# Patient Record
Sex: MALE | Race: WHITE | Employment: OTHER | URBAN - METROPOLITAN AREA
[De-identification: names, ages, dates, MRNs, and addresses within clinical notes are randomized per-mention and may not be internally consistent; named-entity substitution may affect disease eponyms.]

---

## 2019-03-21 VITALS
OXYGEN SATURATION: 97 % | RESPIRATION RATE: 18 BRPM | TEMPERATURE: 98.5 F | HEIGHT: 66 IN | SYSTOLIC BLOOD PRESSURE: 169 MMHG | DIASTOLIC BLOOD PRESSURE: 66 MMHG | WEIGHT: 199.52 LBS | HEART RATE: 54 BPM | BODY MASS INDEX: 32.06 KG/M2

## 2019-03-21 PROCEDURE — 93005 ELECTROCARDIOGRAM TRACING: CPT

## 2019-03-21 PROCEDURE — 99283 EMERGENCY DEPT VISIT LOW MDM: CPT

## 2019-03-22 ENCOUNTER — HOSPITAL ENCOUNTER (EMERGENCY)
Age: 79
Discharge: HOME | End: 2019-03-22
Attending: EMERGENCY MEDICINE
Payer: MEDICARE

## 2019-03-22 ENCOUNTER — APPOINTMENT (OUTPATIENT)
Dept: GENERAL RADIOLOGY | Age: 79
End: 2019-03-22
Attending: EMERGENCY MEDICINE
Payer: MEDICARE

## 2019-03-22 VITALS
WEIGHT: 195 LBS | TEMPERATURE: 98.4 F | HEIGHT: 66 IN | DIASTOLIC BLOOD PRESSURE: 77 MMHG | OXYGEN SATURATION: 93 % | RESPIRATION RATE: 16 BRPM | BODY MASS INDEX: 31.34 KG/M2 | HEART RATE: 85 BPM | SYSTOLIC BLOOD PRESSURE: 142 MMHG

## 2019-03-22 DIAGNOSIS — J20.9 ACUTE BRONCHITIS, UNSPECIFIED ORGANISM: Primary | ICD-10-CM

## 2019-03-22 DIAGNOSIS — J18.9 COMMUNITY ACQUIRED PNEUMONIA, UNSPECIFIED LATERALITY: Primary | ICD-10-CM

## 2019-03-22 LAB
ALBUMIN SERPL-MCNC: 3.6 G/DL (ref 3.5–5)
ALBUMIN SERPL-MCNC: 3.7 G/DL (ref 3.5–5)
ALBUMIN/GLOB SERPL: 0.9 {RATIO} (ref 1.1–2.2)
ALBUMIN/GLOB SERPL: 0.9 {RATIO} (ref 1.1–2.2)
ALP SERPL-CCNC: 128 U/L (ref 45–117)
ALP SERPL-CCNC: 129 U/L (ref 45–117)
ALT SERPL-CCNC: 20 U/L (ref 12–78)
ALT SERPL-CCNC: 21 U/L (ref 12–78)
ANION GAP SERPL CALC-SCNC: 5 MMOL/L (ref 5–15)
ANION GAP SERPL CALC-SCNC: 7 MMOL/L (ref 5–15)
AST SERPL-CCNC: 13 U/L (ref 15–37)
AST SERPL-CCNC: 15 U/L (ref 15–37)
ATRIAL RATE: 63 BPM
BASOPHILS # BLD: 0.1 K/UL (ref 0–0.1)
BASOPHILS # BLD: 0.1 K/UL (ref 0–0.1)
BASOPHILS NFR BLD: 1 % (ref 0–1)
BASOPHILS NFR BLD: 1 % (ref 0–1)
BILIRUB SERPL-MCNC: 0.6 MG/DL (ref 0.2–1)
BILIRUB SERPL-MCNC: 0.6 MG/DL (ref 0.2–1)
BNP SERPL-MCNC: 88 PG/ML
BUN SERPL-MCNC: 12 MG/DL (ref 6–20)
BUN SERPL-MCNC: 14 MG/DL (ref 6–20)
BUN/CREAT SERPL: 16 (ref 12–20)
BUN/CREAT SERPL: 19 (ref 12–20)
CALCIUM SERPL-MCNC: 8.5 MG/DL (ref 8.5–10.1)
CALCIUM SERPL-MCNC: 8.7 MG/DL (ref 8.5–10.1)
CALCULATED P AXIS, ECG09: 61 DEGREES
CALCULATED R AXIS, ECG10: 62 DEGREES
CALCULATED T AXIS, ECG11: 75 DEGREES
CHLORIDE SERPL-SCNC: 104 MMOL/L (ref 97–108)
CHLORIDE SERPL-SCNC: 106 MMOL/L (ref 97–108)
CK SERPL-CCNC: 57 U/L (ref 39–308)
CO2 SERPL-SCNC: 27 MMOL/L (ref 21–32)
CO2 SERPL-SCNC: 28 MMOL/L (ref 21–32)
COMMENT, HOLDF: NORMAL
CREAT SERPL-MCNC: 0.74 MG/DL (ref 0.7–1.3)
CREAT SERPL-MCNC: 0.74 MG/DL (ref 0.7–1.3)
DIAGNOSIS, 93000: NORMAL
DIFFERENTIAL METHOD BLD: ABNORMAL
DIFFERENTIAL METHOD BLD: ABNORMAL
EOSINOPHIL # BLD: 0.3 K/UL (ref 0–0.4)
EOSINOPHIL # BLD: 0.5 K/UL (ref 0–0.4)
EOSINOPHIL NFR BLD: 3 % (ref 0–7)
EOSINOPHIL NFR BLD: 4 % (ref 0–7)
ERYTHROCYTE [DISTWIDTH] IN BLOOD BY AUTOMATED COUNT: 13.4 % (ref 11.5–14.5)
ERYTHROCYTE [DISTWIDTH] IN BLOOD BY AUTOMATED COUNT: 13.5 % (ref 11.5–14.5)
GLOBULIN SER CALC-MCNC: 3.9 G/DL (ref 2–4)
GLOBULIN SER CALC-MCNC: 4 G/DL (ref 2–4)
GLUCOSE SERPL-MCNC: 103 MG/DL (ref 65–100)
GLUCOSE SERPL-MCNC: 99 MG/DL (ref 65–100)
HCT VFR BLD AUTO: 43.3 % (ref 36.6–50.3)
HCT VFR BLD AUTO: 43.8 % (ref 36.6–50.3)
HGB BLD-MCNC: 14 G/DL (ref 12.1–17)
HGB BLD-MCNC: 14.5 G/DL (ref 12.1–17)
IMM GRANULOCYTES # BLD AUTO: 0 K/UL (ref 0–0.04)
IMM GRANULOCYTES # BLD AUTO: 0 K/UL (ref 0–0.04)
IMM GRANULOCYTES NFR BLD AUTO: 0 % (ref 0–0.5)
IMM GRANULOCYTES NFR BLD AUTO: 0 % (ref 0–0.5)
LYMPHOCYTES # BLD: 1 K/UL (ref 0.8–3.5)
LYMPHOCYTES # BLD: 1.4 K/UL (ref 0.8–3.5)
LYMPHOCYTES NFR BLD: 10 % (ref 12–49)
LYMPHOCYTES NFR BLD: 12 % (ref 12–49)
MCH RBC QN AUTO: 28.5 PG (ref 26–34)
MCH RBC QN AUTO: 29.1 PG (ref 26–34)
MCHC RBC AUTO-ENTMCNC: 32 G/DL (ref 30–36.5)
MCHC RBC AUTO-ENTMCNC: 33.5 G/DL (ref 30–36.5)
MCV RBC AUTO: 86.9 FL (ref 80–99)
MCV RBC AUTO: 89.2 FL (ref 80–99)
MONOCYTES # BLD: 0.6 K/UL (ref 0–1)
MONOCYTES # BLD: 0.7 K/UL (ref 0–1)
MONOCYTES NFR BLD: 6 % (ref 5–13)
MONOCYTES NFR BLD: 7 % (ref 5–13)
NEUTS SEG # BLD: 7.9 K/UL (ref 1.8–8)
NEUTS SEG # BLD: 8.9 K/UL (ref 1.8–8)
NEUTS SEG NFR BLD: 77 % (ref 32–75)
NEUTS SEG NFR BLD: 79 % (ref 32–75)
NRBC # BLD: 0 K/UL (ref 0–0.01)
NRBC # BLD: 0 K/UL (ref 0–0.01)
NRBC BLD-RTO: 0 PER 100 WBC
NRBC BLD-RTO: 0 PER 100 WBC
P-R INTERVAL, ECG05: 164 MS
PLATELET # BLD AUTO: 177 K/UL (ref 150–400)
PLATELET # BLD AUTO: 188 K/UL (ref 150–400)
PMV BLD AUTO: 10.2 FL (ref 8.9–12.9)
PMV BLD AUTO: 9.9 FL (ref 8.9–12.9)
POTASSIUM SERPL-SCNC: 4.1 MMOL/L (ref 3.5–5.1)
POTASSIUM SERPL-SCNC: 4.3 MMOL/L (ref 3.5–5.1)
PROT SERPL-MCNC: 7.6 G/DL (ref 6.4–8.2)
PROT SERPL-MCNC: 7.6 G/DL (ref 6.4–8.2)
Q-T INTERVAL, ECG07: 386 MS
QRS DURATION, ECG06: 96 MS
QTC CALCULATION (BEZET), ECG08: 395 MS
RBC # BLD AUTO: 4.91 M/UL (ref 4.1–5.7)
RBC # BLD AUTO: 4.98 M/UL (ref 4.1–5.7)
SAMPLES BEING HELD,HOLD: NORMAL
SODIUM SERPL-SCNC: 138 MMOL/L (ref 136–145)
SODIUM SERPL-SCNC: 139 MMOL/L (ref 136–145)
TROPONIN I SERPL-MCNC: <0.05 NG/ML
VENTRICULAR RATE, ECG03: 63 BPM
WBC # BLD AUTO: 11.5 K/UL (ref 4.1–11.1)
WBC # BLD AUTO: 9.9 K/UL (ref 4.1–11.1)

## 2019-03-22 PROCEDURE — 80053 COMPREHEN METABOLIC PANEL: CPT

## 2019-03-22 PROCEDURE — 36415 COLL VENOUS BLD VENIPUNCTURE: CPT

## 2019-03-22 PROCEDURE — 82550 ASSAY OF CK (CPK): CPT

## 2019-03-22 PROCEDURE — 77030029684 HC NEB SM VOL KT MONA -A

## 2019-03-22 PROCEDURE — 74011000250 HC RX REV CODE- 250: Performed by: EMERGENCY MEDICINE

## 2019-03-22 PROCEDURE — 94640 AIRWAY INHALATION TREATMENT: CPT

## 2019-03-22 PROCEDURE — 71046 X-RAY EXAM CHEST 2 VIEWS: CPT

## 2019-03-22 PROCEDURE — 85025 COMPLETE CBC W/AUTO DIFF WBC: CPT

## 2019-03-22 PROCEDURE — 99284 EMERGENCY DEPT VISIT MOD MDM: CPT

## 2019-03-22 PROCEDURE — 84484 ASSAY OF TROPONIN QUANT: CPT

## 2019-03-22 PROCEDURE — 74011250637 HC RX REV CODE- 250/637: Performed by: EMERGENCY MEDICINE

## 2019-03-22 PROCEDURE — 96360 HYDRATION IV INFUSION INIT: CPT

## 2019-03-22 PROCEDURE — 83880 ASSAY OF NATRIURETIC PEPTIDE: CPT

## 2019-03-22 PROCEDURE — 74011250636 HC RX REV CODE- 250/636: Performed by: EMERGENCY MEDICINE

## 2019-03-22 RX ORDER — HYDROCODONE BITARTRATE AND HOMATROPINE METHYLBROMIDE ORAL SOLUTION 5; 1.5 MG/5ML; MG/5ML
5 LIQUID ORAL
Qty: 120 ML | Refills: 0 | Status: SHIPPED | OUTPATIENT
Start: 2019-03-22 | End: 2019-03-25

## 2019-03-22 RX ORDER — DOXYCYCLINE HYCLATE 100 MG
100 TABLET ORAL
Status: COMPLETED | OUTPATIENT
Start: 2019-03-22 | End: 2019-03-22

## 2019-03-22 RX ORDER — DOXYCYCLINE HYCLATE 100 MG
100 TABLET ORAL 2 TIMES DAILY
Qty: 14 TAB | Refills: 0 | Status: SHIPPED | OUTPATIENT
Start: 2019-03-22 | End: 2019-03-22

## 2019-03-22 RX ORDER — ACETAMINOPHEN 325 MG/1
650 TABLET ORAL
Status: COMPLETED | OUTPATIENT
Start: 2019-03-22 | End: 2019-03-22

## 2019-03-22 RX ORDER — DOXYCYCLINE HYCLATE 100 MG
100 TABLET ORAL 2 TIMES DAILY
Qty: 14 TAB | Refills: 0 | Status: SHIPPED | OUTPATIENT
Start: 2019-03-22 | End: 2019-03-29

## 2019-03-22 RX ORDER — ALBUTEROL SULFATE 2.5 MG/.5ML
5 SOLUTION RESPIRATORY (INHALATION)
Status: COMPLETED | OUTPATIENT
Start: 2019-03-22 | End: 2019-03-22

## 2019-03-22 RX ORDER — PREDNISONE 20 MG/1
40 TABLET ORAL DAILY
Qty: 10 TAB | Refills: 0 | Status: SHIPPED | OUTPATIENT
Start: 2019-03-22 | End: 2019-03-22

## 2019-03-22 RX ORDER — ALBUTEROL SULFATE 90 UG/1
1 AEROSOL, METERED RESPIRATORY (INHALATION)
Status: COMPLETED | OUTPATIENT
Start: 2019-03-22 | End: 2019-03-22

## 2019-03-22 RX ORDER — IPRATROPIUM BROMIDE AND ALBUTEROL SULFATE 2.5; .5 MG/3ML; MG/3ML
3 SOLUTION RESPIRATORY (INHALATION)
Status: COMPLETED | OUTPATIENT
Start: 2019-03-22 | End: 2019-03-22

## 2019-03-22 RX ORDER — PREDNISONE 20 MG/1
40 TABLET ORAL DAILY
Qty: 10 TAB | Refills: 0 | Status: SHIPPED | OUTPATIENT
Start: 2019-03-22 | End: 2019-03-27

## 2019-03-22 RX ORDER — HYDROCODONE POLISTIREX AND CHLORPHENIRAMINE POLISTIREX 10; 8 MG/5ML; MG/5ML
5 SUSPENSION, EXTENDED RELEASE ORAL
Status: COMPLETED | OUTPATIENT
Start: 2019-03-22 | End: 2019-03-22

## 2019-03-22 RX ADMIN — ACETAMINOPHEN 650 MG: 325 TABLET ORAL at 17:29

## 2019-03-22 RX ADMIN — ALBUTEROL SULFATE 5 MG: 2.5 SOLUTION RESPIRATORY (INHALATION) at 04:43

## 2019-03-22 RX ADMIN — ALBUTEROL SULFATE 5 MG: 2.5 SOLUTION RESPIRATORY (INHALATION) at 03:08

## 2019-03-22 RX ADMIN — SODIUM CHLORIDE 500 ML: 900 INJECTION, SOLUTION INTRAVENOUS at 03:08

## 2019-03-22 RX ADMIN — HYDROCODONE POLISTIREX AND CHLORPHENIRAMINE POLISTIREX 5 ML: 10; 8 SUSPENSION, EXTENDED RELEASE ORAL at 04:43

## 2019-03-22 RX ADMIN — ALBUTEROL SULFATE 1 PUFF: 90 AEROSOL, METERED RESPIRATORY (INHALATION) at 04:43

## 2019-03-22 RX ADMIN — DOXYCYCLINE HYCLATE 100 MG: 100 TABLET, COATED ORAL at 04:43

## 2019-03-22 RX ADMIN — IPRATROPIUM BROMIDE AND ALBUTEROL SULFATE 3 ML: .5; 3 SOLUTION RESPIRATORY (INHALATION) at 17:31

## 2019-03-22 NOTE — ED PROVIDER NOTES
EMERGENCY DEPARTMENT HISTORY AND PHYSICAL EXAM 
 
 
Date: 3/22/2019 Patient Name: Alexandra Dickey History of Presenting Illness Chief Complaint Patient presents with  Shortness of Breath Worse with laying down. Cough x a few days. Pt reports he has had a cold over the past few days. History Provided By: Patient HPI: Alexandra Dickey, 69 y.o. male with PMHx significant for hypertension, presents POV to the ED with cc of shortness of breath. Patient states he had been on a [place] cruise a couple months ago and had developed an upper respiratory illness at the end of his travel. Patient states he used over-the-counter medications for cough and cold and flu symptoms and had improvement of all of his symptoms. Over the last 2-3 days patient notes increased in shortness of breath especially with exertion and a nonproductive cough, and unable to sleep the last night due to feeling short of breath and cough. Patient denies any fever, there has been nasal congestion, he denies any sore throat. Patient denies any chest pain or chest discomfort. Patient states he feels short of breath just feels like he cannot get a deep breath in and when laying flat he feels that he cannot read either. Patient has no prior history of asthma or COPD. Patient denies any abdominal pain. Patient denies any nausea vomiting diarrhea. Patient denies any leg swelling or calf tenderness. Patient denies any history of previous blood clots. Almost age and almost wherever There are no other complaints, changes, or physical findings at this time. PCP: None No current facility-administered medications on file prior to encounter. Current Outpatient Medications on File Prior to Encounter Medication Sig Dispense Refill  multivitamin (ONE A DAY) tablet Take 1 Tab by mouth daily.  omega-3 fatty acids-vitamin e (FISH OIL) 1,000 mg Cap Take 1 Cap by mouth daily.  naproxen sodium (ALEVE) 220 mg tablet Take 220 mg by mouth daily.  aspirin 81 mg tablet Take 81 mg by mouth daily.  HYDROcodone-acetaminophen (NORCO) 5-325 mg per tablet Take 1-2 Tabs by mouth every four (4) hours as needed for Pain for 20 doses. 20 Tab 0  
 ondansetron (ZOFRAN ODT) 4 mg disintegrating tablet Take 1 Tab by mouth every eight (8) hours as needed for Nausea. 10 Tab 0  
 diphenoxylate-atropine (LOMOTIL) 2.5-0.025 mg per tablet Take 1 Tab by mouth four (4) times daily as needed for Diarrhea. 20 Tab 0 Past History Past Medical History: 
Past Medical History:  
Diagnosis Date  Hypertension Past Surgical History: 
Past Surgical History:  
Procedure Laterality Date  CARDIAC SURG PROCEDURE UNLIST    
 cardiac stents Family History: No family history on file. Social History: 
Social History Tobacco Use  Smoking status: Never Smoker Substance Use Topics  Alcohol use: No  
 Drug use: Not on file Allergies: 
No Known Allergies Review of Systems Review of Systems Constitutional: Positive for appetite change and fatigue. Negative for chills and fever. HENT: Positive for congestion and postnasal drip. Negative for rhinorrhea, sinus pressure, sore throat and trouble swallowing. Eyes: Negative. Respiratory: Positive for cough, shortness of breath and wheezing. Negative for choking and chest tightness. Cardiovascular: Negative. Negative for chest pain, palpitations and leg swelling. Gastrointestinal: Negative for abdominal pain, constipation, diarrhea, nausea and vomiting. Endocrine: Negative. Genitourinary: Negative. Negative for difficulty urinating, dysuria, flank pain and urgency. Musculoskeletal: Negative. Negative for arthralgias, myalgias, neck pain and neck stiffness. Skin: Negative. Neurological: Negative. Negative for dizziness, speech difficulty, weakness, light-headedness, numbness and headaches. Psychiatric/Behavioral: Negative. All other systems reviewed and are negative. Physical Exam  
Physical Exam  
Constitutional: He is oriented to person, place, and time. He appears well-developed and well-nourished. No distress. HENT:  
Head: Normocephalic and atraumatic. Mouth/Throat: Oropharynx is clear and moist. No oropharyngeal exudate. Eyes: Pupils are equal, round, and reactive to light. Conjunctivae and EOM are normal.  
Neck: Normal range of motion. Neck supple. No JVD present. No tracheal deviation present. Cardiovascular: Normal rate, regular rhythm, normal heart sounds and intact distal pulses. No murmur heard. Pulmonary/Chest: No stridor. No respiratory distress. He has no rales. He exhibits no tenderness. Increase work of breathing, diminished throughout all lung fields Abdominal: Soft. He exhibits no distension. There is no tenderness. There is no rebound and no guarding. Obese Musculoskeletal: Normal range of motion. He exhibits no edema or tenderness. Neurological: He is alert and oriented to person, place, and time. No cranial nerve deficit. No gross motor or sensory deficits Skin: Skin is warm and dry. He is not diaphoretic. Psychiatric: He has a normal mood and affect. His behavior is normal.  
Nursing note and vitals reviewed. Diagnostic Study Results Labs - Recent Results (from the past 12 hour(s)) SAMPLES BEING HELD Collection Time: 03/22/19  4:21 PM  
Result Value Ref Range SAMPLES BEING HELD 1RED 1BLU 2BC(1LAV 1NVY) COMMENT Add-on orders for these samples will be processed based on acceptable specimen integrity and analyte stability, which may vary by analyte. CBC WITH AUTOMATED DIFF Collection Time: 03/22/19  4:21 PM  
Result Value Ref Range WBC 9.9 4.1 - 11.1 K/uL  
 RBC 4.91 4.10 - 5.70 M/uL  
 HGB 14.0 12.1 - 17.0 g/dL HCT 43.8 36.6 - 50.3 %  MCV 89.2 80.0 - 99.0 FL  
 MCH 28.5 26.0 - 34.0 PG  
 MCHC 32.0 30.0 - 36.5 g/dL  
 RDW 13.5 11.5 - 14.5 % PLATELET 377 258 - 087 K/uL MPV 9.9 8.9 - 12.9 FL  
 NRBC 0.0 0  WBC ABSOLUTE NRBC 0.00 0.00 - 0.01 K/uL NEUTROPHILS 79 (H) 32 - 75 % LYMPHOCYTES 10 (L) 12 - 49 % MONOCYTES 7 5 - 13 % EOSINOPHILS 3 0 - 7 % BASOPHILS 1 0 - 1 % IMMATURE GRANULOCYTES 0 0.0 - 0.5 % ABS. NEUTROPHILS 7.9 1.8 - 8.0 K/UL  
 ABS. LYMPHOCYTES 1.0 0.8 - 3.5 K/UL  
 ABS. MONOCYTES 0.7 0.0 - 1.0 K/UL  
 ABS. EOSINOPHILS 0.3 0.0 - 0.4 K/UL  
 ABS. BASOPHILS 0.1 0.0 - 0.1 K/UL  
 ABS. IMM. GRANS. 0.0 0.00 - 0.04 K/UL  
 DF AUTOMATED METABOLIC PANEL, COMPREHENSIVE Collection Time: 03/22/19  4:21 PM  
Result Value Ref Range Sodium 138 136 - 145 mmol/L Potassium 4.1 3.5 - 5.1 mmol/L Chloride 106 97 - 108 mmol/L  
 CO2 27 21 - 32 mmol/L Anion gap 5 5 - 15 mmol/L Glucose 99 65 - 100 mg/dL BUN 14 6 - 20 MG/DL Creatinine 0.74 0.70 - 1.30 MG/DL  
 BUN/Creatinine ratio 19 12 - 20 GFR est AA >60 >60 ml/min/1.73m2 GFR est non-AA >60 >60 ml/min/1.73m2 Calcium 8.5 8.5 - 10.1 MG/DL Bilirubin, total 0.6 0.2 - 1.0 MG/DL  
 ALT (SGPT) 20 12 - 78 U/L  
 AST (SGOT) 15 15 - 37 U/L Alk. phosphatase 128 (H) 45 - 117 U/L Protein, total 7.6 6.4 - 8.2 g/dL Albumin 3.6 3.5 - 5.0 g/dL Globulin 4.0 2.0 - 4.0 g/dL A-G Ratio 0.9 (L) 1.1 - 2.2 Radiologic Studies -  
XR CHEST PA LAT Final Result IMPRESSION: Right lower lobe linear atelectasis. Otherwise no acute  
cardiopulmonary process. CT Results  (Last 48 hours) None CXR Results  (Last 48 hours) 03/22/19 0304  XR CHEST PA LAT Final result Impression:  IMPRESSION: Right lower lobe linear atelectasis. Otherwise no acute  
cardiopulmonary process. Narrative:  EXAM:  XR CHEST PA LAT INDICATION:   SOA  
   
COMPARISON: Chest radiograph 7/19/2012. FINDINGS: PA and lateral radiographs of the chest were obtained. There is linear atelectasis in the right lower lobe. Unchanged elevation of the  
right hemidiaphragm. No pleural effusion or pneumothorax. Heart, wendy,  
mediastinum are within normal limits. No acute osseous abnormalities. Medical Decision Making I am the first provider for this patient. I reviewed the vital signs, available nursing notes, past medical history, past surgical history, family history and social history. Vital Signs-Reviewed the patient's vital signs. No data found. Pulse Oximetry Analysis - 94% on RA Cardiac Monitor:  
Rate: 90 bpm 
Rhythm: Normal Sinus Rhythm EKG interpretation: (Preliminary) Sinus rhythm with PACs, rate 63, normal axis, normal MD, normal QRS, no acute ST changes. Records Reviewed: Nursing Notes and Old Medical Records Provider Notes (Medical Decision Making): DDx- Bronchitis, pneumonia, acute heart failure, ACS 
 
ED Course:  
Initial assessment performed. The patients presenting problems have been discussed, and they are in agreement with the care plan formulated and outlined with them. I have encouraged them to ask questions as they arise throughout their visit. During the patient's ED stay he was given 3 separate breathing treatments of albuterol patient noticeably felt that he can breathe better at the time of discharge was not short of breath with exertion or laying flat. Patient cardiac enzymes were normal.  ProBNP within normal limits. Patient be treated with inhalers at home in addition to prednisone and patient has not had a previous use of an inhaler in the past antibiotics. Prescription for a spacer was sent to his pharmacy to be used with his metered-dose inhaler. Discussed with patient as well as his recent trip this was approximately 2 months ago and had no other significant exposures including potential aspiration of motion water. Critical Care Time:  
None Disposition: 
Discharge home PLAN: 
1. Discharge Medication List as of 3/22/2019  5:07 AM  
  
START taking these medications Details  
predniSONE (DELTASONE) 20 mg tablet Take 40 mg by mouth daily for 5 days. With Breakfast, Normal, Disp-10 Tab, R-0  
  
HYDROcodone-homatropine (HYCODAN) 5-1.5 mg/5 mL syrup Take 5 mL by mouth four (4) times daily as needed (120) for up to 3 days. Max Daily Amount: 20 mL., Print, Disp-120 mL, R-0  
  
doxycycline (VIBRA-TABS) 100 mg tablet Take 1 Tab by mouth two (2) times a day for 7 days. , Normal, Disp-14 Tab, R-0  
  
inhalational spacing device 1 Each by Does Not Apply route as needed for Other (SOA). , Normal, Disp-1 Each, R-0  
  
  
CONTINUE these medications which have NOT CHANGED Details  
multivitamin (ONE A DAY) tablet Take 1 Tab by mouth daily. Historical Med, 1 Tab  
  
omega-3 fatty acids-vitamin e (FISH OIL) 1,000 mg Cap Take 1 Cap by mouth daily. Historical Med, 1 Cap  
  
naproxen sodium (ALEVE) 220 mg tablet Take 220 mg by mouth daily. Historical Med, 220 mg  
  
aspirin 81 mg tablet Take 81 mg by mouth daily. Historical Med, 81 mg HYDROcodone-acetaminophen (NORCO) 5-325 mg per tablet Take 1-2 Tabs by mouth every four (4) hours as needed for Pain for 20 doses. Print, 1-2 Tab, Disp-20 Tab, R-0  
  
ondansetron (ZOFRAN ODT) 4 mg disintegrating tablet Take 1 Tab by mouth every eight (8) hours as needed for Nausea. Print, 4 mg, Disp-10 Tab, R-0  
  
diphenoxylate-atropine (LOMOTIL) 2.5-0.025 mg per tablet Take 1 Tab by mouth four (4) times daily as needed for Diarrhea. Print, 1 Tab, Disp-20 Tab, R-0  
  
  
 
2. Follow-up Information Follow up With Specialties Details Why Contact Info None    None ([phone] Patient stated that they have no PCP [facility] EM[facility] Emergency Medicine  If symptoms worsen [address] [place] [address] [address]  [facility] 
[phone] Return to ED if worse Diagnosis Clinical Impression: 1. Community acquired pneumonia, unspecified laterality Attestations: 
 
None

## 2019-03-22 NOTE — DISCHARGE INSTRUCTIONS
Patient Education        Pneumonia: Care Instructions  Your Care Instructions    Pneumonia is an infection of the lungs. Most cases are caused by infections from bacteria or viruses. Pneumonia may be mild or very severe. If it is caused by bacteria, you will be treated with antibiotics. It may take a few weeks to a few months to recover fully from pneumonia, depending on how sick you were and whether your overall health is good. Follow-up care is a key part of your treatment and safety. Be sure to make and go to all appointments, and call your doctor if you are having problems. It's also a good idea to know your test results and keep a list of the medicines you take. How can you care for yourself at home? · Take your antibiotics exactly as directed. Do not stop taking the medicine just because you are feeling better. You need to take the full course of antibiotics. · Take your medicines exactly as prescribed. Call your doctor if you think you are having a problem with your medicine. · Get plenty of rest and sleep. You may feel weak and tired for a while, but your energy level will improve with time. · To prevent dehydration, drink plenty of fluids, enough so that your urine is light yellow or clear like water. Choose water and other caffeine-free clear liquids until you feel better. If you have kidney, heart, or liver disease and have to limit fluids, talk with your doctor before you increase the amount of fluids you drink. · Take care of your cough so you can rest. A cough that brings up mucus from your lungs is common with pneumonia. It is one way your body gets rid of the infection. But if coughing keeps you from resting or causes severe fatigue and chest-wall pain, talk to your doctor. He or she may suggest that you take a medicine to reduce the cough. · Use a vaporizer or humidifier to add moisture to your bedroom. Follow the directions for cleaning the machine.   · Do not smoke or allow others to smoke around you. Smoke will make your cough last longer. If you need help quitting, talk to your doctor about stop-smoking programs and medicines. These can increase your chances of quitting for good. · Take an over-the-counter pain medicine, such as acetaminophen (Tylenol), ibuprofen (Advil, Motrin), or naproxen (Aleve). Read and follow all instructions on the label. · Do not take two or more pain medicines at the same time unless the doctor told you to. Many pain medicines have acetaminophen, which is Tylenol. Too much acetaminophen (Tylenol) can be harmful. · If you were given a spirometer to measure how well your lungs are working, use it as instructed. This can help your doctor tell how your recovery is going. · To prevent pneumonia in the future, talk to your doctor about getting a flu vaccine (once a year) and a pneumococcal vaccine (one time only for most people). When should you call for help? Call 911 anytime you think you may need emergency care. For example, call if:    · You have severe trouble breathing.    Call your doctor now or seek immediate medical care if:    · You cough up dark brown or bloody mucus (sputum).     · You have new or worse trouble breathing.     · You are dizzy or lightheaded, or you feel like you may faint.    Watch closely for changes in your health, and be sure to contact your doctor if:    · You have a new or higher fever.     · You are coughing more deeply or more often.     · You are not getting better after 2 days (48 hours).     · You do not get better as expected. Where can you learn more? Go to http://miguel-rodney.info/. Enter [id number] in the search box to learn more about \"Pneumonia: Care Instructions. \"  Current as of: September 5, 2018  Content Version: 11.9  © 9085-7125 [employer]. Care instructions adapted under license by [employer] (which disclaims liability or warranty for this information).  If you have questions about a medical condition or this instruction, always ask your healthcare professional. [address]  any warranty or liability for your use of this information.        ALBUTEROL EVERY 4 HOURS FOR 2 DAYS AND THEN EVERY AS NEEDED

## 2019-03-22 NOTE — ED NOTES
Patient ambulatory to ER room with Triage RN, steady gait observed. Patient accompanied by family. 5:24 PM 
RT paged for neb tx. 
 
5:32 PM 
RT at bedside.

## 2019-03-22 NOTE — ED PROVIDER NOTES
69 y.o. male with past medical history significant for HTN who presents from home with chief complaint of fever. [facility] d/t SOB, cough, and fever. He had a CXR w/ blood work, was given a breathing treatment, was Dx w/ PNA, and was given a first dose of abx w/ prednisone ~0500 this morning. Pt was discharged w/ prescriptions for an inhaler, prednisone, and doxycycline w/ recommendations to return if his fever increased. Pt states he has not been able to fill his prescription for abx since he was discharged. Pt's son reports Pt's fever increased to 102.5 F 1-2 hours ago. Pt also currently c/o upper abdominal pain which he attributes to frequent coughing. NKDA. Pt denies hx of HTN, DM, COPD, or asthma. Pt denies recent ill contacts. There are no other acute medical concerns at this time. Note written by Johny Bueno, as dictated by Drucie Dandy, MD 5:23 PM 
 
 
  
 
Past Medical History:  
Diagnosis Date  Hypertension Past Surgical History:  
Procedure Laterality Date  CARDIAC SURG PROCEDURE UNLIST    
 cardiac stents History reviewed. No pertinent family history. Social History Socioeconomic History  Marital status:  Spouse name: Not on file  Number of children: Not on file  Years of education: Not on file  Highest education level: Not on file Occupational History  Not on file Social Needs  Financial resource strain: Not on file  Food insecurity:  
  Worry: Not on file Inability: Not on file  Transportation needs:  
  Medical: Not on file Non-medical: Not on file Tobacco Use  Smoking status: Never Smoker Substance and Sexual Activity  Alcohol use: No  
 Drug use: Not on file  Sexual activity: Not on file Lifestyle  Physical activity:  
  Days per week: Not on file Minutes per session: Not on file  Stress: Not on file Relationships  Social connections:  
  Talks on phone: Not on file Gets together: Not on file Attends Zoroastrian service: Not on file Active member of club or organization: Not on file Attends meetings of clubs or organizations: Not on file Relationship status: Not on file  Intimate partner violence:  
  Fear of current or ex partner: Not on file Emotionally abused: Not on file Physically abused: Not on file Forced sexual activity: Not on file Other Topics Concern  Not on file Social History Narrative  Not on file ALLERGIES: Patient has no known allergies. Review of Systems Constitutional: Positive for fever. Negative for chills. Respiratory: Positive for cough and shortness of breath. Cardiovascular: Negative for chest pain. Gastrointestinal: Positive for abdominal pain. Negative for constipation, diarrhea and vomiting. Neurological: Negative for dizziness and light-headedness. All other systems reviewed and are negative. Vitals:  
 03/22/19 1426 03/22/19 1556 03/22/19 1721 03/22/19 1731 BP:  155/79 149/71 Pulse: 75 75 Resp:  16 18 Temp:  (!) 102.5 °F (39.2 °C) 100.3 °F (37.9 °C) SpO2: 95% 93% 91% 93% Weight:  88.5 kg (195 lb) Height:  5' 6\" (1.676 m) Physical Exam  
Constitutional: He appears well-developed and well-nourished. No distress. HENT:  
Head: Normocephalic and atraumatic. Eyes: Pupils are equal, round, and reactive to light. Conjunctivae are normal.  
Neck: Normal range of motion. Cardiovascular: Normal rate and regular rhythm. Pulmonary/Chest: Effort normal. He has wheezes. Diffuse expiratory wheezes. Abdominal: Soft. He exhibits no distension. There is no tenderness. Musculoskeletal: Normal range of motion. Neurological: He is alert. Skin: Skin is dry. Capillary refill takes less than 2 seconds. Nursing note and vitals reviewed. Note written by Johny Roldan, as dictated by Nedra Mcelroy MD 5:23 PM 
 
MDM Number of Diagnoses or Management Options Acute bronchitis, unspecified organism:  
Diagnosis management comments: DDX negative for PNA, PTX, pericarditis, and patient improved after nebulizers. Has Rx for albuterol, prednisone, doxycycline, and spacer - given paper Rx as his pharmacy was closed. Procedures PROGRESS NOTE: 
5:25 PM 
Nedra Mcelroy MD reviewed CXR from [facility] this morning which is clear. The patient's results have been reviewed with them and/or available family. Patient and/or family verbally conveyed their understanding and agreement of the patient's signs, symptoms, diagnosis, treatment and prognosis and additionally agree to follow up as recommended in the discharge instructions or to return to the Emergency Room should their condition change prior to their follow-up appointment. The patient/family verbally agrees with the care-plan and verbally conveys that all of their questions have been answered. The discharge instructions have also been provided to the patient and/or family with some educational information regarding the patient's diagnosis as well a list of reasons why the patient would want to return to the ER prior to their follow-up appointment, should their condition change.

## 2019-03-22 NOTE — ED NOTES
Quick look: [id number] and sent home. Arrives back with same complaints as yesterday.

## 2019-03-22 NOTE — DISCHARGE INSTRUCTIONS
Patient Education        Bronchitis: Care Instructions  Your Care Instructions    Bronchitis is inflammation of the bronchial tubes, which carry air to the lungs. The tubes swell and produce mucus, or phlegm. The mucus and inflamed bronchial tubes make you cough. You may have trouble breathing. Most cases of bronchitis are caused by viruses like those that cause colds. Antibiotics usually do not help and they may be harmful. Bronchitis usually develops rapidly and lasts about 2 to 3 weeks in otherwise healthy people. Follow-up care is a key part of your treatment and safety. Be sure to make and go to all appointments, and call your doctor if you are having problems. It's also a good idea to know your test results and keep a list of the medicines you take. How can you care for yourself at home? · Take all medicines exactly as prescribed. Call your doctor if you think you are having a problem with your medicine. · Get some extra rest.  · Take an over-the-counter pain medicine, such as acetaminophen (Tylenol), ibuprofen (Advil, Motrin), or naproxen (Aleve) to reduce fever and relieve body aches. Read and follow all instructions on the label. · Do not take two or more pain medicines at the same time unless the doctor told you to. Many pain medicines have acetaminophen, which is Tylenol. Too much acetaminophen (Tylenol) can be harmful. · Take an over-the-counter cough medicine that contains dextromethorphan to help quiet a dry, hacking cough so that you can sleep. Avoid cough medicines that have more than one active ingredient. Read and follow all instructions on the label. · Breathe moist air from a humidifier, hot shower, or sink filled with hot water. The heat and moisture will thin mucus so you can cough it out. · Do not smoke. Smoking can make bronchitis worse. If you need help quitting, talk to your doctor about stop-smoking programs and medicines.  These can increase your chances of quitting for good.  When should you call for help? Call 911 anytime you think you may need emergency care. For example, call if:    · You have severe trouble breathing.    Call your doctor now or seek immediate medical care if:    · You have new or worse trouble breathing.     · You cough up dark brown or bloody mucus (sputum).     · You have a new or higher fever.     · You have a new rash.    Watch closely for changes in your health, and be sure to contact your doctor if:    · You cough more deeply or more often, especially if you notice more mucus or a change in the color of your mucus.     · You are not getting better as expected. Where can you learn more? Go to http://miguel-rodney.info/. Enter H333 in the search box to learn more about \"Bronchitis: Care Instructions. \"  Current as of: September 5, 2018  Content Version: 11.9  © 6051-1319 [employer]. Care instructions adapted under license by [employer] (which disclaims liability or warranty for this information). If you have questions about a medical condition or this instruction, always ask your healthcare professional. [address] any warranty or liability for your use of this information.

## 2019-03-23 NOTE — ED NOTES
Patient discharged by Kalani Kahn MD. 
 
Pt given discharge instructions, patient education, 2 prescriptions, and follow up information. Pt verbalizes understanding. All questions answered. Pt discharged to home in private vehicle, ambulatory. Pt A/Ox4, RA, pain controlled.

## 2019-07-17 ENCOUNTER — ANESTHESIA EVENT (OUTPATIENT)
Dept: MEDSURG UNIT | Age: 79
End: 2019-07-17
Payer: MEDICARE

## 2019-07-18 ENCOUNTER — HOSPITAL ENCOUNTER (OUTPATIENT)
Age: 79
Setting detail: OUTPATIENT SURGERY
Discharge: HOME | End: 2019-07-18
Attending: PODIATRIST | Admitting: PODIATRIST
Payer: MEDICARE

## 2019-07-18 ENCOUNTER — ANESTHESIA (OUTPATIENT)
Dept: MEDSURG UNIT | Age: 79
End: 2019-07-18
Payer: MEDICARE

## 2019-07-18 VITALS
WEIGHT: 196 LBS | BODY MASS INDEX: 31.5 KG/M2 | TEMPERATURE: 97.5 F | RESPIRATION RATE: 16 BRPM | HEIGHT: 66 IN | OXYGEN SATURATION: 96 % | DIASTOLIC BLOOD PRESSURE: 64 MMHG | SYSTOLIC BLOOD PRESSURE: 139 MMHG | HEART RATE: 45 BPM

## 2019-07-18 PROBLEM — M77.41 METATARSALGIA, RIGHT FOOT: Status: ACTIVE | Noted: 2019-07-18

## 2019-07-18 PROBLEM — M77.41 METATARSALGIA, RIGHT FOOT: Status: RESOLVED | Noted: 2019-07-18 | Resolved: 2019-07-18

## 2019-07-18 PROBLEM — M20.40 HAMMER TOE: Status: RESOLVED | Noted: 2019-07-18 | Resolved: 2019-07-18

## 2019-07-18 PROBLEM — M20.40 HAMMER TOE: Status: ACTIVE | Noted: 2019-07-18

## 2019-07-18 PROCEDURE — 76060000063 HC AMB SURG ANES 1.5 TO 2 HR: Performed by: PODIATRIST

## 2019-07-18 PROCEDURE — 77030010396 HC WRE FIX C CNMD -A: Performed by: PODIATRIST

## 2019-07-18 PROCEDURE — 77030031139 HC SUT VCRL2 J&J -A: Performed by: PODIATRIST

## 2019-07-18 PROCEDURE — 77030011640 HC PAD GRND REM COVD -A: Performed by: PODIATRIST

## 2019-07-18 PROCEDURE — 74011000250 HC RX REV CODE- 250

## 2019-07-18 PROCEDURE — 76210000050 HC AMBSU PH II REC 0.5 TO 1 HR: Performed by: PODIATRIST

## 2019-07-18 PROCEDURE — 77030020754 HC CUF TRNQT 2BLA STRY -B: Performed by: PODIATRIST

## 2019-07-18 PROCEDURE — 74011250636 HC RX REV CODE- 250/636: Performed by: PODIATRIST

## 2019-07-18 PROCEDURE — 74011000250 HC RX REV CODE- 250: Performed by: PODIATRIST

## 2019-07-18 PROCEDURE — 77030020782 HC GWN BAIR PAWS FLX 3M -B

## 2019-07-18 PROCEDURE — 77030004410 HC BUR OVL MCRA -B: Performed by: PODIATRIST

## 2019-07-18 PROCEDURE — C1713 ANCHOR/SCREW BN/BN,TIS/BN: HCPCS | Performed by: PODIATRIST

## 2019-07-18 PROCEDURE — 77030018836 HC SOL IRR NACL ICUM -A: Performed by: PODIATRIST

## 2019-07-18 PROCEDURE — 76030000003 HC AMB SURG OR TIME 1.5 TO 2: Performed by: PODIATRIST

## 2019-07-18 PROCEDURE — 74011250636 HC RX REV CODE- 250/636

## 2019-07-18 PROCEDURE — 77030020268 HC MISC GENERAL SUPPLY: Performed by: PODIATRIST

## 2019-07-18 PROCEDURE — 77030032490 HC SLV COMPR SCD KNE COVD -B: Performed by: PODIATRIST

## 2019-07-18 PROCEDURE — 77030031749 HC WRE K DISP TRIL -B: Performed by: PODIATRIST

## 2019-07-18 PROCEDURE — 74011250636 HC RX REV CODE- 250/636: Performed by: ANESTHESIOLOGY

## 2019-07-18 PROCEDURE — 77030020269 HC MISC IMPL: Performed by: PODIATRIST

## 2019-07-18 PROCEDURE — 76210000036 HC AMBSU PH I REC 1.5 TO 2 HR: Performed by: PODIATRIST

## 2019-07-18 PROCEDURE — 77030006784 HC BLD SAW OSC MCRA -B: Performed by: PODIATRIST

## 2019-07-18 PROCEDURE — 77030002916 HC SUT ETHLN J&J -A: Performed by: PODIATRIST

## 2019-07-18 DEVICE — IMPLANTABLE DEVICE: Type: IMPLANTABLE DEVICE | Site: FOOT | Status: FUNCTIONAL

## 2019-07-18 RX ORDER — FENTANYL CITRATE 50 UG/ML
25 INJECTION, SOLUTION INTRAMUSCULAR; INTRAVENOUS
Status: DISCONTINUED | OUTPATIENT
Start: 2019-07-18 | End: 2019-07-18 | Stop reason: HOSPADM

## 2019-07-18 RX ORDER — LIDOCAINE HYDROCHLORIDE 10 MG/ML
0.1 INJECTION, SOLUTION EPIDURAL; INFILTRATION; INTRACAUDAL; PERINEURAL AS NEEDED
Status: DISCONTINUED | OUTPATIENT
Start: 2019-07-18 | End: 2019-07-18 | Stop reason: HOSPADM

## 2019-07-18 RX ORDER — SODIUM CHLORIDE, SODIUM LACTATE, POTASSIUM CHLORIDE, CALCIUM CHLORIDE 600; 310; 30; 20 MG/100ML; MG/100ML; MG/100ML; MG/100ML
50 INJECTION, SOLUTION INTRAVENOUS CONTINUOUS
Status: DISCONTINUED | OUTPATIENT
Start: 2019-07-18 | End: 2019-07-18 | Stop reason: HOSPADM

## 2019-07-18 RX ORDER — PROPOFOL 10 MG/ML
INJECTION, EMULSION INTRAVENOUS
Status: DISCONTINUED | OUTPATIENT
Start: 2019-07-18 | End: 2019-07-18 | Stop reason: HOSPADM

## 2019-07-18 RX ORDER — ALBUTEROL SULFATE 0.83 MG/ML
SOLUTION RESPIRATORY (INHALATION)
COMMUNITY
End: 2022-01-28

## 2019-07-18 RX ORDER — ONDANSETRON 2 MG/ML
4 INJECTION INTRAMUSCULAR; INTRAVENOUS AS NEEDED
Status: DISCONTINUED | OUTPATIENT
Start: 2019-07-18 | End: 2019-07-18 | Stop reason: HOSPADM

## 2019-07-18 RX ORDER — SODIUM CHLORIDE 0.9 % (FLUSH) 0.9 %
5-40 SYRINGE (ML) INJECTION AS NEEDED
Status: DISCONTINUED | OUTPATIENT
Start: 2019-07-18 | End: 2019-07-18 | Stop reason: HOSPADM

## 2019-07-18 RX ORDER — DEXMEDETOMIDINE HYDROCHLORIDE 4 UG/ML
INJECTION, SOLUTION INTRAVENOUS
Status: DISCONTINUED | OUTPATIENT
Start: 2019-07-18 | End: 2019-07-18 | Stop reason: HOSPADM

## 2019-07-18 RX ORDER — CEFAZOLIN SODIUM/WATER 2 G/20 ML
2 SYRINGE (ML) INTRAVENOUS ONCE
Status: COMPLETED | OUTPATIENT
Start: 2019-07-18 | End: 2019-07-18

## 2019-07-18 RX ORDER — EPHEDRINE SULFATE/0.9% NACL/PF 50 MG/5 ML
SYRINGE (ML) INTRAVENOUS AS NEEDED
Status: DISCONTINUED | OUTPATIENT
Start: 2019-07-18 | End: 2019-07-18 | Stop reason: HOSPADM

## 2019-07-18 RX ORDER — SODIUM CHLORIDE 0.9 % (FLUSH) 0.9 %
5-40 SYRINGE (ML) INJECTION EVERY 8 HOURS
Status: DISCONTINUED | OUTPATIENT
Start: 2019-07-18 | End: 2019-07-18 | Stop reason: HOSPADM

## 2019-07-18 RX ORDER — MIDAZOLAM HYDROCHLORIDE 1 MG/ML
INJECTION, SOLUTION INTRAMUSCULAR; INTRAVENOUS AS NEEDED
Status: DISCONTINUED | OUTPATIENT
Start: 2019-07-18 | End: 2019-07-18 | Stop reason: HOSPADM

## 2019-07-18 RX ORDER — PROPOFOL 10 MG/ML
INJECTION, EMULSION INTRAVENOUS AS NEEDED
Status: DISCONTINUED | OUTPATIENT
Start: 2019-07-18 | End: 2019-07-18 | Stop reason: HOSPADM

## 2019-07-18 RX ORDER — MORPHINE SULFATE 10 MG/ML
2 INJECTION, SOLUTION INTRAMUSCULAR; INTRAVENOUS
Status: DISCONTINUED | OUTPATIENT
Start: 2019-07-18 | End: 2019-07-18 | Stop reason: HOSPADM

## 2019-07-18 RX ORDER — HYDROMORPHONE HYDROCHLORIDE 1 MG/ML
0.2 INJECTION, SOLUTION INTRAMUSCULAR; INTRAVENOUS; SUBCUTANEOUS
Status: DISCONTINUED | OUTPATIENT
Start: 2019-07-18 | End: 2019-07-18 | Stop reason: HOSPADM

## 2019-07-18 RX ADMIN — PROPOFOL 50 MG: 10 INJECTION, EMULSION INTRAVENOUS at 09:15

## 2019-07-18 RX ADMIN — PROPOFOL 50 MCG/KG/MIN: 10 INJECTION, EMULSION INTRAVENOUS at 09:09

## 2019-07-18 RX ADMIN — PROPOFOL 50 MG: 10 INJECTION, EMULSION INTRAVENOUS at 09:22

## 2019-07-18 RX ADMIN — PROPOFOL 50 MG: 10 INJECTION, EMULSION INTRAVENOUS at 09:13

## 2019-07-18 RX ADMIN — MIDAZOLAM HYDROCHLORIDE 3 MG: 1 INJECTION, SOLUTION INTRAMUSCULAR; INTRAVENOUS at 09:01

## 2019-07-18 RX ADMIN — SODIUM CHLORIDE, SODIUM LACTATE, POTASSIUM CHLORIDE, AND CALCIUM CHLORIDE 50 ML/HR: 600; 310; 30; 20 INJECTION, SOLUTION INTRAVENOUS at 08:51

## 2019-07-18 RX ADMIN — MIDAZOLAM HYDROCHLORIDE 2 MG: 1 INJECTION, SOLUTION INTRAMUSCULAR; INTRAVENOUS at 09:06

## 2019-07-18 RX ADMIN — PROPOFOL: 10 INJECTION, EMULSION INTRAVENOUS at 09:25

## 2019-07-18 RX ADMIN — DEXMEDETOMIDINE HYDROCHLORIDE 0.7 MCG/KG/HR: 4 INJECTION, SOLUTION INTRAVENOUS at 09:28

## 2019-07-18 RX ADMIN — PROPOFOL 50 MG: 10 INJECTION, EMULSION INTRAVENOUS at 09:10

## 2019-07-18 RX ADMIN — Medication 2 G: at 09:15

## 2019-07-18 RX ADMIN — Medication 10 MG: at 09:56

## 2019-07-18 RX ADMIN — Medication 10 MG: at 10:12

## 2019-07-18 NOTE — ANESTHESIA POSTPROCEDURE EVALUATION
Procedure(s):  THIRD METATARSAL OSTEOTOMY WITH SCREW FIXATION RIGHT FOOT AND HAMMERTOE REPAIR ARTHRODESIS PROXIMAL JOINT THIRD TOE RIGHT  (MAC WITH LOCAL). general    Anesthesia Post Evaluation      Multimodal analgesia: multimodal analgesia used between 6 hours prior to anesthesia start to PACU discharge  Patient location during evaluation: bedside  Patient participation: waiting for patient participation  Level of consciousness: awake  Pain management: adequate  Airway patency: patent  Anesthetic complications: no  Cardiovascular status: acceptable  Respiratory status: unassisted  Hydration status: acceptable  Comments: Post-Anesthesia Evaluation and Assessment    I have evaluated the patient and they are ready for PACU discharge. Patient: Dick Tipton MRN: [id number]  SSN: [id number]   YOB: 1940  Age: 69 y.o. Sex: male      Cardiovascular Function/Vital Signs  /55   Pulse (!) 42   Temp 36.4 °C (97.5 °F)   Resp 12   Ht 5' 6\" (1.676 m)   Wt 88.9 kg (196 lb)   SpO2 95%   BMI 31.64 kg/m²     Patient is status post MAC anesthesia for Procedure(s):  THIRD METATARSAL OSTEOTOMY WITH SCREW FIXATION RIGHT FOOT AND HAMMERTOE REPAIR ARTHRODESIS PROXIMAL JOINT THIRD TOE RIGHT  (MAC WITH LOCAL). Nausea/Vomiting: None    Postoperative hydration reviewed and adequate. Pain:  Pain Scale 1: Visual (07/18/19 1040)  Pain Intensity 1: 0 (07/18/19 1040)   Managed    Neurological Status:   Neuro (WDL): Exceptions to WDL (07/18/19 1040)  Neuro  Neurologic State: Anesthetized (07/18/19 1040)   At baseline    Mental Status, Level of Consciousness: Alert and  oriented to person, place, and time    Pulmonary Status:   O2 Device: CO2 nasal cannula (07/18/19 1044)   Adequate oxygenation and airway patent    Complications related to anesthesia: None    Post-anesthesia assessment completed.  No concerns    Signed By: Alejandro Kee MD    July 18, 2019                   Vitals Value Taken Time   BP 132/114 7/18/2019 11:00 AM   Temp 36.4 °C (97.5 °F) 7/18/2019 10:44 AM   Pulse 51 7/18/2019 11:07 AM   Resp 16 7/18/2019 11:07 AM   SpO2 95 % 7/18/2019 11:07 AM   Vitals shown include unvalidated device data.

## 2019-07-18 NOTE — ANESTHESIA PREPROCEDURE EVALUATION
Relevant Problems   No relevant active problems       Anesthetic History   No history of anesthetic complications            Review of Systems / Medical History  Patient summary reviewed, nursing notes reviewed and pertinent labs reviewed    Pulmonary  Within defined limits                 Neuro/Psych   Within defined limits           Cardiovascular    Hypertension        Dysrhythmias   CAD         GI/Hepatic/Renal  Within defined limits              Endo/Other  Within defined limits           Other Findings              Physical Exam    Airway  Mallampati: II  TM Distance: > 6 cm  Neck ROM: normal range of motion   Mouth opening: Normal     Cardiovascular  Regular rate and rhythm,  S1 and S2 normal,  no murmur, click, rub, or gallop             Dental  No notable dental hx       Pulmonary  Breath sounds clear to auscultation               Abdominal  GI exam deferred       Other Findings            Anesthetic Plan    ASA: 3  Anesthesia type: MAC          Induction: Intravenous  Anesthetic plan and risks discussed with: Patient

## 2019-07-18 NOTE — BRIEF OP NOTE
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    Procedure(s):  THIRD METATARSAL OSTEOTOMY WITH SCREW FIXATION RIGHT FOOT AND HAMMERTOE REPAIR ARTHRODESIS PROXIMAL JOINT THIRD TOE RIGHT  (MAC WITH LOCAL)  Surgeon(s) and Role:     * Santana Quiros DPM - Primary         Surgical Assistant: n/a    Surgical Staff:  Circ-1: Sophia Benítez RN  Circ-Relief: Louis Cerda RN  Scrub Tech-1: Minnie Ford  Float Staff: Junior Deal RN  Event Time In Time Out   Incision Start 0930    Incision Close 1033      Anesthesia: MAC   Estimated Blood Loss: minimal  Specimens: * No specimens in log *   Findings: s/a   Complications: none  Implants:   Implant Name Type Inv.  Item Serial No.  Lot No. LRB No. Used Action   PIN KT RESRB TAPR 1.3X50MM -- ORTHOSORB W/K-WIRES - SNA  PIN KT RESRB TAPR 1.3X50MM -- ORTHOSORB W/K-WIRES NA [employer] [id number] Right 1 Implanted   trilliant 2.0mm screw   NA [employer] NA Right 1 Implanted

## 2019-07-18 NOTE — DISCHARGE INSTRUCTIONS
Soham Parish, URSULA  [address], [address]  Phone [phone] - Fax [phone]    AFTER YOUR SURGERY    CONGRATULATIONS! You have gone through a difficult ordeal.  However, we hope this will mean a marked improvement in the function of your feet. Your help is now needed to speed the healing process by carefully following these instructions. 1. If you have a problem, call the office. We want you to be comfortable. 2. Return home immediately. Although foot is numb and no pain felt, any undue used of foot results in unnecessary bleeding, post operative pain and delayed healing. 3. Rest as much as possible for 2-3 days. You deserve it. Do not work, drive or operate machinery. 4. Elevate the foot. Do not let it dangle; that will cause it to swell. Decrease walking and standing as much as possible; they cause swelling also. 5. An ice bag may be used to help control any discomfort. Place ice pack over the ankle for 15 minute intervals. Do not allow the ice pack to leak water on the bandages. 6. Call the office day or night if:  1. Bleeding is active or persistent. If a little bleeding comes through the bandage, do not worry. 2. You should bump or injure your foot. 3. If medication does not relieve discomfort. 4. If your toes appear blue or feel cold. 7. Do not loosen or remove or alter surgical dressings under any circumstances without first consulting the doctor - do not bathe foot or allow bandage to become wet - this may result in infection or retard healing. 8. When you go to sleep, lossen the be sheets. You may want to lay a box on its side and place your foot inside of it. This way your sheet will not irritate the surgical site. 9. Numbness can last from 6-40 hours. 10. Eat a well balanced diet and drink fruit juices for the next three weeks. Avoid excessive salt and salty food; they cause swelling.   11. For any questions you may have, remember we are as near as the telephone. DO NOT HESITATE TO CALL IF YOUR FEEL SOMETHING IS WRONG. PLEASE. Call [phone]. After business hours please call my cell phone [phone].    ______________________________________________________________________    Anesthesia Discharge Instructions    After general anesthesia or intervenous sedation, for 24 hours or while taking prescription Narcotics:  · Limit your activities  · Do not drive or operate hazardous machinery  · If you have not urinated within 8 hours after discharge, please contact your surgeon on call. · Do not make important personal or business decisions  · Do not drink alcoholic beverages    Report the following to your surgeon:  · Excessive pain, swelling, redness or odor of or around the surgical area  · Temperature over 100.5 degrees  · Nausea and vomiting lasting longer than 4 hours or if unable to take medication  · Any signs of decreased circulation or nerve impairment to extremity:  Change in color, persistent numbness, tingling, coldness or increased pain.   · Any questions

## 2019-07-20 NOTE — OP NOTES
[address] [facility]  OPERATIVE REPORT    Name:  Anjelica Landa  MR#:  [id number]  :  1940  ACCOUNT #:  [de-identified]  DATE OF SERVICE:  2019      SITE:  [facility]. PREOPERATIVE DIAGNOSES:  Metatarsalgia, third right, and hammertoe deformity, third toe right. POSTOPERATIVE DIAGNOSES:  Metatarsalgia, third right, and hammertoe deformity, third toe right. PROCEDURES PERFORMED:  1. Dorsiflexion and metatarsal osteotomy, third right with screw fixation. 2.  Hammertoe repair, arthrodesis of proximal joint, third toe, right, with Orthosorb pin fixation. SURGEON:  Dell Britton DPM    ASSISTANT:  No assistants. ANESTHESIA:  Local with IV sedation, local consisting of 0.25% Marcaine with 1% lidocaine with a total dilution of epinephrine of 1:400,000. COMPLICATIONS:  No complications. SPECIMENS REMOVED:  No specimens removed. IMPLANTS:  A 2.0 cortical screw and Orthosorb tapered pin. ESTIMATED BLOOD LOSS:  minmal    INDICATIONS:  The patient has presented with a history of painful plantar third metatarsophalangeal joint of the right foot over the past several years. The patient has requested definitive treatment. He has tried shoes, shoe inserts and padding without improvement. He has had a hammertoe repair of the second toe, right, and osteotomy of second metatarsal of the right foot approximately 5 years status post.  The patient has a semirigid contracture of proximal joint of the third toe of the right foot and proud third metatarsal head plantarly of the right foot with tenderness, erythema, and skin and subcutaneous fat atrophy after debridement of secondary hyperkeratotic lesion with subdermal hemorrhage. No skin wound or drainage.   Surgery including straightening of third toe right to offload plantar third metatarsophalageal joint and a dorsiflexion osteotomy of third metatarsal was discussed in detail including review of the preoperative x-rays and planned procedure. Risks, complications of the postoperative course were discussed with the patient which included possible transfer lesion to the fourth metatarsophalangeal joint. The patient had the opportunity to ask questions. All questions were answered to the patient's satisfaction. PROCEDURE:  The patient was brought to the operating room and placed in the supine position. After the patient was sedated, the right foot was locally anesthetized and prepped and draped in usual aseptic sterile manner. Attention was directed to the dorsal aspect of the third metatarsophalangeal joint where a 2.5-cm curvilinear incision was made. The incision was deepened through a sharp dissection. All bleeders encountered were clamped and ligated as necessary. All neurovascular structures were meticulously identified, preserved, and retracted. The extensor longus tendon was found to be contracted and was lengthened via a Z-slide technique and an extensor brevis tenotomy was also accomplished. The incision was further deepened exposing the capsule of the third metatarsophalangeal joint which was incised linearly. A [place] elevator was utilized to expose the metatarsal neck and head of the third metatarsal.  An incomplete osteotomy extending from distal dorsal to plantar proximal was made at the metatarsal neck. The plantar cortex hinge was left intact. The osteotomy was then held closed and found to be no longer proud plantarly. A 2.0 cortical screw was utilized to fixate the osteotomy. The osteotomy was found to be stable after fixation. Attention was now directed to the contracted third toe where a 1.5-cm longitudinal x 0.4-cm transverse elliptical incision was made overlying the proximal joint. The incision was deepened via sharp dissection. All bleeders encountered were clamped and ligated as necessary.   All neurovascular structures were meticulously identified, preserved, and retracted. The extensor tendon apparatus overlying the proximal joint was incised transversely and retracted dorsally and proximally. The articular surfaces of both the head of the proximal phalanx and base of the middle phalanx were resected with the use of a sagittal saw. The joint was then reapproximated in a rectus position. The joint was then fixated with an Orthosorb tapered pin, which was introduced from the dorsal shaft of the proximal phalanx exiting the distal aspect of the toe. After the joint was reapproximated, the proximal and distal ends of the absorbable pin were resected. The wound was flushed copiously with sterile saline. The extensor tendon apparatus was reapproximated with the use of 3-0 Vicryl and skin was closed with the use of 4-0 nylon suture. The toe was rectus in all planes postoperatively. The wounds were then dressed with Betadine-impregnated Adaptic gauze and Tamara dressing. A surgical shoe was dispensed with instructions on use. Both oral and written postoperative instructions were given to the patient. A postoperative prescription for Norco was prescribed. Followup will be in office.         URSULA [email or web address]  D:  07/19/2019 11:16  T:  07/19/2019 16:46  JOB #:  [id number]  CC:  Sadi Romo MD

## 2022-02-07 ENCOUNTER — HOSPITAL ENCOUNTER (OUTPATIENT)
Dept: PREADMISSION TESTING | Age: 82
Discharge: HOME | DRG: 388 | End: 2022-02-07
Payer: MEDICARE

## 2022-02-07 LAB
SARS-COV-2, XPLCVT: ABNORMAL
SOURCE, COVRS: ABNORMAL

## 2022-02-07 PROCEDURE — U0005 INFEC AGEN DETEC AMPLI PROBE: HCPCS

## 2022-02-08 ENCOUNTER — APPOINTMENT (OUTPATIENT)
Dept: GENERAL RADIOLOGY | Age: 82
DRG: 388 | End: 2022-02-08
Attending: EMERGENCY MEDICINE
Payer: MEDICARE

## 2022-02-08 ENCOUNTER — APPOINTMENT (OUTPATIENT)
Dept: CT IMAGING | Age: 82
DRG: 388 | End: 2022-02-08
Attending: EMERGENCY MEDICINE
Payer: MEDICARE

## 2022-02-08 ENCOUNTER — APPOINTMENT (OUTPATIENT)
Dept: GENERAL RADIOLOGY | Age: 82
DRG: 388 | End: 2022-02-08
Attending: SURGERY
Payer: MEDICARE

## 2022-02-08 ENCOUNTER — HOSPITAL ENCOUNTER (INPATIENT)
Age: 82
LOS: 3 days | Discharge: HOME | DRG: 388 | End: 2022-02-11
Attending: EMERGENCY MEDICINE | Admitting: SURGERY
Payer: MEDICARE

## 2022-02-08 DIAGNOSIS — I71.43 ANEURYSM OF INFRARENAL ABDOMINAL AORTA: ICD-10-CM

## 2022-02-08 DIAGNOSIS — K56.609 SMALL BOWEL OBSTRUCTION (HCC): Primary | ICD-10-CM

## 2022-02-08 DIAGNOSIS — K22.89 ESOPHAGEAL THICKENING: ICD-10-CM

## 2022-02-08 DIAGNOSIS — U07.1 COVID-19: ICD-10-CM

## 2022-02-08 LAB
ALBUMIN SERPL-MCNC: 4 G/DL (ref 3.5–5)
ALBUMIN/GLOB SERPL: 0.9 {RATIO} (ref 1.1–2.2)
ALP SERPL-CCNC: 124 U/L (ref 45–117)
ALT SERPL-CCNC: 27 U/L (ref 12–78)
ANION GAP SERPL CALC-SCNC: 6 MMOL/L (ref 5–15)
AST SERPL-CCNC: 16 U/L (ref 15–37)
ATRIAL RATE: 81 BPM
BASOPHILS # BLD: 0.1 K/UL (ref 0–0.1)
BASOPHILS NFR BLD: 0 % (ref 0–1)
BILIRUB SERPL-MCNC: 0.6 MG/DL (ref 0.2–1)
BUN SERPL-MCNC: 17 MG/DL (ref 6–20)
BUN/CREAT SERPL: 18 (ref 12–20)
CALCIUM SERPL-MCNC: 9.8 MG/DL (ref 8.5–10.1)
CALCULATED P AXIS, ECG09: 52 DEGREES
CALCULATED R AXIS, ECG10: -35 DEGREES
CALCULATED T AXIS, ECG11: 85 DEGREES
CHLORIDE SERPL-SCNC: 103 MMOL/L (ref 97–108)
CO2 SERPL-SCNC: 28 MMOL/L (ref 21–32)
COVID-19 RAPID TEST, COVR: DETECTED
CREAT SERPL-MCNC: 0.95 MG/DL (ref 0.7–1.3)
DIAGNOSIS, 93000: NORMAL
DIFFERENTIAL METHOD BLD: ABNORMAL
EOSINOPHIL # BLD: 0.1 K/UL (ref 0–0.4)
EOSINOPHIL NFR BLD: 0 % (ref 0–7)
ERYTHROCYTE [DISTWIDTH] IN BLOOD BY AUTOMATED COUNT: 13.2 % (ref 11.5–14.5)
GLOBULIN SER CALC-MCNC: 4.4 G/DL (ref 2–4)
GLUCOSE SERPL-MCNC: 130 MG/DL (ref 65–100)
HCT VFR BLD AUTO: 49.1 % (ref 36.6–50.3)
HGB BLD-MCNC: 16.6 G/DL (ref 12.1–17)
IMM GRANULOCYTES # BLD AUTO: 0.1 K/UL (ref 0–0.04)
IMM GRANULOCYTES NFR BLD AUTO: 0 % (ref 0–0.5)
LIPASE SERPL-CCNC: 109 U/L (ref 73–393)
LYMPHOCYTES # BLD: 1.2 K/UL (ref 0.8–3.5)
LYMPHOCYTES NFR BLD: 8 % (ref 12–49)
MCH RBC QN AUTO: 29.9 PG (ref 26–34)
MCHC RBC AUTO-ENTMCNC: 33.8 G/DL (ref 30–36.5)
MCV RBC AUTO: 88.5 FL (ref 80–99)
MONOCYTES # BLD: 0.7 K/UL (ref 0–1)
MONOCYTES NFR BLD: 5 % (ref 5–13)
NEUTS SEG # BLD: 12.6 K/UL (ref 1.8–8)
NEUTS SEG NFR BLD: 87 % (ref 32–75)
NRBC # BLD: 0 K/UL (ref 0–0.01)
NRBC BLD-RTO: 0 PER 100 WBC
P-R INTERVAL, ECG05: 172 MS
PLATELET # BLD AUTO: 240 K/UL (ref 150–400)
PMV BLD AUTO: 10.2 FL (ref 8.9–12.9)
POTASSIUM SERPL-SCNC: 4.3 MMOL/L (ref 3.5–5.1)
PROT SERPL-MCNC: 8.4 G/DL (ref 6.4–8.2)
Q-T INTERVAL, ECG07: 370 MS
QRS DURATION, ECG06: 98 MS
QTC CALCULATION (BEZET), ECG08: 429 MS
RBC # BLD AUTO: 5.55 M/UL (ref 4.1–5.7)
SODIUM SERPL-SCNC: 137 MMOL/L (ref 136–145)
SOURCE, COVRS: ABNORMAL
VENTRICULAR RATE, ECG03: 81 BPM
WBC # BLD AUTO: 14.7 K/UL (ref 4.1–11.1)

## 2022-02-08 PROCEDURE — 87635 SARS-COV-2 COVID-19 AMP PRB: CPT

## 2022-02-08 PROCEDURE — 96374 THER/PROPH/DIAG INJ IV PUSH: CPT

## 2022-02-08 PROCEDURE — 74177 CT ABD & PELVIS W/CONTRAST: CPT

## 2022-02-08 PROCEDURE — 65270000029 HC RM PRIVATE

## 2022-02-08 PROCEDURE — 99284 EMERGENCY DEPT VISIT MOD MDM: CPT

## 2022-02-08 PROCEDURE — 99222 1ST HOSP IP/OBS MODERATE 55: CPT | Performed by: SURGERY

## 2022-02-08 PROCEDURE — 74011250636 HC RX REV CODE- 250/636: Performed by: EMERGENCY MEDICINE

## 2022-02-08 PROCEDURE — 74011250636 HC RX REV CODE- 250/636: Performed by: SURGERY

## 2022-02-08 PROCEDURE — 71046 X-RAY EXAM CHEST 2 VIEWS: CPT

## 2022-02-08 PROCEDURE — 74011000250 HC RX REV CODE- 250: Performed by: SURGERY

## 2022-02-08 PROCEDURE — 80053 COMPREHEN METABOLIC PANEL: CPT

## 2022-02-08 PROCEDURE — 71045 X-RAY EXAM CHEST 1 VIEW: CPT

## 2022-02-08 PROCEDURE — 85025 COMPLETE CBC W/AUTO DIFF WBC: CPT

## 2022-02-08 PROCEDURE — 36415 COLL VENOUS BLD VENIPUNCTURE: CPT

## 2022-02-08 PROCEDURE — 93005 ELECTROCARDIOGRAM TRACING: CPT

## 2022-02-08 PROCEDURE — 74011000636 HC RX REV CODE- 636: Performed by: EMERGENCY MEDICINE

## 2022-02-08 PROCEDURE — 83690 ASSAY OF LIPASE: CPT

## 2022-02-08 PROCEDURE — 74018 RADEX ABDOMEN 1 VIEW: CPT

## 2022-02-08 RX ORDER — HYDROMORPHONE HYDROCHLORIDE 1 MG/ML
1 INJECTION, SOLUTION INTRAMUSCULAR; INTRAVENOUS; SUBCUTANEOUS ONCE
Status: COMPLETED | OUTPATIENT
Start: 2022-02-08 | End: 2022-02-08

## 2022-02-08 RX ORDER — ONDANSETRON 2 MG/ML
4 INJECTION INTRAMUSCULAR; INTRAVENOUS
Status: DISCONTINUED | OUTPATIENT
Start: 2022-02-08 | End: 2022-02-11 | Stop reason: HOSPADM

## 2022-02-08 RX ORDER — HYDROMORPHONE HYDROCHLORIDE 1 MG/ML
0.5 INJECTION, SOLUTION INTRAMUSCULAR; INTRAVENOUS; SUBCUTANEOUS
Status: DISCONTINUED | OUTPATIENT
Start: 2022-02-08 | End: 2022-02-11 | Stop reason: HOSPADM

## 2022-02-08 RX ORDER — LORAZEPAM 2 MG/ML
1 INJECTION INTRAMUSCULAR ONCE
Status: COMPLETED | OUTPATIENT
Start: 2022-02-08 | End: 2022-02-08

## 2022-02-08 RX ORDER — ONDANSETRON 2 MG/ML
4 INJECTION INTRAMUSCULAR; INTRAVENOUS
Status: COMPLETED | OUTPATIENT
Start: 2022-02-08 | End: 2022-02-08

## 2022-02-08 RX ORDER — LEVOFLOXACIN 5 MG/ML
750 INJECTION, SOLUTION INTRAVENOUS EVERY 24 HOURS
Status: DISCONTINUED | OUTPATIENT
Start: 2022-02-08 | End: 2022-02-11

## 2022-02-08 RX ORDER — METRONIDAZOLE 500 MG/100ML
500 INJECTION, SOLUTION INTRAVENOUS EVERY 12 HOURS
Status: DISCONTINUED | OUTPATIENT
Start: 2022-02-08 | End: 2022-02-11

## 2022-02-08 RX ORDER — SODIUM CHLORIDE 9 MG/ML
125 INJECTION, SOLUTION INTRAVENOUS CONTINUOUS
Status: DISCONTINUED | OUTPATIENT
Start: 2022-02-08 | End: 2022-02-11 | Stop reason: HOSPADM

## 2022-02-08 RX ADMIN — METRONIDAZOLE 500 MG: 500 INJECTION, SOLUTION INTRAVENOUS at 18:02

## 2022-02-08 RX ADMIN — SODIUM CHLORIDE 500 ML: 9 INJECTION, SOLUTION INTRAVENOUS at 14:07

## 2022-02-08 RX ADMIN — SODIUM CHLORIDE 1000 ML: 9 INJECTION, SOLUTION INTRAVENOUS at 15:31

## 2022-02-08 RX ADMIN — ONDANSETRON 4 MG: 2 INJECTION INTRAMUSCULAR; INTRAVENOUS at 14:08

## 2022-02-08 RX ADMIN — BENZOCAINE 1 SPRAY: 200 SPRAY DENTAL; ORAL; PERIODONTAL at 15:31

## 2022-02-08 RX ADMIN — LEVOFLOXACIN 750 MG: 5 INJECTION, SOLUTION INTRAVENOUS at 18:02

## 2022-02-08 RX ADMIN — IOPAMIDOL 100 ML: 755 INJECTION, SOLUTION INTRAVENOUS at 13:50

## 2022-02-08 RX ADMIN — SODIUM CHLORIDE 125 ML/HR: 9 INJECTION, SOLUTION INTRAVENOUS at 18:01

## 2022-02-08 RX ADMIN — HYDROMORPHONE HYDROCHLORIDE 1 MG: 1 INJECTION, SOLUTION INTRAMUSCULAR; INTRAVENOUS; SUBCUTANEOUS at 15:31

## 2022-02-08 RX ADMIN — LORAZEPAM 1 MG: 2 INJECTION INTRAMUSCULAR; INTRAVENOUS at 15:31

## 2022-02-08 NOTE — PROGRESS NOTES
1643: TRANSFER - IN REPORT:    Verbal report received from Corey Harden, [facility] (name) on Ary Flores  being received from ED (unit) for routine progression of care      Report consisted of patients Situation, Background, Assessment and   Recommendations(SBAR). Information from the following report(s) SBAR, Kardex, ED Summary, Intake/Output, MAR, Recent Results and Cardiac Rhythm NSR was reviewed with the receiving nurse. Opportunity for questions and clarification was provided. Assessment completed upon patients arrival to unit and care assumed. 1700: Pt arrived on the floor. VSS. Dual skin completed with DEREK Tripathi.    1900: Bedside shift change report given to Thanh Graves RN (oncoming nurse) by Deronda Sandhoff, RN (offgoing nurse). Report included the following information SBAR, Kardex, ED Summary, Intake/Output, MAR and Recent Results.

## 2022-02-08 NOTE — ED PROVIDER NOTES
EMERGENCY DEPARTMENT HISTORY AND PHYSICAL EXAM      Date: 2/8/2022  Patient Name: Milad Shah    History of Presenting Illness     Chief Complaint   Patient presents with    Nausea    Vomiting       History Provided By: Patient    HPI: Milad Shah, [de-identified] y.o. male presents to the ED with cc of concern for COVID-23    28-year-old male with no significant past medical history presents emergency department with achievement of concern for COVID-19. Patient reports his wife was admitted for Covid pneumonia, recently discharged from rehab. Patient reports last night he was eating and had diminished taste. Reports subsequently had multiple episodes of nausea and vomiting. Patient reports last episode of nausea and vomiting in the emergency department. Patient reports he is in his normal state of health. Denies headache, denies chest pain, patient reports mild abdominal pain with vomiting. No diarrhea. No leg swelling. Patient has had 3 COVID vaccines. No fevers. There are no other complaints, changes, or physical findings at this time. PCP: None    No current facility-administered medications on file prior to encounter. Current Outpatient Medications on File Prior to Encounter   Medication Sig Dispense Refill    losartan (COZAAR) 25 mg tablet Take 25 mg by mouth daily.  acetaminophen (TylenoL) 325 mg tablet Take 650 mg by mouth every four (4) hours as needed for Pain.  vit A/vit C/vit E/zinc/copper (PRESERVISION AREDS PO) Take 1 Tablet by mouth daily.  multivitamin (ONE A DAY) tablet Take 1 Tab by mouth daily.  omega-3 fatty acids-vitamin e (FISH OIL) 1,000 mg Cap Take 1 Cap by mouth daily.            Past History     Past Medical History:  Past Medical History:   Diagnosis Date    CAD (coronary artery disease)     3 stents    Diverticulosis     Hammer toe 7/18/2019    Hammertoe of right foot     Hypertension     Metatarsalgia, right foot 7/18/2019    Sinus bradycardia        Past Surgical History:  Past Surgical History:   Procedure Laterality Date    HX GI  age 29    colon procedure    HX HEART CATHETERIZATION  prior to 2005    x3 stents    HX ORTHOPAEDIC Right     foot    HX TONSILLECTOMY         Family History:  History reviewed. No pertinent family history. Social History:  Social History     Tobacco Use    Smoking status: Former Smoker    Smokeless tobacco: Never Used   Substance Use Topics    Alcohol use: Not Currently    Drug use: Never       Allergies:  No Known Allergies      Review of Systems   Review of Systems   Constitutional: Positive for appetite change. Negative for chills and fever. HENT: Negative for voice change. Eyes: Negative for pain and redness. Respiratory: Negative for cough and chest tightness. Cardiovascular: Negative for chest pain and leg swelling. Gastrointestinal: Positive for nausea and vomiting. Negative for abdominal pain and diarrhea. Genitourinary: Negative for hematuria. Musculoskeletal: Negative for gait problem. Skin: Negative for color change, pallor and rash. Neurological: Negative for facial asymmetry, weakness and headaches. Hematological: Does not bruise/bleed easily. Psychiatric/Behavioral: Negative for behavioral problems. All other systems reviewed and are negative. Physical Exam   Physical Exam  Vitals and nursing note reviewed. Constitutional:       Comments: 81-year-old male, resting on stretcher, no acute distress   HENT:      Head: Normocephalic and atraumatic. Nose: Nose normal.      Mouth/Throat:      Mouth: Mucous membranes are moist.   Eyes:      Pupils: Pupils are equal, round, and reactive to light. Cardiovascular:      Rate and Rhythm: Normal rate and regular rhythm. Pulses: Normal pulses. Heart sounds: No murmur heard. No friction rub. No gallop. Pulmonary:      Effort: Pulmonary effort is normal.      Breath sounds: Normal breath sounds.  No wheezing, rhonchi or rales. Comments: Saturating 94% on room air  Abdominal:      General: Abdomen is flat. There is no distension. Palpations: Abdomen is soft. Tenderness: There is no abdominal tenderness. Musculoskeletal:         General: Normal range of motion. Cervical back: Normal range of motion. Right lower leg: No edema. Left lower leg: No edema. Skin:     General: Skin is warm and dry. Capillary Refill: Capillary refill takes less than 2 seconds. Neurological:      General: No focal deficit present. Mental Status: He is alert. Psychiatric:         Mood and Affect: Mood normal.         Diagnostic Study Results     Labs -     Recent Results (from the past 12 hour(s))   CBC WITH AUTOMATED DIFF    Collection Time: 02/08/22 12:28 PM   Result Value Ref Range    WBC 14.7 (H) 4.1 - 11.1 K/uL    RBC 5.55 4.10 - 5.70 M/uL    HGB 16.6 12.1 - 17.0 g/dL    HCT 49.1 36.6 - 50.3 %    MCV 88.5 80.0 - 99.0 FL    MCH 29.9 26.0 - 34.0 PG    MCHC 33.8 30.0 - 36.5 g/dL    RDW 13.2 11.5 - 14.5 %    PLATELET 822 546 - 136 K/uL    MPV 10.2 8.9 - 12.9 FL    NRBC 0.0 0  WBC    ABSOLUTE NRBC 0.00 0.00 - 0.01 K/uL    NEUTROPHILS 87 (H) 32 - 75 %    LYMPHOCYTES 8 (L) 12 - 49 %    MONOCYTES 5 5 - 13 %    EOSINOPHILS 0 0 - 7 %    BASOPHILS 0 0 - 1 %    IMMATURE GRANULOCYTES 0 0.0 - 0.5 %    ABS. NEUTROPHILS 12.6 (H) 1.8 - 8.0 K/UL    ABS. LYMPHOCYTES 1.2 0.8 - 3.5 K/UL    ABS. MONOCYTES 0.7 0.0 - 1.0 K/UL    ABS. EOSINOPHILS 0.1 0.0 - 0.4 K/UL    ABS. BASOPHILS 0.1 0.0 - 0.1 K/UL    ABS. IMM.  GRANS. 0.1 (H) 0.00 - 0.04 K/UL    DF AUTOMATED     METABOLIC PANEL, COMPREHENSIVE    Collection Time: 02/08/22 12:28 PM   Result Value Ref Range    Sodium 137 136 - 145 mmol/L    Potassium 4.3 3.5 - 5.1 mmol/L    Chloride 103 97 - 108 mmol/L    CO2 28 21 - 32 mmol/L    Anion gap 6 5 - 15 mmol/L    Glucose 130 (H) 65 - 100 mg/dL    BUN 17 6 - 20 MG/DL    Creatinine 0.95 0.70 - 1.30 MG/DL BUN/Creatinine ratio 18 12 - 20      GFR est AA >60 >60 ml/min/1.73m2    GFR est non-AA >60 >60 ml/min/1.73m2    Calcium 9.8 8.5 - 10.1 MG/DL    Bilirubin, total 0.6 0.2 - 1.0 MG/DL    ALT (SGPT) 27 12 - 78 U/L    AST (SGOT) 16 15 - 37 U/L    Alk. phosphatase 124 (H) 45 - 117 U/L    Protein, total 8.4 (H) 6.4 - 8.2 g/dL    Albumin 4.0 3.5 - 5.0 g/dL    Globulin 4.4 (H) 2.0 - 4.0 g/dL    A-G Ratio 0.9 (L) 1.1 - 2.2     COVID-19 RAPID TEST    Collection Time: 02/08/22 12:28 PM   Result Value Ref Range    Specimen source Nasopharyngeal      COVID-19 rapid test Detected (A) NOTD     LIPASE    Collection Time: 02/08/22 12:28 PM   Result Value Ref Range    Lipase 109 73 - 393 U/L   EKG, 12 LEAD, INITIAL    Collection Time: 02/08/22  2:08 PM   Result Value Ref Range    Ventricular Rate 81 BPM    Atrial Rate 81 BPM    P-R Interval 172 ms    QRS Duration 98 ms    Q-T Interval 370 ms    QTC Calculation (Bezet) 429 ms    Calculated P Axis 52 degrees    Calculated R Axis -35 degrees    Calculated T Axis 85 degrees    Diagnosis       Sinus rhythm with sinus arrhythmia with occasional premature ventricular   complexes  Left axis deviation    Confirmed by Сергей Chowdhury MD, Allie Jorge ([id number]) on 2/8/2022 3:14:27 PM         Radiologic Studies -   XR ABD (KUB)   Final Result   NG tube in place                     CT ABD PELV W CONT   Final Result   1. Small bowel obstruction. Transition point at the level of the right mid   abdominal ileum where there is a thickened segment measuring approximately 40 cm   in length. Segment of mildly thickened proximal jejunum. 2. Circumferential thickening of the distal esophagus. Recommend endoscopy for   further evaluation. 3. Increase in size of infrarenal fusiform abdominal aortic aneurysm, now   measuring 3 cm in AP dimension. XR CHEST PORT   Final Result   No confluent infiltrate.  Stable elevation of the right hemidiaphragm      XR CHEST PA LAT    (Results Pending)     CT Results  (Last 48 hours)               02/08/22 1350  CT ABD PELV W CONT Final result    Impression:  1. Small bowel obstruction. Transition point at the level of the right mid   abdominal ileum where there is a thickened segment measuring approximately 40 cm   in length. Segment of mildly thickened proximal jejunum. 2. Circumferential thickening of the distal esophagus. Recommend endoscopy for   further evaluation. 3. Increase in size of infrarenal fusiform abdominal aortic aneurysm, now   measuring 3 cm in AP dimension. Narrative:  INDICATION: Vomiting, epigastric pain, nausea, Covid19. CT of the abdomen and pelvis is performed with 5 mm collimation. Study is   performed with 100 cc of nonionic Isovue 370. Sagittal and coronal reformatted   images were also performed. CT dose reduction was achieved with the use of the standardized protocol   tailored for this examination and automatic exposure control for dose   modulation. Direct comparison is made to prior CT dated July 2012. Findings:       Lung bases: There is mild right basilar atelectasis. Visualized lung bases are   otherwise clear. Liver: The liver is normal.       Adrenals: Adrenal glands are normal.       Pancreas: The pancreas is normal.       Gallbladder: There are several punctate gallstones within the gallbladder; there   is no CT evidence of acute cholecystitis. Kidneys: There are several bilateral renal cysts, the largest which arises from   the left kidney and measures 7.5 cm in diameter. There are several subcentimeter   renal hypodensities, too small to further characterize, but are statistically   likely to represent cysts. Kidneys otherwise enhance promptly and symmetrically. There is no hydronephrosis. Spleen: The spleen is normal.       Abdominal aorta:  There is mild fusiform dilatation of the distal abdominal aorta   which measures 3 cm in AP dimension and it measured 2.7 cm in AP dimension on   prior CT dated July 2012. The mid abdominal aorta measures 1.9 cm in AP   dimension. Diffuse atherosclerotic calcifications are within the abdominal aorta   and iliac arteries. Lymph nodes. There is no dayana hepatitis, mesenteric, retroperitoneal or pelvic   lymphadenopathy. Stomach/Bowel: There are multiple dilated, fluid-filled loops of small bowel to   level of the right mid abdomen, where there is a thickened, long segment of the   ileum and there is a transition point at this level. The thickened ileum   measures approximately 40 cm in length. The more distal ileal loops and terminal   ileum are decompressed and did not appear thickened. The colon is nondilated. There is also a segment of mildly thickened proximal jejunum. It should be noted   that the stomach is somewhat distended as well and there is fluid within the   distal visualized esophagus suggesting reflux. There appears to be   circumferential thickening of the distal esophagus. Appendix: The appendix is normal.       Urinary bladder: Urinary bladder is partially filled and grossly normal.       Miscellaneous: There is no free intraperitoneal fluid or gas. There is no focal   fluid collection to suggest abscess. CXR Results  (Last 48 hours)               02/08/22 1252  XR CHEST PORT Final result    Impression:  No confluent infiltrate. Stable elevation of the right hemidiaphragm       Narrative:  EXAM: XR CHEST PORT       INDICATION: Covid Symptoms       COMPARISON: 3/22/2019       FINDINGS: A portable AP radiograph of the chest was obtained at 1244 hours. The   patient is on a cardiac monitor. There is no confluent infiltrate. The cardiac   and mediastinal contours and pulmonary vascularity are normal.  The bones and   soft tissues are grossly within normal limits. There is elevation of the right   hemidiaphragm. Medical Decision Making   I am the first provider for this patient.     I reviewed the vital signs, available nursing notes, past medical history, past surgical history, family history and social history. Vital Signs-Reviewed the patient's vital signs. Patient Vitals for the past 12 hrs:   Temp Pulse Resp BP SpO2   02/08/22 1701 98.1 °F (36.7 °C) 78 16 133/69 100 %   02/08/22 1636    123/80 97 %   02/08/22 1606    127/65 (!) 86 %   02/08/22 1208 98 °F (36.7 °C) 96 16 (!) 157/83 96 %     Records Reviewed: Nursing Notes and Old Medical Records    Provider Notes (Medical Decision Making):     81-year-old male presents emergency department with a chief complaint of nausea and vomiting. Patient has been exposed to some with COVID-19, given loss of taste this is on the differential.  Other considerations could pancreatitis, cholecystitis, small bowel obstruction, gastroenteritis. Given age and abdominal pain will check CT abdomen and pelvis. Check basic labs, fluids and medicate. Reassess. ED Course:   Initial assessment performed. The patients presenting problems have been discussed, and they are in agreement with the care plan formulated and outlined with them. I have encouraged them to ask questions as they arise throughout their visit. ED Course as of 02/08/22 1931 Tue Feb 08, 2022   1433 Labs reviewed, mild leukocytosis, CMP unremarkable. COVID-19 detected. Lipase normal. [MB]   1434 CT does show a small bowel obstruction as well as initial esophageal dilation and large infrarenal aneurysm. [MB]   0047 Updated patient, d/w Dr. Suhail Estrada for admission, consult hospitalist for patient's COVID + status. [MB]      ED Course User Index  [MB] MD Nickolas Sifuentes MD      Disposition:    Admitted      Diagnosis     Clinical Impression:   1. Small bowel obstruction ([address]    2. COVID-19    3. Esophageal thickening    4.  Aneurysm of infrarenal abdominal aorta (HCC)        Attestations:    Nickolas Brown MD    Please note that this dictation was completed with [employer]hon [employer]le[employer] computer voice recognition software. Quite often unanticipated grammatical, syntax, homophones, and other interpretive errors are inadvertently transcribed by the computer software. Please disregard these errors. Please excuse any errors that have escaped final proofreading. Thank you.

## 2022-02-08 NOTE — ED NOTES
Patient is being transferred to [address], Room # 2101. Report given to [address] on [facility] for routine progression of care. Report consisted of the following information SBAR, ED Summary, Intake/Output, MAR and Recent Results. Patient transferred to receiving unit by: transport (RN or tech name). Outstanding consults needed: No     Next labs due: No     The following personal items will be sent with the patient during transfer to the floor: All valuables:    Cardiac monitoring ordered: Yes    The following CURRENT information was reported to the receiving RN:    Code status: No Order at time of transfer    Last set of vital signs:  Vital Signs  Level of Consciousness: Alert (0) (02/08/22 1208)  Temp: 98 °F (36.7 °C) (02/08/22 1208)  Temp Source: Oral (02/08/22 1208)  Pulse (Heart Rate): 96 (02/08/22 1208)  Resp Rate: 16 (02/08/22 1208)  BP: 123/80 (02/08/22 1636)  MAP (Monitor): 93 (02/08/22 1636)  MAP (Calculated): 94 (02/08/22 1636)  MEWS Score: 1 (02/08/22 1208)         Oxygen Therapy  O2 Sat (%): 97 % (02/08/22 1636)  Pulse via Oximetry: 76 beats per minute (02/08/22 1636)      Last pain assessment:  Pain 1  Pain Scale 1: Numeric (0 - 10)  Pain Intensity 1: 5  Pain Onset 1: today  Pain Location 1: Abdomen  Pain Orientation 1: Anterior  Pain Description 1: Aching  Pain Intervention(s) 1: Therapeutic presence      Wounds: No     Urinary catheter: voiding  Is there a toribio order: No     LDAs:       Peripheral IV 02/08/22 Distal;Right Cephalic (Active)     Nasogastric Tube 02/08/22 (Active)   External Insertion Dioni (cms) 67 cms 02/08/22 1553        Opportunity for questions and clarification was provided.     Wilder Chapman RN

## 2022-02-08 NOTE — H&P
Surgery History and Physcial    Subjective:      Mary Malagon is a [de-identified] y.o. male who presents for evaluation of abdominal pain, protracted nausea and vomiting. Symptoms began last night when he had loss of taste with dinner and promptly developed nausea and pain. Aggravating factors include movement. Alleviating factors include none. Pt had normal BM last evening. His wife was just released from rehab related to [facility] and he has had 3 vaccines. He is COVID + by rapid test today. CT shows high grade SBO secondary to ileal focal area of wall thickening distal SB 40 cm in length. There is also focal proximal jejunal thickening. Pt has CAD s/p stents x 3 about 15 years ago, no h/o MI. Mild HTN. PSH also includes surgery for microperforation of colon x 3 operations, many years ago. Patient Active Problem List    Diagnosis Date Noted    Small bowel obstruction ([address] 02/08/2022    COVID-19 02/08/2022     Past Medical History:   Diagnosis Date    CAD (coronary artery disease)     3 stents    Diverticulosis     Hammer toe 7/18/2019    Hammertoe of right foot     Hypertension     Metatarsalgia, right foot 7/18/2019    Sinus bradycardia       Past Surgical History:   Procedure Laterality Date    HX GI  age 29    colon procedure    HX HEART CATHETERIZATION  prior to 2005    x3 stents    HX ORTHOPAEDIC Right     foot    HX TONSILLECTOMY        Social History     Tobacco Use    Smoking status: Former Smoker    Smokeless tobacco: Never Used   Substance Use Topics    Alcohol use: Not Currently      History reviewed. No pertinent family history. Prior to Admission medications    Medication Sig Start Date End Date Taking? Authorizing Provider   losartan (COZAAR) 25 mg tablet Take 25 mg by mouth daily. Provider, Historical   acetaminophen (TylenoL) 325 mg tablet Take 650 mg by mouth every four (4) hours as needed for Pain.     Provider, Historical   vit A/vit C/vit E/zinc/copper (PRESERVISION AREDS PO) Take 1 Tablet by mouth daily. Provider, Chuy   multivitamin (ONE A DAY) tablet Take 1 Tab by mouth daily. Other, MD Nola   omega-3 fatty acids-vitamin e (FISH OIL) 1,000 mg Cap Take 1 Cap by mouth daily. Nola Dela Cruz MD     No Known Allergies      Review of Systems   Constitutional: Positive for appetite change. Negative for chills, diaphoresis and fever. Respiratory: Negative for shortness of breath and wheezing. Cardiovascular: Negative for chest pain and palpitations. Gastrointestinal: Positive for abdominal pain, nausea and vomiting. Negative for constipation and diarrhea. Musculoskeletal: Negative for myalgias. Hematological: Does not bruise/bleed easily. All other systems reviewed and are negative. Objective:     Visit Vitals  /80   Pulse 96   Temp 98 °F (36.7 °C)   Resp 16   Ht 5' 6\" (1.676 m)   Wt 185 lb (83.9 kg)   SpO2 97%   BMI 29.86 kg/m²       Physical Exam  Constitutional:       General: He is not in acute distress. Appearance: He is well-developed. HENT:      Head: Normocephalic and atraumatic. Eyes:      General: No scleral icterus. Pupils: Pupils are equal, round, and reactive to light. Cardiovascular:      Rate and Rhythm: Normal rate and regular rhythm. Heart sounds: Normal heart sounds. Pulmonary:      Breath sounds: Normal breath sounds. No wheezing or rales. Abdominal:      General: Abdomen is protuberant. Bowel sounds are normal. There is no distension. Palpations: Abdomen is soft. There is no mass. Tenderness: There is generalized abdominal tenderness. There is no guarding or rebound. Comments: No focal peritoneal signs   Musculoskeletal:         General: Normal range of motion. Lymphadenopathy:      Cervical: No cervical adenopathy. Neurological:      General: No focal deficit present. Mental Status: He is alert and oriented to person, place, and time.          Imaging:  images and reports reviewed    Lab Review:    Recent Results (from the past 24 hour(s))   CBC WITH AUTOMATED DIFF    Collection Time: 02/08/22 12:28 PM   Result Value Ref Range    WBC 14.7 (H) 4.1 - 11.1 K/uL    RBC 5.55 4.10 - 5.70 M/uL    HGB 16.6 12.1 - 17.0 g/dL    HCT 49.1 36.6 - 50.3 %    MCV 88.5 80.0 - 99.0 FL    MCH 29.9 26.0 - 34.0 PG    MCHC 33.8 30.0 - 36.5 g/dL    RDW 13.2 11.5 - 14.5 %    PLATELET 598 925 - 071 K/uL    MPV 10.2 8.9 - 12.9 FL    NRBC 0.0 0  WBC    ABSOLUTE NRBC 0.00 0.00 - 0.01 K/uL    NEUTROPHILS 87 (H) 32 - 75 %    LYMPHOCYTES 8 (L) 12 - 49 %    MONOCYTES 5 5 - 13 %    EOSINOPHILS 0 0 - 7 %    BASOPHILS 0 0 - 1 %    IMMATURE GRANULOCYTES 0 0.0 - 0.5 %    ABS. NEUTROPHILS 12.6 (H) 1.8 - 8.0 K/UL    ABS. LYMPHOCYTES 1.2 0.8 - 3.5 K/UL    ABS. MONOCYTES 0.7 0.0 - 1.0 K/UL    ABS. EOSINOPHILS 0.1 0.0 - 0.4 K/UL    ABS. BASOPHILS 0.1 0.0 - 0.1 K/UL    ABS. IMM. GRANS. 0.1 (H) 0.00 - 0.04 K/UL    DF AUTOMATED     METABOLIC PANEL, COMPREHENSIVE    Collection Time: 02/08/22 12:28 PM   Result Value Ref Range    Sodium 137 136 - 145 mmol/L    Potassium 4.3 3.5 - 5.1 mmol/L    Chloride 103 97 - 108 mmol/L    CO2 28 21 - 32 mmol/L    Anion gap 6 5 - 15 mmol/L    Glucose 130 (H) 65 - 100 mg/dL    BUN 17 6 - 20 MG/DL    Creatinine 0.95 0.70 - 1.30 MG/DL    BUN/Creatinine ratio 18 12 - 20      GFR est AA >60 >60 ml/min/1.73m2    GFR est non-AA >60 >60 ml/min/1.73m2    Calcium 9.8 8.5 - 10.1 MG/DL    Bilirubin, total 0.6 0.2 - 1.0 MG/DL    ALT (SGPT) 27 12 - 78 U/L    AST (SGOT) 16 15 - 37 U/L    Alk.  phosphatase 124 (H) 45 - 117 U/L    Protein, total 8.4 (H) 6.4 - 8.2 g/dL    Albumin 4.0 3.5 - 5.0 g/dL    Globulin 4.4 (H) 2.0 - 4.0 g/dL    A-G Ratio 0.9 (L) 1.1 - 2.2     COVID-19 RAPID TEST    Collection Time: 02/08/22 12:28 PM   Result Value Ref Range    Specimen source Nasopharyngeal      COVID-19 rapid test Detected (A) NOTD     LIPASE    Collection Time: 02/08/22 12:28 PM   Result Value Ref Range    Lipase 109 73 - 393 U/L   EKG, 12 LEAD, INITIAL    Collection Time: 02/08/22  2:08 PM   Result Value Ref Range    Ventricular Rate 81 BPM    Atrial Rate 81 BPM    P-R Interval 172 ms    QRS Duration 98 ms    Q-T Interval 370 ms    QTC Calculation (Bezet) 429 ms    Calculated P Axis 52 degrees    Calculated R Axis -35 degrees    Calculated T Axis 85 degrees    Diagnosis       Sinus rhythm with sinus arrhythmia with occasional premature ventricular   complexes  Left axis deviation    Confirmed by Apple Fontaine MD, Artis Primrose ([id number]) on 2/8/2022 3:14:27 PM           Assessment:     [de-identified] yo man with incidental COVID + status, presents with new onset SBO with CT findings of small bowel wall thickening as cause. Plan:     I recommend proceeding with Conservative therapy:  Observation, Bowel rest and NGT decompression. Empiric Abx given ileitis on imaging. Stool studies. May require GI involvement if this does not resolve quickly to evaluate for possible IBD.   Hospitalist consult for COVID mgmt

## 2022-02-08 NOTE — ED NOTES
PT. Presents to ED today for complaints of nausea/vomiting and loss of taste that started last night. Patient's wife recently diagnosed with covid and admitted to the hospital.  Patient alert and oriented x4. Pt. Placed in position of comfort with call bell in reach.

## 2022-02-09 ENCOUNTER — APPOINTMENT (OUTPATIENT)
Dept: GENERAL RADIOLOGY | Age: 82
DRG: 388 | End: 2022-02-09
Attending: INTERNAL MEDICINE
Payer: MEDICARE

## 2022-02-09 LAB
ANION GAP SERPL CALC-SCNC: 4 MMOL/L (ref 5–15)
BASOPHILS # BLD: 0 K/UL (ref 0–0.1)
BASOPHILS NFR BLD: 0 % (ref 0–1)
BUN SERPL-MCNC: 14 MG/DL (ref 6–20)
BUN/CREAT SERPL: 20 (ref 12–20)
CALCIUM SERPL-MCNC: 8.3 MG/DL (ref 8.5–10.1)
CHLORIDE SERPL-SCNC: 110 MMOL/L (ref 97–108)
CO2 SERPL-SCNC: 29 MMOL/L (ref 21–32)
CREAT SERPL-MCNC: 0.7 MG/DL (ref 0.7–1.3)
DIFFERENTIAL METHOD BLD: ABNORMAL
EOSINOPHIL # BLD: 0 K/UL (ref 0–0.4)
EOSINOPHIL NFR BLD: 0 % (ref 0–7)
ERYTHROCYTE [DISTWIDTH] IN BLOOD BY AUTOMATED COUNT: 13 % (ref 11.5–14.5)
GLUCOSE SERPL-MCNC: 102 MG/DL (ref 65–100)
HCT VFR BLD AUTO: 43.2 % (ref 36.6–50.3)
HGB BLD-MCNC: 14.1 G/DL (ref 12.1–17)
IMM GRANULOCYTES # BLD AUTO: 0 K/UL (ref 0–0.04)
IMM GRANULOCYTES NFR BLD AUTO: 0 % (ref 0–0.5)
LYMPHOCYTES # BLD: 1.5 K/UL (ref 0.8–3.5)
LYMPHOCYTES NFR BLD: 16 % (ref 12–49)
MCH RBC QN AUTO: 29.6 PG (ref 26–34)
MCHC RBC AUTO-ENTMCNC: 32.6 G/DL (ref 30–36.5)
MCV RBC AUTO: 90.8 FL (ref 80–99)
MONOCYTES # BLD: 0.7 K/UL (ref 0–1)
MONOCYTES NFR BLD: 7 % (ref 5–13)
NEUTS SEG # BLD: 7.3 K/UL (ref 1.8–8)
NEUTS SEG NFR BLD: 77 % (ref 32–75)
NRBC # BLD: 0 K/UL (ref 0–0.01)
NRBC BLD-RTO: 0 PER 100 WBC
PLATELET # BLD AUTO: 198 K/UL (ref 150–400)
PMV BLD AUTO: 10.4 FL (ref 8.9–12.9)
POTASSIUM SERPL-SCNC: 4 MMOL/L (ref 3.5–5.1)
RBC # BLD AUTO: 4.76 M/UL (ref 4.1–5.7)
SODIUM SERPL-SCNC: 143 MMOL/L (ref 136–145)
WBC # BLD AUTO: 9.6 K/UL (ref 4.1–11.1)

## 2022-02-09 PROCEDURE — 74011250636 HC RX REV CODE- 250/636: Performed by: INTERNAL MEDICINE

## 2022-02-09 PROCEDURE — 74018 RADEX ABDOMEN 1 VIEW: CPT

## 2022-02-09 PROCEDURE — 77010033678 HC OXYGEN DAILY

## 2022-02-09 PROCEDURE — 85025 COMPLETE CBC W/AUTO DIFF WBC: CPT

## 2022-02-09 PROCEDURE — 0D20XUZ CHANGE FEEDING DEVICE IN UPPER INTESTINAL TRACT, EXTERNAL APPROACH: ICD-10-PCS | Performed by: SURGERY

## 2022-02-09 PROCEDURE — 99232 SBSQ HOSP IP/OBS MODERATE 35: CPT | Performed by: SURGERY

## 2022-02-09 PROCEDURE — XW033E5 INTRODUCTION OF REMDESIVIR ANTI-INFECTIVE INTO PERIPHERAL VEIN, PERCUTANEOUS APPROACH, NEW TECHNOLOGY GROUP 5: ICD-10-PCS | Performed by: INTERNAL MEDICINE

## 2022-02-09 PROCEDURE — 36415 COLL VENOUS BLD VENIPUNCTURE: CPT

## 2022-02-09 PROCEDURE — 74011000258 HC RX REV CODE- 258: Performed by: INTERNAL MEDICINE

## 2022-02-09 PROCEDURE — 65270000029 HC RM PRIVATE

## 2022-02-09 PROCEDURE — 74011250636 HC RX REV CODE- 250/636: Performed by: SURGERY

## 2022-02-09 PROCEDURE — 80048 BASIC METABOLIC PNL TOTAL CA: CPT

## 2022-02-09 RX ORDER — DEXAMETHASONE SODIUM PHOSPHATE 4 MG/ML
6 INJECTION, SOLUTION INTRA-ARTICULAR; INTRALESIONAL; INTRAMUSCULAR; INTRAVENOUS; SOFT TISSUE EVERY 24 HOURS
Status: DISCONTINUED | OUTPATIENT
Start: 2022-02-09 | End: 2022-02-11

## 2022-02-09 RX ORDER — ENOXAPARIN SODIUM 100 MG/ML
40 INJECTION SUBCUTANEOUS EVERY 24 HOURS
Status: DISCONTINUED | OUTPATIENT
Start: 2022-02-09 | End: 2022-02-11 | Stop reason: HOSPADM

## 2022-02-09 RX ORDER — HYDRALAZINE HYDROCHLORIDE 20 MG/ML
10 INJECTION INTRAMUSCULAR; INTRAVENOUS
Status: DISCONTINUED | OUTPATIENT
Start: 2022-02-09 | End: 2022-02-11 | Stop reason: HOSPADM

## 2022-02-09 RX ORDER — ALBUTEROL SULFATE 90 UG/1
1 AEROSOL, METERED RESPIRATORY (INHALATION)
Status: DISCONTINUED | OUTPATIENT
Start: 2022-02-09 | End: 2022-02-11 | Stop reason: HOSPADM

## 2022-02-09 RX ADMIN — DEXAMETHASONE SODIUM PHOSPHATE 6 MG: 4 INJECTION, SOLUTION INTRAMUSCULAR; INTRAVENOUS at 12:27

## 2022-02-09 RX ADMIN — SODIUM CHLORIDE 125 ML/HR: 9 INJECTION, SOLUTION INTRAVENOUS at 06:53

## 2022-02-09 RX ADMIN — METRONIDAZOLE 500 MG: 500 INJECTION, SOLUTION INTRAVENOUS at 05:23

## 2022-02-09 RX ADMIN — HYDROMORPHONE HYDROCHLORIDE 0.5 MG: 1 INJECTION, SOLUTION INTRAMUSCULAR; INTRAVENOUS; SUBCUTANEOUS at 22:01

## 2022-02-09 RX ADMIN — HYDROMORPHONE HYDROCHLORIDE 0.5 MG: 1 INJECTION, SOLUTION INTRAMUSCULAR; INTRAVENOUS; SUBCUTANEOUS at 03:09

## 2022-02-09 RX ADMIN — METRONIDAZOLE 500 MG: 500 INJECTION, SOLUTION INTRAVENOUS at 18:03

## 2022-02-09 RX ADMIN — REMDESIVIR 200 MG: 100 INJECTION, POWDER, LYOPHILIZED, FOR SOLUTION INTRAVENOUS at 12:32

## 2022-02-09 RX ADMIN — ENOXAPARIN SODIUM 40 MG: 100 INJECTION SUBCUTANEOUS at 12:27

## 2022-02-09 RX ADMIN — LEVOFLOXACIN 750 MG: 5 INJECTION, SOLUTION INTRAVENOUS at 18:03

## 2022-02-09 NOTE — PROGRESS NOTES
Transition of Care Plan:    RUR: 7% low risk  Disposition: Home with family assistance  Follow up appointments: PCP  DME needed: Pt has a walker and cane  Transportation at Discharge: Pt's significant other  Keys or means to access home:      Pt's SO has access  IM Medicare Letter: To be given prior to d/c  Is patient a BCPI-A Bundle:  No   If yes, was Bundle Letter given?:    Is patient a  and connected with the [place]? No            If yes, was [employer] transfer form completed and [facility] notified? Caregiver Contact: Life Josr rod ([phone])  Discharge Caregiver contacted prior to discharge? Pt will contact cg prior to d/c  Care Conference needed?:  N/A    Reason for Admission:  Small Bowel Obstruction                     RUR Score:       7%              Plan for utilizing home health:      No needs identified    PCP: First and Last name:  Dr. Destini Plunkett     [facility] of [facility]   Are you a current patient: Yes/No: Yes   Approximate date of last visit: 1/31/2022   Can you participate in a virtual visit with your PCP: No                    Current Advanced Directive/Advance Care Plan: Full Code      Healthcare Decision Maker: Life Josr rod ([phone])                 Transition of Care Plan:       Home with family assistance             CM contacted pt via room phone to complete initial assessment. CM introduced role, verified demographics, and discussed discharge planning. Pt is an [de-identified] yo male with an admitting diagnosis of SBO. Pt uses [facility] on [address]. Pt is independent at baseline and drives. Pt has a cane and walker. Pt has fx of SNF from over 20 yrs ago. Pt's significant other will transport him home at d/c. Care Management Interventions  PCP Verified by CM: Yes (Dr. Destini Plunkett)  Mode of Transport at Discharge:  Other (see comment) (Pt's significant other)  Transition of Care Consult (CM Consult): Discharge Planning  Discharge Durable Medical Equipment: No (Pt has a walker and cane)  Physical Therapy Consult: No  Occupational Therapy Consult: No  Speech Therapy Consult: No  Support Systems: Spouse/Significant Other,Child(chapito)  Confirm Follow Up Transport: Self  Discharge Location  Patient Expects to be Discharged to[de-identified] Home with family assistance    LINUS King  Care Manager [facility]  [phone]

## 2022-02-09 NOTE — CONSULTS
Hospitalist Consultation Note    NAME:  Caren Pena   :      MRN:   [id number]     ATTENDING: Becca John MD  PCP:  None    Date/Time:  2022 8:58 AM      Recommendations/Plan:     Acute hypoxic respiratory failure due to COVID19 infection, likely early PNA   Vaccinated with Pfizer x3  CXR neg for acute process. No leukocytosis. Currently requiring 2.5LNC. Will check procal, ddimer and crp level   Unfortunately given his SBO status, he is at risk for bowel perforation so will avoid actemra and baricitinib  Start IV decadron, add dvt prophylaxis with lovenox  Consult pharmacy to see if pt is a candidate for remdesivir  Add albuterol inh  Pt is already on levaquin and flagyl for possible intraabdominal infection  Supportive care. Daily CMP  O2 suppl, wean as tolerated  Discussed with primary team, Dr Kiana Valles    SBO  Management as per primary team.   Currently NPO, on IVF, monitor vol status. HLD  HTN  Hold home statin while NPO  Will add prn IV hydralazine      Code Status: Full   DVT Prophylaxis: lovenox          Subjective:   REQUESTING PHYSICIAN: Dr Zora Perez: management of covid 19 infection  Haleigh Olivo is a [de-identified] y.o.  male who I was asked to see for  management of COVID19 infection. Pt is admitted under surgery team for SBO. Currently has and NGT and is NPO status. He was found to be covid positive on admission. Pt currently c/o mild SOB and is requiring up to 2.5LNC. He does not use home O2. He is vaccinated with [employer] x3. CXR from  showed R hemidiaphragm elevation. No acute process seen. Vitals/labs/imaging reviewed.     Past Medical History:   Diagnosis Date    CAD (coronary artery disease)     3 stents    Diverticulosis     Hammer toe 2019    Hammertoe of right foot     Hypertension     Metatarsalgia, right foot 2019    Sinus bradycardia       Past Surgical History:   Procedure Laterality Date    HX GI  age 29    colon procedure    HX HEART CATHETERIZATION  prior to 2005    x3 stents    HX ORTHOPAEDIC Right     foot    HX TONSILLECTOMY       Social History     Tobacco Use    Smoking status: Former Smoker    Smokeless tobacco: Never Used   Substance Use Topics    Alcohol use: Not Currently      History reviewed. No pertinent family history. No Known Allergies   Prior to Admission medications    Medication Sig Start Date End Date Taking? Authorizing Provider   losartan (COZAAR) 25 mg tablet Take 25 mg by mouth daily. Yes Provider, Historical   acetaminophen (TylenoL) 325 mg tablet Take 650 mg by mouth every four (4) hours as needed for Pain. Yes Provider, Historical   vit A/vit C/vit E/zinc/copper (PRESERVISION AREDS PO) Take 1 Tablet by mouth daily. Yes Provider, Historical   multivitamin (ONE A DAY) tablet Take 1 Tab by mouth daily. Yes Other, MD Nola   omega-3 fatty acids-vitamin e (FISH OIL) 1,000 mg Cap Take 1 Cap by mouth daily. Yes Jose De Jesus, MD Nola       REVIEW OF SYSTEMS:     Total of 12 systems reviewed as follows:   I am not able to complete the review of systems because:    The patient is intubated and sedated    The patient has altered mental status due to his acute medical problems    The patient has baseline aphasia from prior stroke(s)    The patient has baseline dementia and is not reliable historian                 POSITIVE= underlined text  Negative = text not underlined  General:  fever, chills, sweats, generalized weakness, weight loss/gain,      loss of appetite   Eyes:    blurred vision, eye pain, loss of vision, double vision  ENT:    rhinorrhea, pharyngitis   Respiratory:   cough, sputum production, SOB, wheezing, R[place], pleuritic pain   Cardiology:   chest pain, palpitations, orthopnea, PND, edema, syncope   Gastrointestinal:  abdominal pain , N/V, dysphagia, diarrhea, constipation, bleeding   Genitourinary:  frequency, urgency, dysuria, hematuria, incontinence   Muskuloskeletal : arthralgia, myalgia   Hematology:  easy bruising, nose or gum bleeding, lymphadenopathy   Dermatological: rash, ulceration, pruritis   Endocrine:   hot flashes or polydipsia   Neurological:  headache, dizziness, confusion, focal weakness, paresthesia,     Speech difficulties, memory loss, gait disturbance  Psychological: Feelings of anxiety, depression, agitation    Objective:   VITALS:    Visit Vitals  /78   Pulse 82   Temp 98.2 °F (36.8 °C)   Resp 18   Ht 5' 6\" (1.676 m)   Wt 83.9 kg (185 lb)   SpO2 98%   BMI 29.86 kg/m²     Temp (24hrs), Av.1 °F (36.7 °C), Min:98 °F (36.7 °C), Max:98.2 °F (36.8 °C)      PHYSICAL EXAM:   General:    Resting in bed, NAD. HEENT: Atraumatic, anicteric sclerae  Neck:  Supple, symmetrical,  thyroid: non tender  Lungs:   No accessory muscle use  Heart:   Regular  rhythm,  No  murmur    Extremities: No cyanosis. No clubbing  Skin:     Not pale. Not Jaundiced  No rashes   Psych:  Good insight. Not depressed. Not anxious or agitated. Neurologic: EOMs intact. No facial asymmetry. No aphasia or slurred speech. Symmetrical strength, Alert and oriented X 4.     _______________________________________________________________________  Care Plan discussed with:    Comments   Patient x    Family      RN x    Care Manager                    Consultant:  [email or web address]    ____________________________________________________________________  TOTAL TIME65 mins    Comments    x Reviewed previous records   >50% of visit spent in counseling and coordination of care x Discussion with patient and/or family and questions answered       Critical Care Provided     Minutes non procedure based  ________________________________________________________________________  Signed: Rose Ray MD      Procedures: see electronic medical records for all procedures/Xrays and details which were not copied into this note but were reviewed prior to creation of Plan.     LAB DATA REVIEWED:    Recent Results (from the past 24 hour(s))   CBC WITH AUTOMATED DIFF    Collection Time: 02/08/22 12:28 PM   Result Value Ref Range    WBC 14.7 (H) 4.1 - 11.1 K/uL    RBC 5.55 4.10 - 5.70 M/uL    HGB 16.6 12.1 - 17.0 g/dL    HCT 49.1 36.6 - 50.3 %    MCV 88.5 80.0 - 99.0 FL    MCH 29.9 26.0 - 34.0 PG    MCHC 33.8 30.0 - 36.5 g/dL    RDW 13.2 11.5 - 14.5 %    PLATELET 174 913 - 623 K/uL    MPV 10.2 8.9 - 12.9 FL    NRBC 0.0 0  WBC    ABSOLUTE NRBC 0.00 0.00 - 0.01 K/uL    NEUTROPHILS 87 (H) 32 - 75 %    LYMPHOCYTES 8 (L) 12 - 49 %    MONOCYTES 5 5 - 13 %    EOSINOPHILS 0 0 - 7 %    BASOPHILS 0 0 - 1 %    IMMATURE GRANULOCYTES 0 0.0 - 0.5 %    ABS. NEUTROPHILS 12.6 (H) 1.8 - 8.0 K/UL    ABS. LYMPHOCYTES 1.2 0.8 - 3.5 K/UL    ABS. MONOCYTES 0.7 0.0 - 1.0 K/UL    ABS. EOSINOPHILS 0.1 0.0 - 0.4 K/UL    ABS. BASOPHILS 0.1 0.0 - 0.1 K/UL    ABS. IMM. GRANS. 0.1 (H) 0.00 - 0.04 K/UL    DF AUTOMATED     METABOLIC PANEL, COMPREHENSIVE    Collection Time: 02/08/22 12:28 PM   Result Value Ref Range    Sodium 137 136 - 145 mmol/L    Potassium 4.3 3.5 - 5.1 mmol/L    Chloride 103 97 - 108 mmol/L    CO2 28 21 - 32 mmol/L    Anion gap 6 5 - 15 mmol/L    Glucose 130 (H) 65 - 100 mg/dL    BUN 17 6 - 20 MG/DL    Creatinine 0.95 0.70 - 1.30 MG/DL    BUN/Creatinine ratio 18 12 - 20      GFR est AA >60 >60 ml/min/1.73m2    GFR est non-AA >60 >60 ml/min/1.73m2    Calcium 9.8 8.5 - 10.1 MG/DL    Bilirubin, total 0.6 0.2 - 1.0 MG/DL    ALT (SGPT) 27 12 - 78 U/L    AST (SGOT) 16 15 - 37 U/L    Alk.  phosphatase 124 (H) 45 - 117 U/L    Protein, total 8.4 (H) 6.4 - 8.2 g/dL    Albumin 4.0 3.5 - 5.0 g/dL    Globulin 4.4 (H) 2.0 - 4.0 g/dL    A-G Ratio 0.9 (L) 1.1 - 2.2     COVID-19 RAPID TEST    Collection Time: 02/08/22 12:28 PM   Result Value Ref Range    Specimen source Nasopharyngeal      COVID-19 rapid test Detected (A) NOTD     LIPASE    Collection Time: 02/08/22 12:28 PM   Result Value Ref Range    Lipase 109 73 - 393 U/L   EKG, 12 LEAD, INITIAL    Collection Time: 02/08/22  2:08 PM   Result Value Ref Range    Ventricular Rate 81 BPM    Atrial Rate 81 BPM    P-R Interval 172 ms    QRS Duration 98 ms    Q-T Interval 370 ms    QTC Calculation (Bezet) 429 ms    Calculated P Axis 52 degrees    Calculated R Axis -35 degrees    Calculated T Axis 85 degrees    Diagnosis       Sinus rhythm with sinus arrhythmia with occasional premature ventricular   complexes  Left axis deviation    Confirmed by Gilles Morgan MD, Josh Swartz ([id number]) on 2/8/2022 5:99:65 PM     METABOLIC PANEL, BASIC    Collection Time: 02/09/22  2:16 AM   Result Value Ref Range    Sodium 143 136 - 145 mmol/L    Potassium 4.0 3.5 - 5.1 mmol/L    Chloride 110 (H) 97 - 108 mmol/L    CO2 29 21 - 32 mmol/L    Anion gap 4 (L) 5 - 15 mmol/L    Glucose 102 (H) 65 - 100 mg/dL    BUN 14 6 - 20 MG/DL    Creatinine 0.70 0.70 - 1.30 MG/DL    BUN/Creatinine ratio 20 12 - 20      GFR est AA >60 >60 ml/min/1.73m2    GFR est non-AA >60 >60 ml/min/1.73m2    Calcium 8.3 (L) 8.5 - 10.1 MG/DL   CBC WITH AUTOMATED DIFF    Collection Time: 02/09/22  2:16 AM   Result Value Ref Range    WBC 9.6 4.1 - 11.1 K/uL    RBC 4.76 4.10 - 5.70 M/uL    HGB 14.1 12.1 - 17.0 g/dL    HCT 43.2 36.6 - 50.3 %    MCV 90.8 80.0 - 99.0 FL    MCH 29.6 26.0 - 34.0 PG    MCHC 32.6 30.0 - 36.5 g/dL    RDW 13.0 11.5 - 14.5 %    PLATELET 891 926 - 849 K/uL    MPV 10.4 8.9 - 12.9 FL    NRBC 0.0 0  WBC    ABSOLUTE NRBC 0.00 0.00 - 0.01 K/uL    NEUTROPHILS 77 (H) 32 - 75 %    LYMPHOCYTES 16 12 - 49 %    MONOCYTES 7 5 - 13 %    EOSINOPHILS 0 0 - 7 %    BASOPHILS 0 0 - 1 %    IMMATURE GRANULOCYTES 0 0.0 - 0.5 %    ABS. NEUTROPHILS 7.3 1.8 - 8.0 K/UL    ABS. LYMPHOCYTES 1.5 0.8 - 3.5 K/UL    ABS. MONOCYTES 0.7 0.0 - 1.0 K/UL    ABS. EOSINOPHILS 0.0 0.0 - 0.4 K/UL    ABS. BASOPHILS 0.0 0.0 - 0.1 K/UL    ABS. IMM.  GRANS. 0.0 0.00 - 0.04 K/UL    DF AUTOMATED         _____________________________  Hospitalist: Homar Walden MD

## 2022-02-09 NOTE — PROGRESS NOTES
Admit Date: 2022    Subjective:     Patient has no new complaints. Feels much better. No abdominal pain. No N/V. No BM yet. Objective:     Blood pressure 124/78, pulse 82, temperature 98.2 °F (36.8 °C), resp. rate 18, height 5' 6\" (1.676 m), weight 185 lb (83.9 kg), SpO2 98 %. Temp (24hrs), Av.1 °F (36.7 °C), Min:98 °F (36.7 °C), Max:98.2 °F (36.8 °C)      Physical Exam:  GENERAL: alert, cooperative, no distress, appears stated age, LUNG: clear to auscultation bilaterally, HEART: regular rate and rhythm, ABDOMEN: soft, NT, ND, EXTREMITIES:  extremities normal, atraumatic, no cyanosis or edema    Labs:   Recent Results (from the past 24 hour(s))   CBC WITH AUTOMATED DIFF    Collection Time: 22 12:28 PM   Result Value Ref Range    WBC 14.7 (H) 4.1 - 11.1 K/uL    RBC 5.55 4.10 - 5.70 M/uL    HGB 16.6 12.1 - 17.0 g/dL    HCT 49.1 36.6 - 50.3 %    MCV 88.5 80.0 - 99.0 FL    MCH 29.9 26.0 - 34.0 PG    MCHC 33.8 30.0 - 36.5 g/dL    RDW 13.2 11.5 - 14.5 %    PLATELET 024 047 - 200 K/uL    MPV 10.2 8.9 - 12.9 FL    NRBC 0.0 0  WBC    ABSOLUTE NRBC 0.00 0.00 - 0.01 K/uL    NEUTROPHILS 87 (H) 32 - 75 %    LYMPHOCYTES 8 (L) 12 - 49 %    MONOCYTES 5 5 - 13 %    EOSINOPHILS 0 0 - 7 %    BASOPHILS 0 0 - 1 %    IMMATURE GRANULOCYTES 0 0.0 - 0.5 %    ABS. NEUTROPHILS 12.6 (H) 1.8 - 8.0 K/UL    ABS. LYMPHOCYTES 1.2 0.8 - 3.5 K/UL    ABS. MONOCYTES 0.7 0.0 - 1.0 K/UL    ABS. EOSINOPHILS 0.1 0.0 - 0.4 K/UL    ABS. BASOPHILS 0.1 0.0 - 0.1 K/UL    ABS. IMM.  GRANS. 0.1 (H) 0.00 - 0.04 K/UL    DF AUTOMATED     METABOLIC PANEL, COMPREHENSIVE    Collection Time: 22 12:28 PM   Result Value Ref Range    Sodium 137 136 - 145 mmol/L    Potassium 4.3 3.5 - 5.1 mmol/L    Chloride 103 97 - 108 mmol/L    CO2 28 21 - 32 mmol/L    Anion gap 6 5 - 15 mmol/L    Glucose 130 (H) 65 - 100 mg/dL    BUN 17 6 - 20 MG/DL    Creatinine 0.95 0.70 - 1.30 MG/DL    BUN/Creatinine ratio 18 12 - 20      GFR est AA >60 >60 ml/min/1.73m2    GFR est non-AA >60 >60 ml/min/1.73m2    Calcium 9.8 8.5 - 10.1 MG/DL    Bilirubin, total 0.6 0.2 - 1.0 MG/DL    ALT (SGPT) 27 12 - 78 U/L    AST (SGOT) 16 15 - 37 U/L    Alk.  phosphatase 124 (H) 45 - 117 U/L    Protein, total 8.4 (H) 6.4 - 8.2 g/dL    Albumin 4.0 3.5 - 5.0 g/dL    Globulin 4.4 (H) 2.0 - 4.0 g/dL    A-G Ratio 0.9 (L) 1.1 - 2.2     COVID-19 RAPID TEST    Collection Time: 02/08/22 12:28 PM   Result Value Ref Range    Specimen source Nasopharyngeal      COVID-19 rapid test Detected (A) NOTD     LIPASE    Collection Time: 02/08/22 12:28 PM   Result Value Ref Range    Lipase 109 73 - 393 U/L   EKG, 12 LEAD, INITIAL    Collection Time: 02/08/22  2:08 PM   Result Value Ref Range    Ventricular Rate 81 BPM    Atrial Rate 81 BPM    P-R Interval 172 ms    QRS Duration 98 ms    Q-T Interval 370 ms    QTC Calculation (Bezet) 429 ms    Calculated P Axis 52 degrees    Calculated R Axis -35 degrees    Calculated T Axis 85 degrees    Diagnosis       Sinus rhythm with sinus arrhythmia with occasional premature ventricular   complexes  Left axis deviation    Confirmed by Katie Pretty MD, Teresa Govea ([id number]) on 2/8/2022 4:37:92 PM     METABOLIC PANEL, BASIC    Collection Time: 02/09/22  2:16 AM   Result Value Ref Range    Sodium 143 136 - 145 mmol/L    Potassium 4.0 3.5 - 5.1 mmol/L    Chloride 110 (H) 97 - 108 mmol/L    CO2 29 21 - 32 mmol/L    Anion gap 4 (L) 5 - 15 mmol/L    Glucose 102 (H) 65 - 100 mg/dL    BUN 14 6 - 20 MG/DL    Creatinine 0.70 0.70 - 1.30 MG/DL    BUN/Creatinine ratio 20 12 - 20      GFR est AA >60 >60 ml/min/1.73m2    GFR est non-AA >60 >60 ml/min/1.73m2    Calcium 8.3 (L) 8.5 - 10.1 MG/DL   CBC WITH AUTOMATED DIFF    Collection Time: 02/09/22  2:16 AM   Result Value Ref Range    WBC 9.6 4.1 - 11.1 K/uL    RBC 4.76 4.10 - 5.70 M/uL    HGB 14.1 12.1 - 17.0 g/dL    HCT 43.2 36.6 - 50.3 %    MCV 90.8 80.0 - 99.0 FL    MCH 29.6 26.0 - 34.0 PG    MCHC 32.6 30.0 - 36.5 g/dL    RDW 13.0 11.5 - 14.5 %    PLATELET 097 991 - 269 K/uL    MPV 10.4 8.9 - 12.9 FL    NRBC 0.0 0  WBC    ABSOLUTE NRBC 0.00 0.00 - 0.01 K/uL    NEUTROPHILS 77 (H) 32 - 75 %    LYMPHOCYTES 16 12 - 49 %    MONOCYTES 7 5 - 13 %    EOSINOPHILS 0 0 - 7 %    BASOPHILS 0 0 - 1 %    IMMATURE GRANULOCYTES 0 0.0 - 0.5 %    ABS. NEUTROPHILS 7.3 1.8 - 8.0 K/UL    ABS. LYMPHOCYTES 1.5 0.8 - 3.5 K/UL    ABS. MONOCYTES 0.7 0.0 - 1.0 K/UL    ABS. EOSINOPHILS 0.0 0.0 - 0.4 K/UL    ABS. BASOPHILS 0.0 0.0 - 0.1 K/UL    ABS. IMM. GRANS. 0.0 0.00 - 0.04 K/UL    DF AUTOMATED         Data Review images and reports reviewed    Assessment:     Principal Problem:    Small bowel obstruction ([address] (2/8/2022)    Active Problems:    COVID-19 (2/8/2022)        Plan/Recommendations/Medical Decision Making:     Continue present treatment   Hospitalist consult requested on admission in ED yesterday midday - not yet assigned to a provider. Pt with COVID+  NGT sitting at 27 cm on rounds this morning. No outputs recorded since admission.   Replace NGT  Strict I/Os  Abdominal films pending  Hospitalist consult  WBC normal today  Continue levaquin/flagyl for now    Vicky Zapata MD  ED [facility]

## 2022-02-10 ENCOUNTER — APPOINTMENT (OUTPATIENT)
Dept: GENERAL RADIOLOGY | Age: 82
DRG: 388 | End: 2022-02-10
Attending: SURGERY
Payer: MEDICARE

## 2022-02-10 LAB
GLUCOSE BLD STRIP.AUTO-MCNC: 137 MG/DL (ref 65–117)
GLUCOSE BLD STRIP.AUTO-MCNC: 88 MG/DL (ref 65–117)
SERVICE CMNT-IMP: ABNORMAL
SERVICE CMNT-IMP: NORMAL

## 2022-02-10 PROCEDURE — 65270000029 HC RM PRIVATE

## 2022-02-10 PROCEDURE — 77030019563 HC DEV ATTCH FEED HOLL -A

## 2022-02-10 PROCEDURE — 74011000258 HC RX REV CODE- 258: Performed by: INTERNAL MEDICINE

## 2022-02-10 PROCEDURE — 87177 OVA AND PARASITES SMEARS: CPT

## 2022-02-10 PROCEDURE — 87506 IADNA-DNA/RNA PROBE TQ 6-11: CPT

## 2022-02-10 PROCEDURE — 74011250636 HC RX REV CODE- 250/636: Performed by: INTERNAL MEDICINE

## 2022-02-10 PROCEDURE — 74011000250 HC RX REV CODE- 250: Performed by: NURSE PRACTITIONER

## 2022-02-10 PROCEDURE — 74011000636 HC RX REV CODE- 636: Performed by: NURSE PRACTITIONER

## 2022-02-10 PROCEDURE — 2709999900 HC NON-CHARGEABLE SUPPLY

## 2022-02-10 PROCEDURE — 89055 LEUKOCYTE ASSESSMENT FECAL: CPT

## 2022-02-10 PROCEDURE — 74018 RADEX ABDOMEN 1 VIEW: CPT

## 2022-02-10 PROCEDURE — 74011250636 HC RX REV CODE- 250/636: Performed by: SURGERY

## 2022-02-10 PROCEDURE — 74011250636 HC RX REV CODE- 250/636: Performed by: NURSE PRACTITIONER

## 2022-02-10 PROCEDURE — 99232 SBSQ HOSP IP/OBS MODERATE 35: CPT | Performed by: SURGERY

## 2022-02-10 PROCEDURE — C9113 INJ PANTOPRAZOLE SODIUM, VIA: HCPCS | Performed by: NURSE PRACTITIONER

## 2022-02-10 PROCEDURE — 82962 GLUCOSE BLOOD TEST: CPT

## 2022-02-10 RX ADMIN — SODIUM CHLORIDE, PRESERVATIVE FREE 40 MG: 5 INJECTION INTRAVENOUS at 11:39

## 2022-02-10 RX ADMIN — METRONIDAZOLE 500 MG: 500 INJECTION, SOLUTION INTRAVENOUS at 04:30

## 2022-02-10 RX ADMIN — REMDESIVIR 100 MG: 100 INJECTION, POWDER, LYOPHILIZED, FOR SOLUTION INTRAVENOUS at 11:39

## 2022-02-10 RX ADMIN — LEVOFLOXACIN 750 MG: 5 INJECTION, SOLUTION INTRAVENOUS at 17:51

## 2022-02-10 RX ADMIN — HYDROMORPHONE HYDROCHLORIDE 0.5 MG: 1 INJECTION, SOLUTION INTRAMUSCULAR; INTRAVENOUS; SUBCUTANEOUS at 23:17

## 2022-02-10 RX ADMIN — ENOXAPARIN SODIUM 40 MG: 100 INJECTION SUBCUTANEOUS at 09:42

## 2022-02-10 RX ADMIN — DEXAMETHASONE SODIUM PHOSPHATE 6 MG: 4 INJECTION, SOLUTION INTRAMUSCULAR; INTRAVENOUS at 09:42

## 2022-02-10 RX ADMIN — DIATRIZOATE MEGLUMINE AND DIATRIZOATE SODIUM 80 ML: 600; 100 SOLUTION ORAL; RECTAL at 11:46

## 2022-02-10 RX ADMIN — METRONIDAZOLE 500 MG: 500 INJECTION, SOLUTION INTRAVENOUS at 17:40

## 2022-02-10 RX ADMIN — SODIUM CHLORIDE, PRESERVATIVE FREE 40 MG: 5 INJECTION INTRAVENOUS at 23:18

## 2022-02-10 NOTE — PROGRESS NOTES
Admit Date: 2022      POD * No surgery found *  * No surgery found *      Procedure:  * No surgery found *        HOSPITAL DAY:     ANTIBIOTICS: Levaquin and Flagyl for pulmonary issues    HPI:  Patient with large volume NG tube output, but it appears to be brown somewhat blood tinged, patient reports not being on any anticoagulants, he denies any flatus or bowel movement and says his abdominal pain has totally resolved. Patient sounds like he is very active normally given his age with no cardiac or pulmonary history. Abdominal x-rays yesterday showed no acute obstructive pattern NG tube has been advanced. He denies any history of peptic ulcer disease. Intestinal obstruction no previous abdominal surgery that I could tell or see evidence of based on scars he describes some type of rectal tear but does not appear to have any scars relating to any abdominal surgery. He states his last colonoscopy was a few years ago and was unremarkable. Patient states he lives with his life . ,  And it is okay to talk to her   Temp:  [97.7 °F (36.5 °C)-98.2 °F (36.8 °C)]   Pulse (Heart Rate):  [55-96]   BP: (123-164)/(63-91)   Resp Rate:  [16-18]   O2 Sat (%):  [86 %-100 %]   Weight:  [83.9 kg (185 lb)]       Intake and Output:  Current Shift: 02/10 0701 - 02/10 1900  In: -   Out: 300 [Urine:300]  Last three shifts: 1901 - 02/10 07  In: -   Out: 4000 [Urine:400]     Blood pressure (!) 164/73, pulse 61, temperature 98.1 °F (36.7 °C), resp. rate 18, height 5' 6\" (1.676 m), weight 83.9 kg (185 lb), SpO2 96 %. Temp (24hrs), Av °F (36.7 °C), Min:97.7 °F (36.5 °C), Max:98.1 °F (36.7 °C)        Review of Systems   Gastrointestinal: Negative for abdominal pain, nausea and vomiting. All other systems reviewed and are negative. Physical Exam  Vitals and nursing note reviewed. Exam conducted with a chaperone present (DONN Rousseau). Constitutional:       General: He is not in acute distress. Appearance: Normal appearance. He is not ill-appearing. Cardiovascular:      Rate and Rhythm: Normal rate and regular rhythm. Pulmonary:      Effort: Pulmonary effort is normal.   Abdominal:      General: Abdomen is flat. There is no distension. Palpations: Abdomen is soft. Tenderness: There is no abdominal tenderness. There is no guarding or rebound. Neurological:      Mental Status: He is alert. Recent Results (from the past 48 hour(s))   CBC WITH AUTOMATED DIFF    Collection Time: 02/08/22 12:28 PM   Result Value Ref Range    WBC 14.7 (H) 4.1 - 11.1 K/uL    RBC 5.55 4.10 - 5.70 M/uL    HGB 16.6 12.1 - 17.0 g/dL    HCT 49.1 36.6 - 50.3 %    MCV 88.5 80.0 - 99.0 FL    MCH 29.9 26.0 - 34.0 PG    MCHC 33.8 30.0 - 36.5 g/dL    RDW 13.2 11.5 - 14.5 %    PLATELET 833 313 - 761 K/uL    MPV 10.2 8.9 - 12.9 FL    NRBC 0.0 0  WBC    ABSOLUTE NRBC 0.00 0.00 - 0.01 K/uL    NEUTROPHILS 87 (H) 32 - 75 %    LYMPHOCYTES 8 (L) 12 - 49 %    MONOCYTES 5 5 - 13 %    EOSINOPHILS 0 0 - 7 %    BASOPHILS 0 0 - 1 %    IMMATURE GRANULOCYTES 0 0.0 - 0.5 %    ABS. NEUTROPHILS 12.6 (H) 1.8 - 8.0 K/UL    ABS. LYMPHOCYTES 1.2 0.8 - 3.5 K/UL    ABS. MONOCYTES 0.7 0.0 - 1.0 K/UL    ABS. EOSINOPHILS 0.1 0.0 - 0.4 K/UL    ABS. BASOPHILS 0.1 0.0 - 0.1 K/UL    ABS. IMM. GRANS. 0.1 (H) 0.00 - 0.04 K/UL    DF AUTOMATED     METABOLIC PANEL, COMPREHENSIVE    Collection Time: 02/08/22 12:28 PM   Result Value Ref Range    Sodium 137 136 - 145 mmol/L    Potassium 4.3 3.5 - 5.1 mmol/L    Chloride 103 97 - 108 mmol/L    CO2 28 21 - 32 mmol/L    Anion gap 6 5 - 15 mmol/L    Glucose 130 (H) 65 - 100 mg/dL    BUN 17 6 - 20 MG/DL    Creatinine 0.95 0.70 - 1.30 MG/DL    BUN/Creatinine ratio 18 12 - 20      GFR est AA >60 >60 ml/min/1.73m2    GFR est non-AA >60 >60 ml/min/1.73m2    Calcium 9.8 8.5 - 10.1 MG/DL    Bilirubin, total 0.6 0.2 - 1.0 MG/DL    ALT (SGPT) 27 12 - 78 U/L    AST (SGOT) 16 15 - 37 U/L    Alk.  phosphatase 124 (H) 45 - 117 U/L    Protein, total 8.4 (H) 6.4 - 8.2 g/dL    Albumin 4.0 3.5 - 5.0 g/dL    Globulin 4.4 (H) 2.0 - 4.0 g/dL    A-G Ratio 0.9 (L) 1.1 - 2.2     COVID-19 RAPID TEST    Collection Time: 02/08/22 12:28 PM   Result Value Ref Range    Specimen source Nasopharyngeal      COVID-19 rapid test Detected (A) NOTD     LIPASE    Collection Time: 02/08/22 12:28 PM   Result Value Ref Range    Lipase 109 73 - 393 U/L   EKG, 12 LEAD, INITIAL    Collection Time: 02/08/22  2:08 PM   Result Value Ref Range    Ventricular Rate 81 BPM    Atrial Rate 81 BPM    P-R Interval 172 ms    QRS Duration 98 ms    Q-T Interval 370 ms    QTC Calculation (Bezet) 429 ms    Calculated P Axis 52 degrees    Calculated R Axis -35 degrees    Calculated T Axis 85 degrees    Diagnosis       Sinus rhythm with sinus arrhythmia with occasional premature ventricular   complexes  Left axis deviation    Confirmed by Uvaldo Brito MD, April Lazcano ([id number]) on 2/8/2022 4:47:91 PM     METABOLIC PANEL, BASIC    Collection Time: 02/09/22  2:16 AM   Result Value Ref Range    Sodium 143 136 - 145 mmol/L    Potassium 4.0 3.5 - 5.1 mmol/L    Chloride 110 (H) 97 - 108 mmol/L    CO2 29 21 - 32 mmol/L    Anion gap 4 (L) 5 - 15 mmol/L    Glucose 102 (H) 65 - 100 mg/dL    BUN 14 6 - 20 MG/DL    Creatinine 0.70 0.70 - 1.30 MG/DL    BUN/Creatinine ratio 20 12 - 20      GFR est AA >60 >60 ml/min/1.73m2    GFR est non-AA >60 >60 ml/min/1.73m2    Calcium 8.3 (L) 8.5 - 10.1 MG/DL   CBC WITH AUTOMATED DIFF    Collection Time: 02/09/22  2:16 AM   Result Value Ref Range    WBC 9.6 4.1 - 11.1 K/uL    RBC 4.76 4.10 - 5.70 M/uL    HGB 14.1 12.1 - 17.0 g/dL    HCT 43.2 36.6 - 50.3 %    MCV 90.8 80.0 - 99.0 FL    MCH 29.6 26.0 - 34.0 PG    MCHC 32.6 30.0 - 36.5 g/dL    RDW 13.0 11.5 - 14.5 %    PLATELET 090 678 - 377 K/uL    MPV 10.4 8.9 - 12.9 FL    NRBC 0.0 0  WBC    ABSOLUTE NRBC 0.00 0.00 - 0.01 K/uL    NEUTROPHILS 77 (H) 32 - 75 %    LYMPHOCYTES 16 12 - 49 %    MONOCYTES 7 5 - 13 % EOSINOPHILS 0 0 - 7 %    BASOPHILS 0 0 - 1 %    IMMATURE GRANULOCYTES 0 0.0 - 0.5 %    ABS. NEUTROPHILS 7.3 1.8 - 8.0 K/UL    ABS. LYMPHOCYTES 1.5 0.8 - 3.5 K/UL    ABS. MONOCYTES 0.7 0.0 - 1.0 K/UL    ABS. EOSINOPHILS 0.0 0.0 - 0.4 K/UL    ABS. BASOPHILS 0.0 0.0 - 0.1 K/UL    ABS. IMM. GRANS. 0.0 0.00 - 0.04 K/UL    DF AUTOMATED     GLUCOSE, POC    Collection Time: 02/10/22  9:12 AM   Result Value Ref Range    Glucose (POC) 137 (H) 65 - 117 mg/dL    Performed by Luana Love (PCT)          XR Results (maximum last 3): Results from [place] encounter on 02/08/22    XR ABD (KUB)    Impression  Interval retraction of the NG tube, terminating over the proximal stomach with  sidehole projecting over the lower esophagus; recommend advancing 8-10 cm for  more optimal positioning. CT Results (maximum last 3): Results from [place] encounter on 02/08/22    CT ABD PELV W CONT    Impression  1. Small bowel obstruction. Transition point at the level of the right mid  abdominal ileum where there is a thickened segment measuring approximately 40 cm  in length. Segment of mildly thickened proximal jejunum. 2. Circumferential thickening of the distal esophagus. Recommend endoscopy for  further evaluation. 3. Increase in size of infrarenal fusiform abdominal aortic aneurysm, now  measuring 3 cm in AP dimension. MRI Results (maximum last 3): Results from [place] encounter on 03/20/09    MRI LUMBAR SPINE W AND W/O CONT      Nuclear Medicine Results (maximum last 3): No results found for this or any previous visit. US Results (maximum last 3):   Results from [place] encounter on 07/19/12    US ABD COMP            Principal Problem:    Small bowel obstruction ([address] (2/8/2022)    Active Problems:    COVID-19 (2/8/2022)          ASSESSMENT/PLAN  Intestinal obstruction no previous abdominal surgery that I could tell or see evidence of based on scars he describes some type of rectal tear but does not appear to have any scars relating to any abdominal surgery. He states his last colonoscopy was a few years ago and was unremarkable. Patient states he lives with his life . ,  And it is okay to talk to her, I did call her to review and got voicemail and voicemail messages were full. Patient is consuming moderate amount of liquids around his NG tube, we will clamp NG tube and give Gastrografin challenge and follow-up abdominal x-rays to see if this moves through his colon and if this does not improve or seems to worsen the may need to consider exploratory laparoscopy/laparotomy to assess for source of small bowel obstruction patient is aware the benefits risks alternatives recovery morbidity issues. We will also add proton pump inhibitor given possible gastritis as there is some bloody NG tube output, this could be traumatic also, I have asked patient to minimize any p.o. intake.       FACE TO FACE time including review of any indicated imaging, discussion with patient, and other providers, exam and discussion with patient:   26       minutes    END:

## 2022-02-10 NOTE — PROGRESS NOTES
Hospitalist Progress Note    NAME: Saleem Ponce   :  1940   MRN:  [id number]       Assessment / Plan:  COVID-19 infection  Acute respiratory failure resolved  -He is currently on room air and does not report any shortness of breath, cough or phlegm  -Was started on Decadron and remdesivir yesterday as he required oxygen. Continue for now and will stop if he remains on room air  -Check CRP and D-dimer in a.m.  -On Levaquin and Flagyl for suspected intra-abdominal infection by surgery. Check procalcitonin in a.m. SBO  Management as per primary team.   Currently NPO, on IVF, x-ray KUB per surgical team       HLD  HTN  Hold home statin while NPO  Continue as needed hydralazine        Code Status: Full   DVT Prophylaxis: lovenox         Body mass index is 29.86 kg/m². Subjective:     Chief Complaint / Reason for Physician Visit  Does not report any shortness of breath, cough or phlegm. He is on room air. Reports abdominal pain but better    Review of Systems:  Symptom Y/N Comments  Symptom Y/N Comments   Fever/Chills    Chest Pain     Poor Appetite    Edema     Cough    Abdominal Pain     Sputum    Joint Pain     SOB/RAGSDALE    Pruritis/Rash     Nausea/vomit    Tolerating PT/OT     Diarrhea    Tolerating Diet     Constipation    Other       Could NOT obtain due to:      Objective:     VITALS:   Last 24hrs VS reviewed since prior progress note. Most recent are:  Patient Vitals for the past 24 hrs:   Temp Pulse Resp BP SpO2   02/10/22 1112 98.3 °F (36.8 °C) 65 18 (!) 141/79 94 %   02/10/22 0850 98.1 °F (36.7 °C) 61 18 (!) 164/73 96 %   22 2352 97.7 °F (36.5 °C) 77 18 (!) 158/91 95 %   22 1945 97.9 °F (36.6 °C) 72 18 (!) 158/82 94 %   22 1648 98.1 °F (36.7 °C) 60 18 (!) 164/82 96 %       Intake/Output Summary (Last 24 hours) at 2/10/2022 1319  Last data filed at 2/10/2022 0948  Gross per 24 hour   Intake    Output 3100 ml   Net -3100 ml        PHYSICAL EXAM:  General: WD, WN. Alert, cooperative, no acute distress    EENT:  EOMI. Anicteric sclerae. MMM  Resp:  CTA bilaterally, no wheezing or rales. No accessory muscle use  CV:  Regular  rhythm,  No edema  GI:  Soft,  nontender, no guarding  Neurologic:  Alert and oriented X 3, normal speech,   Psych:   Good insight. Not anxious nor agitated  Skin:  No rashes.   No jaundice    Reviewed most current lab test results and cultures  YES  Reviewed most current radiology test results   YES  Review and summation of old records today    NO  Reviewed patient's current orders and MAR    YES  PMH/SH reviewed - no change compared to H&P          Current Facility-Administered Medications:     pantoprazole (PROTONIX) 40 mg in 0.9% sodium chloride 10 mL injection, 40 mg, IntraVENous, Q12H, Digna Rojas NP, 40 mg at 02/10/22 1139    dexamethasone (DECADRON) 4 mg/mL injection 6 mg, 6 mg, IntraVENous, Q24H, Francisca León MD, 6 mg at 02/10/22 0942    albuterol (PROVENTIL HFA, VENTOLIN HFA, PROAIR HFA) inhaler 1 Puff, 1 Puff, Inhalation, Q4H PRN, Steff Sharp MD    enoxaparin (LOVENOX) injection 40 mg, 40 mg, SubCUTAneous, Q24H, Francisca Velázquez MD, 40 mg at 02/10/22 0942    hydrALAZINE (APRESOLINE) 20 mg/mL injection 10 mg, 10 mg, IntraVENous, Q6H PRN, Steff Sharp MD    [COMPLETED] remdesivir 200 mg in 0.9% sodium chloride 250 mL IVPB, 200 mg, IntraVENous, ONCE, Last Rate: 500 mL/hr at 02/09/22 1232, 200 mg at 02/09/22 1232 **FOLLOWED BY** remdesivir 100 mg in 0.9% sodium chloride 250 mL IVPB, 100 mg, IntraVENous, Q24H, Janet León MD, Last Rate: 500 mL/hr at 02/10/22 1139, 100 mg at 02/10/22 1139    0.9% sodium chloride infusion, 125 mL/hr, IntraVENous, CONTINUOUS, Fabiola Patterson MD, Last Rate: 125 mL/hr at 02/09/22 0653, 125 mL/hr at 02/09/22 0653    HYDROmorphone (DILAUDID) injection 0.5 mg, 0.5 mg, IntraVENous, Q2H PRN, Fabiola Patterson MD, 0.5 mg at 02/09/22 2201    ondansetron (ZOFRAN) injection 4 mg, 4 mg, IntraVENous, Q4H PRN, Fabiola Patterson MD  Meir Bark metroNIDAZOLE (FLAGYL) IVPB premix 500 mg, 500 mg, IntraVENous, Q12H, Kait MOTLEY MD, Last Rate: 100 mL/hr at 02/10/22 0430, 500 mg at 02/10/22 0430    levoFLOXacin (LEVAQUIN) 750 mg in D5W IVPB, 750 mg, IntraVENous, Q24H, Jolynn Cooper MD, Last Rate: 100 mL/hr at 02/09/22 1803, 750 mg at 02/09/22 1803  ________________________________________________________________________  Care Plan discussed with:    Comments   Patient y    Family      RN y    Care Manager     Consultant                        Multidiciplinary team rounds were held today with , nursing, pharmacist and clinical coordinator. Patient's plan of care was discussed; medications were reviewed and discharge planning was addressed. ________________________________________________________________________  Total NON critical care TIME:  35   Minutes    Total CRITICAL CARE TIME Spent:   Minutes non procedure based      Comments   >50% of visit spent in counseling and coordination of care     ________________________________________________________________________  Sarbjit Zepeda MD     Procedures: see electronic medical records for all procedures/Xrays and details which were not copied into this note but were reviewed prior to creation of Plan. LABS:  I reviewed today's most current labs and imaging studies.   Pertinent labs include:  Recent Labs     02/09/22  0216 02/08/22  1228   WBC 9.6 14.7*   HGB 14.1 16.6   HCT 43.2 49.1    240     Recent Labs     02/09/22  0216 02/08/22  1228    137   K 4.0 4.3   * 103   CO2 29 28   * 130*   BUN 14 17   CREA 0.70 0.95   CA 8.3* 9.8   ALB  --  4.0   TBILI  --  0.6   ALT  --  27       Signed: Sarbjit Zepeda MD

## 2022-02-10 NOTE — PROGRESS NOTES
Bedside and Verbal shift change report given to tamy (oncoming nurse) by Cheli Lopez (offgoing nurse). Report included the following information SBAR, Kardex, Intake/Output, MAR and Recent Results.

## 2022-02-10 NOTE — ADT AUTH CERT NOTES
Positive COVID 19 by Milton Jarvis RN       Review Status Review Entered   In Primary 2/10/2022 12:59      Criteria Review     2/8/22 1228     Specimen source   Nasopharyngeal    COVID-19 rapid test NOTD   Detected             Is the illness suspected to be related to the Coronavirus (COVID-19)? yes  Has the member been tested for the COVID-19? Yes   If Yes, what are the results of the COVID-19? Positive  What is the severity of the members condition (i.e. Isolation, Ventilator use)?        DROPLET PLUS ISOLATION  O2 2.5-1LPM NC           2/9/22 Payor requested information by Milton Jarvis RN       Review Status Review Entered   In Primary 2/10/2022 12:57      Criteria Review   O2 1LPM NC IN USE. SPO2 98%. PLAN OF CARE 2/9>  SURGERY> Plan/Recommendations/Medical Decision Making:     Continue present treatment   Hospitalist consult requested on admission in ED yesterday midday - not yet assigned to a provider. Pt with COVID+  NGT sitting at 27 cm on rounds this morning. No outputs recorded since admission. Replace NGT  Strict I/Os  Abdominal films pending  Hospitalist consult  WBC normal today  Continue levaquin/flagyl for now     INTERNAL MED>  Recommendations/Plan:     Acute hypoxic respiratory failure due to COVID19 infection, likely early PNA   Vaccinated with Pfizer x3  CXR neg for acute process. No leukocytosis. Currently requiring 2.5LNC. Will check procal, ddimer and crp level   Unfortunately given his SBO status, he is at risk for bowel perforation so will avoid actemra and baricitinib  Start IV decadron, add dvt prophylaxis with lovenox  Consult pharmacy to see if pt is a candidate for remdesivir  Add albuterol inh  Pt is already on levaquin and flagyl for possible intraabdominal infection  Supportive care. Daily CMP  O2 suppl, wean as tolerated  Discussed with primary team, Dr Cecy Rojas     SBO  Management as per primary team.   Currently NPO, on IVF, monitor vol status.    HLD  HTN  Hold home statin while NPO  Will add prn IV hydralazine     LABS>   2/9/2022 02:16  WBC: 9.6  NRBC: 0.0  RBC: 4.76  HGB: 14.1  HCT: 43.2  MCV: 90.8  MCH: 29.6  MCHC: 32.6  RDW: 13.0  PLATELET: 053  MPV: 08.4  NEUTROPHILS: 77 (H)  LYMPHOCYTES: 16  MONOCYTES: 7  EOSINOPHILS: 0  BASOPHILS: 0  IMMATURE GRANULOCYTES: 0  DF: AUTOMATED  ABSOLUTE NRBC: 0.00  ABS. NEUTROPHILS: 7.3  ABS. IMM. GRANS.: 0.0  ABS. LYMPHOCYTES: 1.5  ABS. MONOCYTES: 0.7  ABS. EOSINOPHILS: 0.0  ABS. BASOPHILS: 0.0  Sodium: 143  Potassium: 4.0  Chloride: 110 (H)  CO2: 29  Anion gap: 4 (L)  Glucose: 102 (H)  BUN: 14  Creatinine: 0.70  BUN/Creatinine ratio: 20  Calcium: 8.3 (L)  GFR est non-AA: >60  KUB> Interval retraction of the NG tube, terminating over the proximal stomach with  sidehole projecting over the lower esophagus; recommend advancing 8-10 cm for  more optimal positioning.          Intestinal Obstruction - Care Day 3 (2/10/2022) by Milton Jarvis RN       Review Status Review Entered   Completed 2/10/2022 12:51      Criteria Review      Care Day: 3 Care Date: 2/10/2022 Level of Care: Inpatient Floor    Guideline Day 2    Level Of Care    (X) Floor    2/10/2022 12:51:28 EST by Milton Jarvis      SURGICAL BED. Clinical Status    (X) * Hypotension absent    2/10/2022 12:51:28 EST by Loreta Hill      /79    (X) * Nausea and vomiting absent or improved    2/10/2022 12:51:28 EST by Milton Jarvis      abdominal pain has totally resolved. (X) * Pain absent or managed    2/10/2022 12:51:28 EST by Milton Jarvis      abdominal pain has totally resolved. (X) * Abdominal exam stable or improved    2/10/2022 12:51:28 EST by Milton Jarvis      SEE PE    Activity    (X) Activity as tolerated    2/10/2022 12:51:28 EST by Loreta Hill      AMBULATE WITH ASSIST. Routes    (X) IV fluids    2/10/2022 12:51:28 EST by Liz, [address] [place] [address] @ 125ML/HOUR.     (X) IV medications    2/10/2022 12:51:28 EST by Milton SANCHEZ 6MG IV Q24H.  DILAUDID 0.5MG IV Q2H PRN. LEVAQUIN 750MG IV Q24H. FLAGYL 500MG IV Q12H. PROTONIX 40MG IV Q12H. REMDESIVIR 100MG IV Q24H. ( ) Diet as tolerated    2/10/2022 12:51:28 EST by Lisa RILEY. Interventions    ( ) * NG tube absent    2/10/2022 12:51:28 EST by Paresh Dao      NGT ORDERED TO MCS.    (X) Surgical re-evaluation    2/10/2022 12:51:28 EST by Cora Morley. SEE NOTES. * Milestone   Additional Notes   2/10/22   DAY THREE IP SURGICAL BED      POC GLUCOSE 137-88.   98.3 P 65 RR 18 SPO2 94% ROOM AIR. MEDS/ORDERS>   LOVENOX 40MG SC Q24H.      ENTERIC ISOLATION. DROPLET PLUS ISOLATION. SCDS. CONTINUOUS VS.         Physical Exam   Vitals and nursing note reviewed. Exam conducted with a chaperone present (NP Danilo Comer). Constitutional:        General: He is not in acute distress.      Appearance: Normal appearance. He is not ill-appearing. Cardiovascular:       Rate and Rhythm: Normal rate and regular rhythm. Pulmonary:       Effort: Pulmonary effort is normal.    Abdominal:       General: Abdomen is flat. There is no distension.       Palpations: Abdomen is soft.       Tenderness: There is no abdominal tenderness. There is no guarding or rebound. Neurological:       Mental Status: He is alert.                 Principal Problem:     Small bowel obstruction ([address] (2/8/2022)       Active Problems:     COVID-19 (2/8/2022)                   ASSESSMENT/PLAN   Intestinal obstruction no previous abdominal surgery that I could tell or see evidence of based on scars he describes some type of rectal tear but does not appear to have any scars relating to any abdominal surgery. Rosemary Echeverria states his last colonoscopy was a few years ago and was unremarkable.  Patient states he lives with his life . ,  And it is okay to talk to her, I did call her to review and got voicemail and voicemail messages were full.       Patient is consuming moderate amount of liquids around his NG tube, we will clamp NG tube and give Gastrografin challenge and follow-up abdominal x-rays to see if this moves through his colon and if this does not improve or seems to worsen the may need to consider exploratory laparoscopy/laparotomy to assess for source of small bowel obstruction patient is aware the benefits risks alternatives recovery morbidity issues.       We will also add proton pump inhibitor given possible gastritis as there is some bloody NG tube output, this could be traumatic also, I have asked patient to minimize any p.o. intake.

## 2022-02-11 ENCOUNTER — APPOINTMENT (OUTPATIENT)
Dept: GENERAL RADIOLOGY | Age: 82
DRG: 388 | End: 2022-02-11
Attending: NURSE PRACTITIONER
Payer: MEDICARE

## 2022-02-11 VITALS
BODY MASS INDEX: 29.73 KG/M2 | SYSTOLIC BLOOD PRESSURE: 140 MMHG | DIASTOLIC BLOOD PRESSURE: 54 MMHG | HEART RATE: 60 BPM | OXYGEN SATURATION: 97 % | HEIGHT: 66 IN | RESPIRATION RATE: 16 BRPM | WEIGHT: 185 LBS | TEMPERATURE: 97.9 F

## 2022-02-11 LAB
ALBUMIN SERPL-MCNC: 3.2 G/DL (ref 3.5–5)
ALBUMIN/GLOB SERPL: 0.8 {RATIO} (ref 1.1–2.2)
ALP SERPL-CCNC: 89 U/L (ref 45–117)
ALT SERPL-CCNC: 24 U/L (ref 12–78)
ANION GAP SERPL CALC-SCNC: 4 MMOL/L (ref 5–15)
AST SERPL-CCNC: 36 U/L (ref 15–37)
BILIRUB SERPL-MCNC: 0.5 MG/DL (ref 0.2–1)
BUN SERPL-MCNC: 24 MG/DL (ref 6–20)
BUN/CREAT SERPL: 30 (ref 12–20)
CALCIUM SERPL-MCNC: 8.7 MG/DL (ref 8.5–10.1)
CAMPYLOBACTER SPECIES, DNA: NEGATIVE
CHLORIDE SERPL-SCNC: 113 MMOL/L (ref 97–108)
CO2 SERPL-SCNC: 28 MMOL/L (ref 21–32)
CREAT SERPL-MCNC: 0.8 MG/DL (ref 0.7–1.3)
CRP SERPL-MCNC: 1.3 MG/DL (ref 0–0.6)
D DIMER PPP FEU-MCNC: 1.03 MG/L FEU (ref 0–0.65)
ENTEROTOXIGEN E COLI, DNA: NEGATIVE
ERYTHROCYTE [DISTWIDTH] IN BLOOD BY AUTOMATED COUNT: 13.2 % (ref 11.5–14.5)
GLOBULIN SER CALC-MCNC: 3.8 G/DL (ref 2–4)
GLUCOSE SERPL-MCNC: 96 MG/DL (ref 65–100)
HCT VFR BLD AUTO: 43.1 % (ref 36.6–50.3)
HGB BLD-MCNC: 14.1 G/DL (ref 12.1–17)
MCH RBC QN AUTO: 29.6 PG (ref 26–34)
MCHC RBC AUTO-ENTMCNC: 32.7 G/DL (ref 30–36.5)
MCV RBC AUTO: 90.4 FL (ref 80–99)
NRBC # BLD: 0 K/UL (ref 0–0.01)
NRBC BLD-RTO: 0 PER 100 WBC
P SHIGELLOIDES DNA STL QL NAA+PROBE: NEGATIVE
PLATELET # BLD AUTO: 198 K/UL (ref 150–400)
PMV BLD AUTO: 10.3 FL (ref 8.9–12.9)
POTASSIUM SERPL-SCNC: 3.5 MMOL/L (ref 3.5–5.1)
PROCALCITONIN SERPL-MCNC: 0.05 NG/ML
PROT SERPL-MCNC: 7 G/DL (ref 6.4–8.2)
RBC # BLD AUTO: 4.77 M/UL (ref 4.1–5.7)
SALMONELLA SPECIES, DNA: NEGATIVE
SHIGA TOXIN PRODUCING, DNA: NEGATIVE
SHIGELLA SP+EIEC IPAH STL QL NAA+PROBE: NEGATIVE
SODIUM SERPL-SCNC: 145 MMOL/L (ref 136–145)
VIBRIO SPECIES, DNA: NEGATIVE
WBC # BLD AUTO: 11.9 K/UL (ref 4.1–11.1)
WBC #/AREA STL HPF: NORMAL /HPF (ref 0–4)
Y. ENTEROCOLITICA, DNA: NEGATIVE

## 2022-02-11 PROCEDURE — 74011250636 HC RX REV CODE- 250/636: Performed by: SURGERY

## 2022-02-11 PROCEDURE — 84145 PROCALCITONIN (PCT): CPT

## 2022-02-11 PROCEDURE — 74011250636 HC RX REV CODE- 250/636: Performed by: INTERNAL MEDICINE

## 2022-02-11 PROCEDURE — 36415 COLL VENOUS BLD VENIPUNCTURE: CPT

## 2022-02-11 PROCEDURE — 86140 C-REACTIVE PROTEIN: CPT

## 2022-02-11 PROCEDURE — 74011000258 HC RX REV CODE- 258: Performed by: INTERNAL MEDICINE

## 2022-02-11 PROCEDURE — 80053 COMPREHEN METABOLIC PANEL: CPT

## 2022-02-11 PROCEDURE — 74011000250 HC RX REV CODE- 250: Performed by: NURSE PRACTITIONER

## 2022-02-11 PROCEDURE — 85027 COMPLETE CBC AUTOMATED: CPT

## 2022-02-11 PROCEDURE — 85379 FIBRIN DEGRADATION QUANT: CPT

## 2022-02-11 PROCEDURE — 99232 SBSQ HOSP IP/OBS MODERATE 35: CPT | Performed by: SURGERY

## 2022-02-11 PROCEDURE — 74011250636 HC RX REV CODE- 250/636: Performed by: NURSE PRACTITIONER

## 2022-02-11 PROCEDURE — C9113 INJ PANTOPRAZOLE SODIUM, VIA: HCPCS | Performed by: NURSE PRACTITIONER

## 2022-02-11 PROCEDURE — 74019 RADEX ABDOMEN 2 VIEWS: CPT

## 2022-02-11 RX ADMIN — REMDESIVIR 100 MG: 100 INJECTION, POWDER, LYOPHILIZED, FOR SOLUTION INTRAVENOUS at 09:08

## 2022-02-11 RX ADMIN — SODIUM CHLORIDE, PRESERVATIVE FREE 40 MG: 5 INJECTION INTRAVENOUS at 09:04

## 2022-02-11 RX ADMIN — METRONIDAZOLE 500 MG: 500 INJECTION, SOLUTION INTRAVENOUS at 05:57

## 2022-02-11 RX ADMIN — ENOXAPARIN SODIUM 40 MG: 100 INJECTION SUBCUTANEOUS at 09:03

## 2022-02-11 RX ADMIN — DEXAMETHASONE SODIUM PHOSPHATE 6 MG: 4 INJECTION, SOLUTION INTRAMUSCULAR; INTRAVENOUS at 09:04

## 2022-02-11 NOTE — PROGRESS NOTES
No further needs identified at this time. Patient is ready to d/c on a CM standpoint. RN aware. Transition of Care Plan:     RUR: 7% low risk  Disposition: Home with family assistance  Follow up appointments: PCP  DME needed: Pt has a walker and cane  Transportation at Discharge: Pt's significant other  Keys or means to access home:      Pt's SO has access  IM Medicare Letter: Received on 2/11/2022  Is patient a BCPI-A Bundle:  No              If yes, was Bundle Letter given?:    Is patient a [place] and connected with the [address] ? No            If yes, was [employer] transfer form completed and [facility] notified? Caregiver Contact: Life partner, Julia Davis ([phone])  Discharge Caregiver contacted prior to discharge? Pt will contact cg prior to d/c  Care Conference needed?:  N/A    CM acknowledged d/c orders. Pt will d/c home with family assistance. Pt's son will transport him home. Pt reports no questions or concerns about d/c. Care Management Interventions  PCP Verified by CM: Yes (Dr. Mayito Deal)  Mode of Transport at Discharge:  Other (see comment) (Pt's significant other)  Transition of Care Consult (CM Consult): Discharge Planning  Discharge Durable Medical Equipment: No (Pt has a walker and cane)  Physical Therapy Consult: No  Occupational Therapy Consult: No  Speech Therapy Consult: No  Support Systems: Spouse/Significant Other,Child(chapito)  Confirm Follow Up Transport: Self  Discharge Location  Patient Expects to be Discharged to[de-identified] Home with family assistance    LINUS [employer]  Care Manager [facility]  [phone]

## 2022-02-11 NOTE — PROGRESS NOTES
Hospitalist Progress Note    NAME: Nallely Hughes   :  1940   MRN:  [id number]       Assessment / Plan:  COVID-19 infection  Acute respiratory failure resolved  -He is currently on room air and does not report any shortness of breath, cough or phlegm  -Stop Decadron and remdesivir.  -He does not need any further treatment for COVID-19. He will need to isolate at home for 5 more days.  -Antibiotics were stopped by surgery. No need of antibiotics for COVID-19. Procalcitonin is normal.    SBO  -Management per surgery       HLD  HTN  Hold home statin while NPO  Continue as needed hydralazine        Code Status: Full   DVT Prophylaxis: lovenox     Cleared from hospitalist standpoint for discharge. He will need to follow-up with PCP within 1 week. Body mass index is 29.86 kg/m². Subjective:     Chief Complaint / Reason for Physician Visit  Does not report any shortness of breath, cough or phlegm. No chest pain. Abdominal pain is improved  Able to tolerate diet    Review of Systems:  Symptom Y/N Comments  Symptom Y/N Comments   Fever/Chills    Chest Pain     Poor Appetite    Edema     Cough    Abdominal Pain     Sputum    Joint Pain     SOB/RAGSDALE    Pruritis/Rash     Nausea/vomit    Tolerating PT/OT     Diarrhea    Tolerating Diet     Constipation    Other       Could NOT obtain due to:      Objective:     VITALS:   Last 24hrs VS reviewed since prior progress note. Most recent are:  Patient Vitals for the past 24 hrs:   Temp Pulse Resp BP SpO2   22 1102 97.9 °F (36.6 °C) 60 16 (!) 140/54 97 %   02/10/22 1945 98.7 °F (37.1 °C) 71 17 138/72 96 %   02/10/22 1606 99 °F (37.2 °C) (!) 59 16 (!) 154/72 95 %       Intake/Output Summary (Last 24 hours) at 2022 1329  Last data filed at 2/10/2022 2300  Gross per 24 hour   Intake    Output 1400 ml   Net -1400 ml        PHYSICAL EXAM:  General: WD, WN. Alert, cooperative, no acute distress    EENT:  EOMI. Anicteric sclerae.  MMM  Resp:  CTA bilaterally, no wheezing or rales. No accessory muscle use  CV:  Regular  rhythm,  No edema  GI:  Soft,  nontender, no guarding  Neurologic:  Alert and oriented X 3, normal speech,   Psych:   Good insight. Not anxious nor agitated  Skin:  No rashes. No jaundice    Reviewed most current lab test results and cultures  YES  Reviewed most current radiology test results   YES  Review and summation of old records today    NO  Reviewed patient's current orders and MAR    YES  PMH/SH reviewed - no change compared to H&P          Current Facility-Administered Medications:     pantoprazole (PROTONIX) 40 mg in 0.9% sodium chloride 10 mL injection, 40 mg, IntraVENous, Q12H, Elsa Chu NP, 40 mg at 02/11/22 0904    albuterol (PROVENTIL HFA, VENTOLIN HFA, PROAIR HFA) inhaler 1 Puff, 1 Puff, Inhalation, Q4H PRN, Shanika Mascorro MD    enoxaparin (LOVENOX) injection 40 mg, 40 mg, SubCUTAneous, Q24H, Janet León MD, 40 mg at 02/11/22 5741    hydrALAZINE (APRESOLINE) 20 mg/mL injection 10 mg, 10 mg, IntraVENous, Q6H PRN, Shanika Mascorro MD    0.9% sodium chloride infusion, 125 mL/hr, IntraVENous, CONTINUOUS, Ailene Lefort, MD, Last Rate: 125 mL/hr at 02/09/22 0653, 125 mL/hr at 02/09/22 0653    HYDROmorphone (DILAUDID) injection 0.5 mg, 0.5 mg, IntraVENous, Q2H PRN, Ailene Lefort, MD, 0.5 mg at 02/10/22 2317    ondansetron [facility]) injection 4 mg, 4 mg, IntraVENous, Q4H PRN, Ailene Lefort, MD  ________________________________________________________________________  Care Plan discussed with:    Comments   Patient y    Family      RN y    Care Manager     Consultant                        Multidiciplinary team rounds were held today with , nursing, pharmacist and clinical coordinator. Patient's plan of care was discussed; medications were reviewed and discharge planning was addressed.      ________________________________________________________________________  Total NON critical care TIME:  35   Minutes    Total CRITICAL CARE TIME Spent: Minutes non procedure based      Comments   >50% of visit spent in counseling and coordination of care     ________________________________________________________________________  Marguerite De Leon MD     Procedures: see electronic medical records for all procedures/Xrays and details which were not copied into this note but were reviewed prior to creation of Plan. LABS:  I reviewed today's most current labs and imaging studies.   Pertinent labs include:  Recent Labs     02/11/22 0333 02/09/22 0216   WBC 11.9* 9.6   HGB 14.1 14.1   HCT 43.1 43.2    198     Recent Labs     02/11/22 0333 02/09/22 0216    143   K 3.5 4.0   * 110*   CO2 28 29   GLU 96 102*   BUN 24* 14   CREA 0.80 0.70   CA 8.7 8.3*   ALB 3.2*  --    TBILI 0.5  --    ALT 24  --        Signed: Marguerite De Leon MD

## 2022-02-11 NOTE — PROGRESS NOTES
Bedside and Verbal shift change report given to Guido (oncoming nurse) by Chelsi Gilbert (offgoing nurse). Report included the following information SBAR, Kardex, Procedure Summary, Intake/Output, MAR and Recent Results.

## 2022-02-11 NOTE — PROGRESS NOTES
Discharge instructions reviewed with patient. Pt has no questions at this time. Pt now ready for discharge.

## 2022-02-11 NOTE — PROGRESS NOTES
0 - Spoke with patient via telephone call to give PCP follow up appointment. Patient is aware of appointment date and time. Patient is aware that this is a telephone call. Hospital follow-up PCP Telemedicine visit has been scheduled with DONN Montenegro on 2/15/22 at 0830. Pending patient discharge.   Alexandru Garcia, Care Management Specialist

## 2022-02-11 NOTE — PROGRESS NOTES
Spiritual Care Assessment/Progress Note  [facility]      NAME: Carmenza Huerta      MRN: [id number]  AGE: [de-identified] y.o.  SEX: male  Quaker Affiliation: Non Episcopal   Language: English     2/11/2022     Total Time (in minutes): 17     Spiritual Assessment begun in [facility] 2 MED TELE through conversation with:         [x]Patient        [] Family    [] Friend(s)        Reason for Consult: Initial/Spiritual assessment, patient floor     Spiritual beliefs: (Please include comment if needed)     [x] Identifies with a rachele tradition:         [] Supported by a rachele community:            [] Claims no spiritual orientation:           [] Seeking spiritual identity:                [] Adheres to an individual form of spirituality:           [] Not able to assess:                           Identified resources for coping:      [] Prayer                               [] Music                  [] Guided Imagery     [x] Family/friends                 [] Pet visits     [] Devotional reading                         [] Unknown     [] Other:                                               Interventions offered during this visit: (See comments for more details)    Patient Interventions: Normalization of emotional/spiritual concerns,Coping skills reviewed/reinforced,Initial/Spiritual assessment, patient floor,Prayer (assurance of)           Plan of Care:     [] Support spiritual and/or cultural needs    [] Support AMD and/or advance care planning process      [] Support grieving process   [] Coordinate Rites and/or Rituals    [] Coordination with community clergy   [] No spiritual needs identified at this time   [] Detailed Plan of Care below (See Comments)  [] Make referral to Music Therapy  [] Make referral to Pet Therapy     [] Make referral to Addiction services  [] Make referral to [facility]  [] Make referral to Spiritual Care Partner  [] No future visits requested        [x] Contact Spiritual Care for further referrals     Comments:  Patient sounded in good spirit when  called for initial spiritual assessment. He stated he was doing well today. He has a good support system and he exercises and eats right to keep his spirit up. He expressed no particular need for support at time of call. He thanked  for the call. Visited by: Jesusita Smith.    Paging Service: [phone])

## 2022-02-11 NOTE — PROGRESS NOTES
Admit Date: 2022      POD * No surgery found *  * No surgery found *      Procedure:  * No surgery found *        HOSPITAL DAY:     ANTIBIOTICS:      HPI:  Patient's abdominal x-rays after Gastrografin shows passage of contrast into the colon, patient has had multiple bowel movements, no nausea or vomiting during the previous clamping of NG tube. NG tube removed, patient hungry and thirsty. Temp:  [97.7 °F (36.5 °C)-99 °F (37.2 °C)]   Pulse (Heart Rate):  [55-96]   BP: (123-164)/(63-91)   Resp Rate:  [16-18]   O2 Sat (%):  [86 %-100 %]   Weight:  [83.9 kg (185 lb)]       Intake and Output:  Current Shift: No intake/output data recorded. Last three shifts:  1901 -  0700  In: -   Out: 4450 [Urine:1000]     Blood pressure 138/72, pulse 71, temperature 98.7 °F (37.1 °C), resp. rate 17, height 5' 6\" (1.676 m), weight 83.9 kg (185 lb), SpO2 96 %. Temp (24hrs), Av.7 °F (37.1 °C), Min:98.3 °F (36.8 °C), Max:99 °F (37.2 °C)        Review of Systems   HENT: Negative. Respiratory: Negative. Cardiovascular: Negative. Gastrointestinal: Negative for abdominal pain, nausea and vomiting. Psychiatric/Behavioral: Negative. All other systems reviewed and are negative. Physical Exam  Vitals and nursing note reviewed. Constitutional:       General: He is not in acute distress. Appearance: Normal appearance. He is not ill-appearing. Cardiovascular:      Rate and Rhythm: Normal rate. Pulmonary:      Effort: Pulmonary effort is normal.   Abdominal:      General: Abdomen is flat. Palpations: Abdomen is soft. Musculoskeletal:         General: Normal range of motion. Skin:     General: Skin is warm and dry. Neurological:      General: No focal deficit present. Mental Status: He is alert and oriented to person, place, and time. Psychiatric:         Mood and Affect: Mood normal.         Behavior: Behavior normal.         Thought Content:  Thought content normal. Judgment: Judgment normal.         Recent Results (from the past 48 hour(s))   GLUCOSE, POC    Collection Time: 02/10/22  9:12 AM   Result Value Ref Range    Glucose (POC) 137 (H) 65 - 117 mg/dL    Performed by Adriana Rojo (PCT)    GLUCOSE, POC    Collection Time: 02/10/22 11:27 AM   Result Value Ref Range    Glucose (POC) 88 65 - 117 mg/dL    Performed by Adriana Rojo (PCT)    WBC, STOOL    Collection Time: 02/10/22  6:19 PM   Result Value Ref Range    White blood cells, stool 0 TO 4 0 - 4 /HPF   C REACTIVE PROTEIN, QT    Collection Time: 02/11/22  3:33 AM   Result Value Ref Range    C-Reactive protein 1.30 (H) 0.00 - 0.60 mg/dL   D DIMER    Collection Time: 02/11/22  3:33 AM   Result Value Ref Range    D-dimer 1.03 (H) 0.00 - 0.65 mg/L FEU   CBC W/O DIFF    Collection Time: 02/11/22  3:33 AM   Result Value Ref Range    WBC 11.9 (H) 4.1 - 11.1 K/uL    RBC 4.77 4.10 - 5.70 M/uL    HGB 14.1 12.1 - 17.0 g/dL    HCT 43.1 36.6 - 50.3 %    MCV 90.4 80.0 - 99.0 FL    MCH 29.6 26.0 - 34.0 PG    MCHC 32.7 30.0 - 36.5 g/dL    RDW 13.2 11.5 - 14.5 %    PLATELET 305 139 - 592 K/uL    MPV 10.3 8.9 - 12.9 FL    NRBC 0.0 0  WBC    ABSOLUTE NRBC 0.00 0.00 - 6.42 K/uL   METABOLIC PANEL, COMPREHENSIVE    Collection Time: 02/11/22  3:33 AM   Result Value Ref Range    Sodium 145 136 - 145 mmol/L    Potassium 3.5 3.5 - 5.1 mmol/L    Chloride 113 (H) 97 - 108 mmol/L    CO2 28 21 - 32 mmol/L    Anion gap 4 (L) 5 - 15 mmol/L    Glucose 96 65 - 100 mg/dL    BUN 24 (H) 6 - 20 MG/DL    Creatinine 0.80 0.70 - 1.30 MG/DL    BUN/Creatinine ratio 30 (H) 12 - 20      GFR est AA >60 >60 ml/min/1.73m2    GFR est non-AA >60 >60 ml/min/1.73m2    Calcium 8.7 8.5 - 10.1 MG/DL    Bilirubin, total 0.5 0.2 - 1.0 MG/DL    ALT (SGPT) 24 12 - 78 U/L    AST (SGOT) 36 15 - 37 U/L    Alk.  phosphatase 89 45 - 117 U/L    Protein, total 7.0 6.4 - 8.2 g/dL    Albumin 3.2 (L) 3.5 - 5.0 g/dL    Globulin 3.8 2.0 - 4.0 g/dL    A-G Ratio 0.8 (L) 1.1 - 2.2 PROCALCITONIN    Collection Time: 02/11/22  3:33 AM   Result Value Ref Range    Procalcitonin 0.05 ng/mL         XR Results (maximum last 3): Results from [place] encounter on 02/08/22    XR ABD (KUB)    Impression  Oral contrast is noted in the colon. No substantial small bowel  distention is identified. CT Results (maximum last 3): Results from [place] encounter on 02/08/22    CT ABD PELV W CONT    Impression  1. Small bowel obstruction. Transition point at the level of the right mid  abdominal ileum where there is a thickened segment measuring approximately 40 cm  in length. Segment of mildly thickened proximal jejunum. 2. Circumferential thickening of the distal esophagus. Recommend endoscopy for  further evaluation. 3. Increase in size of infrarenal fusiform abdominal aortic aneurysm, now  measuring 3 cm in AP dimension. MRI Results (maximum last 3): Results from [place] encounter on 03/20/09    MRI LUMBAR SPINE W AND W/O CONT      Nuclear Medicine Results (maximum last 3): No results found for this or any previous visit. US Results (maximum last 3): Results from [place] encounter on 07/19/12    US ABD COMP            Principal Problem:    Small bowel obstruction ([address] (2/8/2022)    Active Problems:    COVID-19 (2/8/2022)          ASSESSMENT/PLAN  Resolving small bowel obstruction versus other GI intestinal ileus or enteritis, overall improved. Patient with no pulmonary symptoms for his Covid issues. Have DC'd NG tube, start full liquids, possibly home this afternoon or tomorrow pending tolerance of    Diet and recommendations from pulmonary regarding Covid positivity. Patient will need follow-up with his PCP for GI and Covid pulmonary issues.   Patient is aware and concurs      [address] time including review of any indicated imaging, discussion with patient, and other providers, exam and discussion with patient: 26       minutes    END:

## 2022-02-11 NOTE — DISCHARGE SUMMARY
Physician Discharge Summary     Patient ID:  Zully Chadwick  [id number]  71 y.o.  1940    Admit Date: 2/8/2022    Discharge Date: 2/11/2022    Admission Diagnoses: Small bowel obstruction [facility]) [B09.013]    Discharge Diagnoses:  Principal Problem:    Small bowel obstruction ([address] (2/8/2022)    Active Problems:    COVID-19 (2/8/2022)         Admission Condition: [address]    Discharge Condition: Good    Last Procedure: * No surgery found *      Hospital Course:   Pt admitted with SBO and improved with bowel rest and NGT decompression and gastrograffin oral contrast challenge moved into colon suggesting resolution of SBO, and NGT removed and Tolerated diet, and no abd pain and nl BM again, ready for DC  Hosptialist saw pt for COVID + and pt received course remdisivir, but pulmonary issues nl on discharge, no further pulm meds needed. Review of Systems   Gastrointestinal: Negative. All other systems reviewed and are negative. Physical Exam  Vitals and nursing note reviewed. Constitutional:       General: He is not in acute distress. Appearance: Normal appearance. He is not ill-appearing. Cardiovascular:      Rate and Rhythm: Normal rate. Pulmonary:      Effort: Pulmonary effort is normal.   Abdominal:      General: Abdomen is flat. Palpations: Abdomen is soft. Tenderness: There is no abdominal tenderness. Neurological:      General: No focal deficit present. Mental Status: He is alert and oriented to person, place, and time. Psychiatric:         Mood and Affect: Mood normal.         Behavior: Behavior normal.         Thought Content: Thought content normal.         Judgment: Judgment normal.            Normal hospital course for this procedure. see above     Consults: Hospitalist internal med for COVID+     Disposition: home    Patient Instructions:   Current Discharge Medication List      CONTINUE these medications which have NOT CHANGED    Details   losartan (COZAAR) 25 mg tablet Take 25 mg by mouth daily. acetaminophen (TylenoL) 325 mg tablet Take 650 mg by mouth every four (4) hours as needed for Pain.      vit A/vit C/vit E/zinc/copper (PRESERVISION AREDS PO) Take 1 Tablet by mouth daily. multivitamin (ONE A DAY) tablet Take 1 Tab by mouth daily. omega-3 fatty acids-vitamin e (FISH OIL) 1,000 mg Cap Take 1 Cap by mouth daily. Activity: Activity as tolerated  Diet: low fiber x 1 weekthen ad shanthi   Wound Care: None needed    Follow-up with PCP in 1 week.   Follow-up tests/labs none    Signed:  Nils Barkley MD  [facility]  2/11/2022  2:15 PM .

## 2022-02-14 ENCOUNTER — PATIENT OUTREACH (OUTPATIENT)
Dept: CASE MANAGEMENT | Age: 82
End: 2022-02-14

## 2022-02-14 LAB
O+P SPEC MICRO: NORMAL
O+P STL CONC: NORMAL
SPECIMEN SOURCE: NORMAL

## 2022-02-14 NOTE — PROGRESS NOTES
2022 at 1:20pm:  Care Transitions Outreach Attempt    Call within 2 business days of discharge: Yes   Attempted to reach patient for transitions of care follow up. Unable to reach patient. Patient does not have a current PHI form scanned into his chart at this time. Patient: Caren Pena Patient : 1940 MRN: [id number]    Last Discharge [facility]       Complaint Diagnosis Description Type Department Provider    22 Nausea; Vomiting Small bowel obstruction ([facility] [address] . .. ED to Hosp-Admission (Discharged) (ADMIT) Matt Ham MD; Calleen Rater. .. Was this an external facility discharge? No     Noted following upcoming appointments from discharge chart review:   [facility] follow up appointment(s):  No future appointments. Non-[facility] follow up appointment(s): None noted at this time. 2-15-22 at 11:43am:  Care Transitions Initial Call    Call within 2 business days of discharge:  Yes     Patient: Caren Pena Patient : 1940 MRN: [id number]    Last Discharge [address]       Complaint Diagnosis Description Type Department Provider    22 Nausea; Vomiting Small bowel obstruction ([facility] . .. ED to Hosp-Admission (Discharged) (ADMIT) Matt Ham MD; Calleen Rater. .. Was this an external facility discharge? No     Challenges to be reviewed by the provider   Additional needs identified to be addressed with provider:  no  none       Method of communication with provider:  none, patient has no red flags to report at this time and patient's PCP, Dr. Franny Davila is with [facility], and is a non-[facility] provider.       Component      Latest Ref Rng & Units 2022           3:33 AM  2:16 AM 12:28 PM 12:28 PM   Chloride      97 - 108 mmol/L 113 (H) 110 (H)  103     Component      Latest Ref Rng & Units 2022           3:33 AM  2:16 AM 12:28 PM 12:28 PM   BUN      6 - 20 MG/DL 24 (H) 14  17     Component      Latest Ref Rng & Units 2/11/2022 2/9/2022 2/8/2022 2/8/2022           3:33 AM  2:16 AM 12:28 PM 12:28 PM   BUN/Creatinine ratio      12 - 20   30 (H) 20  18     Component      Latest Ref Rng & Units 2/11/2022 2/9/2022 2/8/2022 2/8/2022           3:33 AM  2:16 AM 12:28 PM 12:28 PM   Albumin      3.5 - 5.0 g/dL 3.2 (L)   4.0     Component      Latest Ref Rng & Units 2/11/2022 2/9/2022 2/8/2022           3:33 AM  2:16 AM 12:28 PM   WBC      4.1 - 11.1 K/uL 11.9 (H) 9.6 14.7 (H)     Component      Latest Ref Rng & Units 2/9/2022 2/8/2022           2:16 AM 12:28 PM   NEUTROPHILS      32 - 75 % 77 (H) 87 (H)     Component      Latest Ref Rng & Units 2/11/2022           3:33 AM   D-dimer      0.00 - 0.65 mg/L FEU 1.03 (H)     Component      Latest Ref Rng & Units 2/11/2022           3:33 AM   C-Reactive protein      0.00 - 0.60 mg/dL 1.30 (H)     Discussed COVID-19 related testing which was available at this time. Test results were positive. Patient informed of results, if available?  yes     Advance Care Planning:   Does patient have an Advance Directive:  yes; reviewed and current     Inpatient Readmission Risk score: Unplanned Readmit Risk Score: 6.6 ( )    Was this a readmission? No.    Patient stated reason for the admission: \"I was so miserable, I had never had anything like this before. \"     Patients top risk factors for readmission:  Medical condition - CAD, recent small bowel obstruction, recent Covid-19 positive, and hyperlipidemia per chart. Interventions to address risk factors: Obtained and reviewed discharge summary and/or continuity of care documents and Education of patient/family/caregiver/guardian to support self-management-Education provided regarding signs/symptoms of small bowel obstruction and Covid-19 virus, patient verbalized an understanding. Care Transition Nurse (CTN) contacted the patient by telephone to perform post hospital discharge assessment. Verified name and  with patient as identifiers. Provided introduction to self, and explanation of the CTN role. CTN reviewed discharge instructions, medical action plan and red flags with patient who verbalized understanding. Were discharge instructions available to patient? yes. Reviewed appropriate site of care based on symptoms and resources available to patient including:  PCP and When to call 911. Patient given an opportunity to ask questions and does not have any further questions or concerns at this time. The patient agrees to contact the PCP office for questions related to their healthcare. Medication reconciliation was performed with patient, who verbalizes understanding of administration of home medications. Advised obtaining a 90-day supply of all daily and as-needed medications. Referral to Pharm D needed: no     Home Health/Outpatient orders at discharge: [address]: n/a  Date of initial visit: [address] [place] [address] ordered at discharge: None  [address]: [address] [place] [address] received: n/a    Covid Risk Education    Educated patient about risk for severe COVID-19 due to risk factors according to CDC guidelines. CTN reviewed discharge instructions, medical action plan and red flag symptoms with the patient who verbalized understanding. Discussed COVID vaccination status: yes. Education provided on COVID-19 vaccination as appropriate. Discussed exposure protocols and quarantine with CDC Guidelines. Patient was given an opportunity to verbalize any questions and concerns and agrees to contact CTN or health care provider for questions related to their healthcare. Was patient discharged with a pulse oximeter? No.     Discussed follow-up appointments. If no appointment was previously scheduled, appointment scheduling offered: n/a. Is follow up appointment scheduled within 7 days of discharge?   Yes, patient states he had a virtual [facility] appointment today, 2-, with Mahesh Rosen, NP (at the office of Dr. Ziyad Bhagat/PCP). [facility] follow up appointment(s): No future appointments. Non-[facility] follow up appointment(s): None noted at this time. Plan for follow-up call in 10-14 days based on severity of symptoms and risk factors. Plan for next call: self management-Review red flags of small bowel obstruction and Covid-19 virus, and follow up appointment-Patient attended [facility] appointment on 2-, offer assistance with the scheduling of future appointments as needed. CTN provided contact information for future needs. Goals Addressed                 This Visit's Progress     Understands red flags post discharge. 2-15-22: Red flags of small bowel obstruction reviewed with patient and patient verbalized an understanding. Patient states he is utilizing a low fiber diet at home, appetite is good. Patient states he has no signs/symptoms of Covid-19 present at this time. Patient has no red flags to report and states, \"I'm doing awesome. \" Care Transitions Nurse will review red flags again on next phone conversation with patient.  Ed López

## 2022-02-15 RX ORDER — ROSUVASTATIN CALCIUM 5 MG/1
TABLET, COATED ORAL
COMMUNITY

## 2022-02-15 NOTE — ACP (ADVANCE CARE PLANNING)
Patient states he does have a current ACP document, and patient states he will take his ACP document to a [facility] facility to have it scanned into his chart.

## 2022-03-03 ENCOUNTER — HOSPITAL ENCOUNTER (OUTPATIENT)
Dept: PREADMISSION TESTING | Age: 82
Discharge: HOME | End: 2022-03-03
Attending: PODIATRIST
Payer: MEDICARE

## 2022-03-03 VITALS
HEIGHT: 66 IN | SYSTOLIC BLOOD PRESSURE: 134 MMHG | RESPIRATION RATE: 20 BRPM | WEIGHT: 183.2 LBS | OXYGEN SATURATION: 97 % | DIASTOLIC BLOOD PRESSURE: 51 MMHG | BODY MASS INDEX: 29.44 KG/M2 | HEART RATE: 47 BPM | TEMPERATURE: 98.6 F

## 2022-03-03 LAB
ALBUMIN SERPL-MCNC: 3.2 G/DL (ref 3.5–5)
ALBUMIN/GLOB SERPL: 0.9 {RATIO} (ref 1.1–2.2)
ALP SERPL-CCNC: 100 U/L (ref 45–117)
ALT SERPL-CCNC: 22 U/L (ref 12–78)
ANION GAP SERPL CALC-SCNC: 1 MMOL/L (ref 5–15)
AST SERPL-CCNC: 13 U/L (ref 15–37)
BASOPHILS # BLD: 0 K/UL (ref 0–0.1)
BASOPHILS NFR BLD: 0 % (ref 0–1)
BILIRUB SERPL-MCNC: 0.4 MG/DL (ref 0.2–1)
BLASTS NFR BLD MANUAL: 0 %
BUN SERPL-MCNC: 13 MG/DL (ref 6–20)
BUN/CREAT SERPL: 21 (ref 12–20)
CALCIUM SERPL-MCNC: 8.5 MG/DL (ref 8.5–10.1)
CHLORIDE SERPL-SCNC: 108 MMOL/L (ref 97–108)
CO2 SERPL-SCNC: 28 MMOL/L (ref 21–32)
CREAT SERPL-MCNC: 0.61 MG/DL (ref 0.7–1.3)
DIFFERENTIAL METHOD BLD: NORMAL
EOSINOPHIL # BLD: 0.1 K/UL (ref 0–0.4)
EOSINOPHIL NFR BLD: 2 % (ref 0–7)
ERYTHROCYTE [DISTWIDTH] IN BLOOD BY AUTOMATED COUNT: 13.2 % (ref 11.5–14.5)
GLOBULIN SER CALC-MCNC: 3.5 G/DL (ref 2–4)
GLUCOSE SERPL-MCNC: 86 MG/DL (ref 65–100)
HCT VFR BLD AUTO: 42.4 % (ref 36.6–50.3)
HGB BLD-MCNC: 14 G/DL (ref 12.1–17)
IMM GRANULOCYTES # BLD AUTO: 0 K/UL
IMM GRANULOCYTES NFR BLD AUTO: 0 %
LYMPHOCYTES # BLD: 1.4 K/UL (ref 0.8–3.5)
LYMPHOCYTES NFR BLD: 21 % (ref 12–49)
MCH RBC QN AUTO: 29.9 PG (ref 26–34)
MCHC RBC AUTO-ENTMCNC: 33 G/DL (ref 30–36.5)
MCV RBC AUTO: 90.6 FL (ref 80–99)
METAMYELOCYTES NFR BLD MANUAL: 0 %
MONOCYTES # BLD: 0.5 K/UL (ref 0–1)
MONOCYTES NFR BLD: 7 % (ref 5–13)
MYELOCYTES NFR BLD MANUAL: 0 %
NEUTS BAND NFR BLD MANUAL: 0 % (ref 0–6)
NEUTS SEG # BLD: 4.7 K/UL (ref 1.8–8)
NEUTS SEG NFR BLD: 70 % (ref 32–75)
NRBC # BLD: 0 K/UL (ref 0–0.01)
NRBC BLD-RTO: 0 PER 100 WBC
OTHER CELLS NFR BLD MANUAL: 0 %
PLATELET # BLD AUTO: 201 K/UL (ref 150–400)
PMV BLD AUTO: 10.1 FL (ref 8.9–12.9)
POTASSIUM SERPL-SCNC: 4.2 MMOL/L (ref 3.5–5.1)
PROMYELOCYTES NFR BLD MANUAL: 0 %
PROT SERPL-MCNC: 6.7 G/DL (ref 6.4–8.2)
RBC # BLD AUTO: 4.68 M/UL (ref 4.1–5.7)
RBC MORPH BLD: NORMAL
SODIUM SERPL-SCNC: 137 MMOL/L (ref 136–145)
WBC # BLD AUTO: 6.7 K/UL (ref 4.1–11.1)

## 2022-03-03 PROCEDURE — 85027 COMPLETE CBC AUTOMATED: CPT

## 2022-03-03 PROCEDURE — 80053 COMPREHEN METABOLIC PANEL: CPT

## 2022-03-03 RX ORDER — SODIUM CHLORIDE, SODIUM LACTATE, POTASSIUM CHLORIDE, CALCIUM CHLORIDE 600; 310; 30; 20 MG/100ML; MG/100ML; MG/100ML; MG/100ML
25 INJECTION, SOLUTION INTRAVENOUS CONTINUOUS
Status: CANCELLED | OUTPATIENT
Start: 2022-03-10

## 2022-03-03 NOTE — PERIOP NOTES
[facility]  Preoperative Instructions        Surgery Date 03/10/22         Time of Arrival to be called  Contact # [phone] home  1. On the day of your surgery, please report to the Surgical Services Registration Desk and sign in at your designated time. The Surgery Center is located to the right of the Emergency Room. 2. You must have someone with you to drive you home. You should not drive a car for 24 hours following surgery. Please make arrangements for a friend or family member to stay with you for the first 24 hours after your surgery. 3. Do not have anything to eat or drink (including water, gum, mints, coffee, juice) after midnight ?03/09/22    . ? This may not apply to medications prescribed by your physician. ?(Please note below the special instructions with medications to take the morning of your procedure.)    4. We recommend you do not drink any alcoholic beverages for 24 hours before and after your surgery. 5. Contact your surgeons office for instructions on the following medications: non-steroidal anti-inflammatory drugs (i.e. Advil, Aleve), vitamins, and supplements. (Some surgeons will want you to stop these medications prior to surgery and others may allow you to take them)  **If you are currently taking Plavix, Coumadin, Aspirin and/or other blood-thinning agents, contact your surgeon for instructions. ** Your surgeon will partner with the physician prescribing these medications to determine if it is safe to stop or if you need to continue taking. Please do not stop taking these medications without instructions from your surgeon    6. Wear comfortable clothes. Wear glasses instead of contacts. Do not bring any money or jewelry. Please bring picture ID, insurance card, and any prearranged co-payment or hospital payment. Do not wear make-up, particularly mascara the morning of your surgery.   Do not wear nail polish, particularly if you are having foot /hand surgery. Wear your hair loose or down, no ponytails, buns, huy pins or clips. All body piercings must be removed. Please shower with antibacterial soap for three consecutive days before and on the morning of surgery, but do not apply any lotions, powders or deodorants after the shower on the day of surgery. Please use a fresh towels after each shower. Please sleep in clean clothes and change bed linens the night before surgery. Please do not shave for 48 hours prior to surgery. Shaving of the face is acceptable. 7. You should understand that if you do not follow these instructions your surgery may be cancelled. If your physical condition changes (I.e. fever, cold or flu) please contact your surgeon as soon as possible. 8. It is important that you be on time. If a situation occurs where you may be late, please call [phone] (OR Holding Area). 9. If you have any questions and or problems, please call [phone] (Pre-admission Testing). 10. Your surgery time may be subject to change. You will receive a phone call the evening prior if your time changes. 11.  If having outpatient surgery, you must have someone to drive you here, stay with you during the duration of your stay, and to drive you home at time of discharge. 12.  Due to current COVID restrictions, only ONE adult may accompany you the day of the procedure. We have limited seating available. If our waiting room is at capacity, your ride may be asked to remain in their vehicle. No children are allowed in the waiting room  Special Instructions:   Stop vitamins /supplements per surgeon  TAKE ALL MEDICATIONS DAY OF SURGERY EXCEPT:no exception      I understand a pre-operative phone call will be made to verify my surgery time. In the event that I am not available, I give permission for a message to be left on my answering service and/or with another person?   yes          ___________________      __________   _________ (Signature of Patient)             (Witness)                (Date and Time)

## 2022-03-03 NOTE — PERIOP NOTES
[facility]  Preoperative Instructions        Surgery Date 03/10/22          Time of Arrival to be called with time    1. On the day of your surgery, please report to the Surgical Services Registration Desk and sign in at your designated time. The Surgery Center is located to the right of the Emergency Room. 2. You must have someone with you to drive you home. You should not drive a car for 24 hours following surgery. Please make arrangements for a friend or family member to stay with you for the first 24 hours after your surgery. 3. Do not have anything to eat or drink (including water, gum, mints, coffee, juice) after midnight ?03/09/22  . ? This may not apply to medications prescribed by your physician. ?(Please note below the special instructions with medications to take the morning of your procedure.)    4. We recommend you do not drink any alcoholic beverages for 24 hours before and after your surgery. 5. Contact your surgeons office for instructions on the following medications: non-steroidal anti-inflammatory drugs (i.e. Advil, Aleve), vitamins, and supplements. (Some surgeons will want you to stop these medications prior to surgery and others may allow you to take them)  **If you are currently taking Plavix, Coumadin, Aspirin and/or other blood-thinning agents, contact your surgeon for instructions. ** Your surgeon will partner with the physician prescribing these medications to determine if it is safe to stop or if you need to continue taking. Please do not stop taking these medications without instructions from your surgeon    6. Wear comfortable clothes. Wear glasses instead of contacts. Do not bring any money or jewelry. Please bring picture ID, insurance card, and any prearranged co-payment or hospital payment. Do not wear make-up, particularly mascara the morning of your surgery. Do not wear nail polish, particularly if you are having foot /hand surgery.   Wear your hair loose or down, no ponytails, buns, huy pins or clips. All body piercings must be removed. Please shower with antibacterial soap for three consecutive days before and on the morning of surgery, but do not apply any lotions, powders or deodorants after the shower on the day of surgery. Please use a fresh towels after each shower. Please sleep in clean clothes and change bed linens the night before surgery. Please do not shave for 48 hours prior to surgery. Shaving of the face is acceptable. 7. You should understand that if you do not follow these instructions your surgery may be cancelled. If your physical condition changes (I.e. fever, cold or flu) please contact your surgeon as soon as possible. 8. It is important that you be on time. If a situation occurs where you may be late, please call [phone] (OR Holding Area). 9. If you have any questions and or problems, please call [phone] (Pre-admission Testing). 10. Your surgery time may be subject to change. You will receive a phone call the evening prior if your time changes. 11.  If having outpatient surgery, you must have someone to drive you here, stay with you during the duration of your stay, and to drive you home at time of discharge. 12.  Due to current COVID restrictions, only ONE adult may accompany you the day of the procedure. We have limited seating available. If our waiting room is at capacity, your ride may be asked to remain in their vehicle. No children are allowed in the waiting room  Special Instructions:     TAKE ALL MEDICATIONS DAY OF SURGERY EXCEPT:      I understand a pre-operative phone call will be made to verify my surgery time. In the event that I am not available, I give permission for a message to be left on my answering service and/or with another person?   yes          ___________________      __________   _________    (Signature of Patient)             (Witness)                (Date and Time)

## 2022-03-04 RX ORDER — CEFAZOLIN SODIUM 1 G/3ML
2 INJECTION, POWDER, FOR SOLUTION INTRAMUSCULAR; INTRAVENOUS ONCE
Status: CANCELLED | OUTPATIENT
Start: 2022-03-10 | End: 2022-03-10

## 2022-03-10 ENCOUNTER — ANESTHESIA (OUTPATIENT)
Dept: SURGERY | Age: 82
End: 2022-03-10
Payer: MEDICARE

## 2022-03-10 ENCOUNTER — HOSPITAL ENCOUNTER (OUTPATIENT)
Age: 82
Setting detail: OUTPATIENT SURGERY
Discharge: HOME | End: 2022-03-10
Attending: PODIATRIST | Admitting: PODIATRIST
Payer: MEDICARE

## 2022-03-10 ENCOUNTER — ANESTHESIA EVENT (OUTPATIENT)
Dept: SURGERY | Age: 82
End: 2022-03-10
Payer: MEDICARE

## 2022-03-10 VITALS
BODY MASS INDEX: 29.12 KG/M2 | OXYGEN SATURATION: 93 % | HEIGHT: 66 IN | DIASTOLIC BLOOD PRESSURE: 44 MMHG | HEART RATE: 63 BPM | RESPIRATION RATE: 13 BRPM | SYSTOLIC BLOOD PRESSURE: 114 MMHG | WEIGHT: 181.22 LBS | TEMPERATURE: 98.9 F

## 2022-03-10 DIAGNOSIS — G89.18 POST-OPERATIVE PAIN: Primary | ICD-10-CM

## 2022-03-10 PROCEDURE — 74011250636 HC RX REV CODE- 250/636: Performed by: NURSE ANESTHETIST, CERTIFIED REGISTERED

## 2022-03-10 PROCEDURE — 77030006788 HC BLD SAW OSC STRY -B: Performed by: PODIATRIST

## 2022-03-10 PROCEDURE — 77030010509 HC AIRWY LMA MSK TELE -A: Performed by: ANESTHESIOLOGY

## 2022-03-10 PROCEDURE — 74011000250 HC RX REV CODE- 250: Performed by: PODIATRIST

## 2022-03-10 PROCEDURE — 76210000016 HC OR PH I REC 1 TO 1.5 HR: Performed by: PODIATRIST

## 2022-03-10 PROCEDURE — 74011250636 HC RX REV CODE- 250/636: Performed by: ANESTHESIOLOGY

## 2022-03-10 PROCEDURE — 74011250637 HC RX REV CODE- 250/637: Performed by: PODIATRIST

## 2022-03-10 PROCEDURE — 76060000034 HC ANESTHESIA 1.5 TO 2 HR: Performed by: PODIATRIST

## 2022-03-10 PROCEDURE — 77030031139 HC SUT VCRL2 J&J -A: Performed by: PODIATRIST

## 2022-03-10 PROCEDURE — 74011250636 HC RX REV CODE- 250/636: Performed by: PODIATRIST

## 2022-03-10 PROCEDURE — 74011000250 HC RX REV CODE- 250: Performed by: NURSE ANESTHETIST, CERTIFIED REGISTERED

## 2022-03-10 PROCEDURE — 2709999900 HC NON-CHARGEABLE SUPPLY: Performed by: PODIATRIST

## 2022-03-10 PROCEDURE — 77030000032 HC CUF TRNQT ZIMM -B: Performed by: PODIATRIST

## 2022-03-10 PROCEDURE — 77030002916 HC SUT ETHLN J&J -A: Performed by: PODIATRIST

## 2022-03-10 PROCEDURE — C1713 ANCHOR/SCREW BN/BN,TIS/BN: HCPCS | Performed by: PODIATRIST

## 2022-03-10 PROCEDURE — 76210000020 HC REC RM PH II FIRST 0.5 HR: Performed by: PODIATRIST

## 2022-03-10 PROCEDURE — 76010000153 HC OR TIME 1.5 TO 2 HR: Performed by: PODIATRIST

## 2022-03-10 DEVICE — CANNULATED SCREW
Type: IMPLANTABLE DEVICE | Site: TOE | Status: FUNCTIONAL
Brand: ASNIS

## 2022-03-10 RX ORDER — PROPOFOL 10 MG/ML
INJECTION, EMULSION INTRAVENOUS
Status: DISCONTINUED | OUTPATIENT
Start: 2022-03-10 | End: 2022-03-10 | Stop reason: HOSPADM

## 2022-03-10 RX ORDER — OXYCODONE HYDROCHLORIDE 5 MG/1
5 TABLET ORAL AS NEEDED
Status: DISCONTINUED | OUTPATIENT
Start: 2022-03-10 | End: 2022-03-10 | Stop reason: HOSPADM

## 2022-03-10 RX ORDER — CEFAZOLIN SODIUM 1 G/3ML
2 INJECTION, POWDER, FOR SOLUTION INTRAMUSCULAR; INTRAVENOUS ONCE
Status: DISCONTINUED | OUTPATIENT
Start: 2022-03-10 | End: 2022-03-10 | Stop reason: CLARIF

## 2022-03-10 RX ORDER — DEXMEDETOMIDINE HYDROCHLORIDE 100 UG/ML
INJECTION, SOLUTION INTRAVENOUS AS NEEDED
Status: DISCONTINUED | OUTPATIENT
Start: 2022-03-10 | End: 2022-03-10 | Stop reason: HOSPADM

## 2022-03-10 RX ORDER — LIDOCAINE HYDROCHLORIDE 20 MG/ML
INJECTION, SOLUTION EPIDURAL; INFILTRATION; INTRACAUDAL; PERINEURAL AS NEEDED
Status: DISCONTINUED | OUTPATIENT
Start: 2022-03-10 | End: 2022-03-10 | Stop reason: HOSPADM

## 2022-03-10 RX ORDER — EPHEDRINE SULFATE/0.9% NACL/PF 50 MG/5 ML
SYRINGE (ML) INTRAVENOUS AS NEEDED
Status: DISCONTINUED | OUTPATIENT
Start: 2022-03-10 | End: 2022-03-10 | Stop reason: HOSPADM

## 2022-03-10 RX ORDER — HYDROCODONE BITARTRATE AND ACETAMINOPHEN 7.5; 325 MG/1; MG/1
1 TABLET ORAL
Qty: 28 TABLET | Refills: 0 | Status: SHIPPED | OUTPATIENT
Start: 2022-03-10 | End: 2022-03-22

## 2022-03-10 RX ORDER — ONDANSETRON 2 MG/ML
INJECTION INTRAMUSCULAR; INTRAVENOUS AS NEEDED
Status: DISCONTINUED | OUTPATIENT
Start: 2022-03-10 | End: 2022-03-10 | Stop reason: HOSPADM

## 2022-03-10 RX ORDER — SODIUM CHLORIDE, SODIUM LACTATE, POTASSIUM CHLORIDE, CALCIUM CHLORIDE 600; 310; 30; 20 MG/100ML; MG/100ML; MG/100ML; MG/100ML
25 INJECTION, SOLUTION INTRAVENOUS CONTINUOUS
Status: DISCONTINUED | OUTPATIENT
Start: 2022-03-10 | End: 2022-03-10 | Stop reason: HOSPADM

## 2022-03-10 RX ORDER — FENTANYL CITRATE 50 UG/ML
25 INJECTION, SOLUTION INTRAMUSCULAR; INTRAVENOUS
Status: DISCONTINUED | OUTPATIENT
Start: 2022-03-10 | End: 2022-03-10 | Stop reason: HOSPADM

## 2022-03-10 RX ORDER — PHENYLEPHRINE HCL IN 0.9% NACL 0.4MG/10ML
SYRINGE (ML) INTRAVENOUS AS NEEDED
Status: DISCONTINUED | OUTPATIENT
Start: 2022-03-10 | End: 2022-03-10 | Stop reason: HOSPADM

## 2022-03-10 RX ORDER — GLYCOPYRROLATE 0.2 MG/ML
INJECTION INTRAMUSCULAR; INTRAVENOUS AS NEEDED
Status: DISCONTINUED | OUTPATIENT
Start: 2022-03-10 | End: 2022-03-10 | Stop reason: HOSPADM

## 2022-03-10 RX ORDER — BUPIVACAINE HYDROCHLORIDE 5 MG/ML
INJECTION, SOLUTION EPIDURAL; INTRACAUDAL AS NEEDED
Status: DISCONTINUED | OUTPATIENT
Start: 2022-03-10 | End: 2022-03-10 | Stop reason: HOSPADM

## 2022-03-10 RX ORDER — FENTANYL CITRATE 50 UG/ML
INJECTION, SOLUTION INTRAMUSCULAR; INTRAVENOUS AS NEEDED
Status: DISCONTINUED | OUTPATIENT
Start: 2022-03-10 | End: 2022-03-10 | Stop reason: HOSPADM

## 2022-03-10 RX ORDER — PROPOFOL 10 MG/ML
INJECTION, EMULSION INTRAVENOUS AS NEEDED
Status: DISCONTINUED | OUTPATIENT
Start: 2022-03-10 | End: 2022-03-10 | Stop reason: HOSPADM

## 2022-03-10 RX ORDER — HYDROMORPHONE HYDROCHLORIDE 1 MG/ML
0.2 INJECTION, SOLUTION INTRAMUSCULAR; INTRAVENOUS; SUBCUTANEOUS
Status: DISCONTINUED | OUTPATIENT
Start: 2022-03-10 | End: 2022-03-10 | Stop reason: HOSPADM

## 2022-03-10 RX ORDER — ONDANSETRON 2 MG/ML
4 INJECTION INTRAMUSCULAR; INTRAVENOUS AS NEEDED
Status: DISCONTINUED | OUTPATIENT
Start: 2022-03-10 | End: 2022-03-10 | Stop reason: HOSPADM

## 2022-03-10 RX ADMIN — DEXMEDETOMIDINE HYDROCHLORIDE 4 MCG: 100 INJECTION, SOLUTION, CONCENTRATE INTRAVENOUS at 13:16

## 2022-03-10 RX ADMIN — Medication 80 MCG: at 13:19

## 2022-03-10 RX ADMIN — Medication 10 MG: at 13:33

## 2022-03-10 RX ADMIN — SODIUM CHLORIDE, POTASSIUM CHLORIDE, SODIUM LACTATE AND CALCIUM CHLORIDE 25 ML/HR: 600; 310; 30; 20 INJECTION, SOLUTION INTRAVENOUS at 11:59

## 2022-03-10 RX ADMIN — GLYCOPYRROLATE 0.1 MG: 0.2 INJECTION, SOLUTION INTRAMUSCULAR; INTRAVENOUS at 12:55

## 2022-03-10 RX ADMIN — PROPOFOL 75 MCG/KG/MIN: 10 INJECTION, EMULSION INTRAVENOUS at 12:55

## 2022-03-10 RX ADMIN — DEXMEDETOMIDINE HYDROCHLORIDE 4 MCG: 100 INJECTION, SOLUTION, CONCENTRATE INTRAVENOUS at 13:10

## 2022-03-10 RX ADMIN — FENTANYL CITRATE 50 MCG: 50 INJECTION, SOLUTION INTRAMUSCULAR; INTRAVENOUS at 12:48

## 2022-03-10 RX ADMIN — ONDANSETRON HYDROCHLORIDE 4 MG: 2 INJECTION, SOLUTION INTRAMUSCULAR; INTRAVENOUS at 13:57

## 2022-03-10 RX ADMIN — WATER 2 G: 1 INJECTION INTRAMUSCULAR; INTRAVENOUS; SUBCUTANEOUS at 12:55

## 2022-03-10 RX ADMIN — Medication 10 MG: at 13:00

## 2022-03-10 RX ADMIN — Medication 3 AMPULE: at 11:59

## 2022-03-10 RX ADMIN — PROPOFOL 20 MG: 10 INJECTION, EMULSION INTRAVENOUS at 12:55

## 2022-03-10 RX ADMIN — DEXMEDETOMIDINE HYDROCHLORIDE 4 MCG: 100 INJECTION, SOLUTION, CONCENTRATE INTRAVENOUS at 13:39

## 2022-03-10 RX ADMIN — DEXMEDETOMIDINE HYDROCHLORIDE 4 MCG: 100 INJECTION, SOLUTION, CONCENTRATE INTRAVENOUS at 13:37

## 2022-03-10 RX ADMIN — DEXMEDETOMIDINE HYDROCHLORIDE 8 MCG: 100 INJECTION, SOLUTION, CONCENTRATE INTRAVENOUS at 13:08

## 2022-03-10 RX ADMIN — DEXMEDETOMIDINE HYDROCHLORIDE 4 MCG: 100 INJECTION, SOLUTION, CONCENTRATE INTRAVENOUS at 13:27

## 2022-03-10 RX ADMIN — DEXMEDETOMIDINE HYDROCHLORIDE 4 MCG: 100 INJECTION, SOLUTION, CONCENTRATE INTRAVENOUS at 13:12

## 2022-03-10 RX ADMIN — Medication 10 MG: at 13:54

## 2022-03-10 RX ADMIN — FENTANYL CITRATE 50 MCG: 50 INJECTION, SOLUTION INTRAMUSCULAR; INTRAVENOUS at 13:00

## 2022-03-10 RX ADMIN — DEXMEDETOMIDINE HYDROCHLORIDE 4 MCG: 100 INJECTION, SOLUTION, CONCENTRATE INTRAVENOUS at 13:33

## 2022-03-10 RX ADMIN — PROPOFOL 100 MG: 10 INJECTION, EMULSION INTRAVENOUS at 13:46

## 2022-03-10 RX ADMIN — LIDOCAINE HYDROCHLORIDE 40 MG: 20 INJECTION, SOLUTION EPIDURAL; INFILTRATION; INTRACAUDAL; PERINEURAL at 12:55

## 2022-03-10 RX ADMIN — DEXMEDETOMIDINE HYDROCHLORIDE 4 MCG: 100 INJECTION, SOLUTION, CONCENTRATE INTRAVENOUS at 13:11

## 2022-03-10 NOTE — DISCHARGE INSTRUCTIONS
1. Call the office for any post op problems including but not limited to: uncontrolled pain or bleeding, fever, nausea, vomiting. 2. Keep the bandage clean and dry, do not remove it. Call the office if it becomes wet or dirty. 3. Take the pain medication (Hydrocodone) as needed but no more than prescribed. 4. Use the surgical boot for walking, but you may take it off when seated. 5. Use a shower bag or other water proof bag to keep the foot dry while showering. 6. Limit walking to necessary activity until instructed otherwise. 7. Do not drive until instructed. 8. Apply ice to the right foot for 10-20 minutes several times daily. Do not sleep with ice on the foot. TO PREVENT AN INFECTION      1. [address] YOUR HANDS     To prevent infection, good handwashing is the most important thing you or your caregiver can do.  Wash your hands with soap and water or use the hand  we gave you before you touch any wounds. 2. SHOWER     Use the antibacterial soap we gave you when you take a shower.  Shower with this soap until your wounds are healed.  To reach all areas of your body, you may need someone to help you.  Dont forget to clean your belly button with every shower. 3.  USE CLEAN SHEETS     Use freshly cleaned sheets on your bed after surgery.  To keep the surgery site clean, do not allow pets to sleep with you while your wound is still healing. DISCHARGE SUMMARY from Nurse    PATIENT INSTRUCTIONS:    After general anesthesia or intravenous sedation, for 24 hours or while taking prescription Narcotics:  · Limit your activities  · Do not drive and operate hazardous machinery  · Do not make important personal or business decisions  · Do  not drink alcoholic beverages  · If you have not urinated within 8 hours after discharge, please contact your surgeon on call.     Report the following to your surgeon:  · Excessive pain, swelling, redness or odor of or around the surgical area  · Temperature over 100.5  · Nausea and vomiting lasting longer than 4 hours or if unable to take medications  · Any signs of decreased circulation or nerve impairment to extremity: change in color, persistent  numbness, tingling, coldness or increase pain  · Any questions      Patient Education      Hydrocodone/Acetaminophen (Vicodin, Norco, Lortab) - (By mouth)   Why this medicine is used:   Treats pain. Contact a nurse or doctor right away if you have:  · Blistering, peeling, red skin rash  · Fast or slow heartbeat, shallow breathing, blue lips, fingernails, or skin  · Anxiety, restlessness, muscle spasms, twitching, seeing or hearing things that are not there  · Dark urine or pale stools, yellow skin or eyes  · Extreme weakness, sweating, seizures, cold or clammy skin  · Lightheadedness, dizziness, fainting, fever, sweating     Common side effects:  · Constipation, nausea, vomiting, loss of appetite, stomach pain  · Tiredness or sleepiness  © 2017 [facility] Information is for End User's use only and may not be sold, redistributed or otherwise used for commercial purposes.

## 2022-03-10 NOTE — ANESTHESIA PREPROCEDURE EVALUATION
Relevant Problems   No relevant active problems       Anesthetic History   No history of anesthetic complications            Review of Systems / Medical History  Patient summary reviewed, nursing notes reviewed and pertinent labs reviewed    Pulmonary                Comments: Former smoker.  Quit 2000. 30 pack year hx   Neuro/Psych   Within defined limits        Pertinent negatives: No seizures and CVA   Cardiovascular    Hypertension        Dysrhythmias   CAD      Comments: EKG (02/2022): NSR, PVCs   GI/Hepatic/Renal  Within defined limits           Pertinent negatives: No renal disease   Endo/Other        Obesity  Pertinent negatives: No diabetes   Other Findings              Physical Exam    Airway  Mallampati: II  TM Distance: > 6 cm  Neck ROM: normal range of motion   Mouth opening: Normal     Cardiovascular  Regular rate and rhythm,  S1 and S2 normal,  no murmur, click, rub, or gallop             Dental    Dentition: Caps/crowns     Pulmonary  Breath sounds clear to auscultation               Abdominal  GI exam deferred       Other Findings            Anesthetic Plan    ASA: 3  Anesthesia type: MAC          Induction: Intravenous  Anesthetic plan and risks discussed with: Patient

## 2022-03-10 NOTE — BRIEF OP NOTE
Brief Postoperative Note    Patient: Versie Shone  YOB: 1940  MRN: [id number]    Date of Procedure: 3/10/2022     Pre-Op Diagnosis: 1. OTHER DEFORMITY FOOT RIGHT THIRD METATARSAL. 2. RETAINED SCREW RIGHT THIRD METATARSAL    Post-Op Diagnosis: Same as preoperative diagnosis. Procedure(s):  1. SHORTNENING OSTEOTOMY RIGHT THIRD METATARSAL WITH 2. REMOVAL OF PREVIOUS SCREW    Surgeon(s):  Belock, Merlinda Krauss, DPM    Surgical Assistant: Surg Asst-1: Emilee Allen    Anesthesia: General     Estimated Blood Loss (mL): Minimal    Complications: None    Specimens: * No specimens in log *     Implants:   Implant Name Type Inv.  Item Serial No.  Lot No. LRB No. Used Action   SCREW BNE L12MM DIA2MM THRD L5MM NONSTERILE JOSE ENRIQUE SANA HND FT - SN/A  SCREW BNE L12MM DIA2MM THRD L5MM NONSTERILE JOSE ENRIQUE SANA HND FT N/A [employer] HOWM[employer]WD N/A Right 2 Implanted   trilliant 2.0mm screw   NA [employer] NA Right 1 Explanted       Drains: * No LDAs found *    Findings: n/a    Electronically Signed by Kathy Garzon DPM on 3/10/2022 at 2:36 PM

## 2022-03-10 NOTE — PERIOP NOTES
Handoff Report from Operating Room to PACU    Report received from [id number] regarding Gage Haskins. Surgeon(s):  Joelle Griffin DPM  And Procedure(s) (LRB):  SHORTNENING OSTEOTOMY RIGHT THIRD METATARSAL WITH REMOVAL OF PREVIOUS SCREW (Right)  confirmed   with allergies and dressings discussed. Anesthesia type, drugs, patient history, complications, estimated blood loss, vital signs, intake and output, and last pain medication, lines and temperature were reviewed. 12- Dr. Myers Morning at bedside. Boot placed on stretcher. 1540- Discharge instructions given and reviewed with patient and patient's partner. Questions answered. PIV and telemetry removed. Patient escorted to surgical entrance via wheelchair with all personal belongings where joe awaiting to pick patient up.

## 2022-03-11 NOTE — OP NOTES
[address]  OPERATIVE REPORT    Name:  David May  MR#:  [id number]  :  1940  ACCOUNT #:  [de-identified]  DATE OF SERVICE:  03/10/2022    PREOPERATIVE DIAGNOSIS:  Deformity right third metatarsal    POSTOPERATIVE DIAGNOSIS:  Same    PROCEDURE PERFORMED:  Osteotomy right third metatarsal    SURGEON:  Carlos Campa DPM    ASSISTANT:  Not applicable. ANESTHESIA:  IV sedation with local, which was later converted to general with LMA. COMPLICATIONS:  None. SPECIMENS REMOVED:  None. IMPLANTS:  [employer]way Asnis 2.0 x 12 mm partially threaded cannulated screws x2. ESTIMATED BLOOD LOSS:  Minimal.    PROCEDURE DETAIL:  The patient was brought into the operating room and placed supine upon the OR table. After administration of IV sedation, I injected the right foot with 10 mL of 0.5% plain Marcaine on both the medial and lateral sides of the shaft of the third metatarsal in the area of the dorsal and plantar common digital nerves. The foot was then prepped and draped in the usual sterile technique. A time-out was observed. The right lower extremity was elevated for 3 minutes before the inflation of a tourniquet around the ankle to a pressure of 250 mmHg. Attention was directed to the dorsum of the right third metatarsophalangeal joint. A linear incision was made from proximal to distal and deepened through the subcutaneous tissue with a combination of sharp and blunt technique. Care was taken to identify and retract vital structures. Bleeding vessels were cauterized. The patient was moving his foot moderate amount at this time, but the surgical assistant was able to keep the foot in a relatively still position for the moment. The joint capsule and adjacent periosteum were identified and incised from proximal to distal and reflected medially and laterally. The old screw was barely visible in the third metatarsal head from his previous surgery.   A small rongeur was used to clear away bony overgrowth and then utilize the appropriate screwdriver to back the screw out in toto. A power microsagittal saw was then used to begin the osteotomy beginning at the dorsal distal aspect of the third metatarsal head and neck passing plantarly and proximally through the head, neck and shaft. The patient began to move his foot uncontrollably just at the start of this osteotomy and the dorsal aspect of the head of the third metatarsal was unintentionally removed during this time. The patient was then converted to general anesthesia with an LMA. I was able to complete surgery as planned, however as there was enough of the metatarsal remaining to complete the osteotomy from dorsal distal to plantar proximal through the head, neck and shaft of the third metatarsal.  Metatarsal head was transposed proximally by approximately 6-7 mm before being fixated onto the shaft utilizing two Hotelicopteris 2.0 x 12 mm partially threaded cannulated screws. Appropriate fixation and reduction of deformity was verified on fluoroscopy. The surgical site was then flushed well with sterile irrigation. Closure was carried out utilizing 4-0 Vicryl for closure of deep tissue including the joint capsule and 4-0 nylon for closure of the skin. The foot was cleaned and dried and then wrapped in dry sterile dressings incorporating Adaptic over the surgical incision. The tourniquet was released and capillary refill was less than 3 seconds to all toes. The patient was then transported to the recovery room with vital signs stable and vascular status intact. The patient will remain until deemed stable for discharge home. He has written and verbal postoperative instructions as well as appropriate postoperative pain medication. The patient will follow up in my office in 7-10 days.         URSULA [email or web address]  D:  03/10/2022 14:47  T:  03/10/2022 23:29  JOB #:  [id number]

## 2022-03-16 ENCOUNTER — APPOINTMENT (OUTPATIENT)
Dept: GENERAL RADIOLOGY | Age: 82
DRG: 337 | End: 2022-03-16
Attending: STUDENT IN AN ORGANIZED HEALTH CARE EDUCATION/TRAINING PROGRAM
Payer: MEDICARE

## 2022-03-16 ENCOUNTER — APPOINTMENT (OUTPATIENT)
Dept: CT IMAGING | Age: 82
DRG: 337 | End: 2022-03-16
Attending: STUDENT IN AN ORGANIZED HEALTH CARE EDUCATION/TRAINING PROGRAM
Payer: MEDICARE

## 2022-03-16 ENCOUNTER — HOSPITAL ENCOUNTER (INPATIENT)
Age: 82
LOS: 6 days | Discharge: HOME | DRG: 337 | End: 2022-03-22
Attending: STUDENT IN AN ORGANIZED HEALTH CARE EDUCATION/TRAINING PROGRAM | Admitting: SURGERY
Payer: MEDICARE

## 2022-03-16 DIAGNOSIS — K56.609 SMALL BOWEL OBSTRUCTION (HCC): ICD-10-CM

## 2022-03-16 DIAGNOSIS — K56.609 SBO (SMALL BOWEL OBSTRUCTION) (HCC): Primary | ICD-10-CM

## 2022-03-16 LAB
ALBUMIN SERPL-MCNC: 3.7 G/DL (ref 3.5–5)
ALBUMIN/GLOB SERPL: 0.9 {RATIO} (ref 1.1–2.2)
ALP SERPL-CCNC: 119 U/L (ref 45–117)
ALT SERPL-CCNC: 23 U/L (ref 12–78)
ANION GAP SERPL CALC-SCNC: 7 MMOL/L (ref 5–15)
APPEARANCE UR: ABNORMAL
AST SERPL-CCNC: 21 U/L (ref 15–37)
ATRIAL RATE: 67 BPM
BACTERIA URNS QL MICRO: NEGATIVE /HPF
BASOPHILS # BLD: 0.1 K/UL (ref 0–0.1)
BASOPHILS NFR BLD: 0 % (ref 0–1)
BILIRUB SERPL-MCNC: 0.4 MG/DL (ref 0.2–1)
BILIRUB UR QL: NEGATIVE
BUN SERPL-MCNC: 14 MG/DL (ref 6–20)
BUN/CREAT SERPL: 17 (ref 12–20)
CALCIUM SERPL-MCNC: 9 MG/DL (ref 8.5–10.1)
CALCULATED P AXIS, ECG09: 62 DEGREES
CALCULATED R AXIS, ECG10: -24 DEGREES
CALCULATED T AXIS, ECG11: 72 DEGREES
CHLORIDE SERPL-SCNC: 104 MMOL/L (ref 97–108)
CO2 SERPL-SCNC: 25 MMOL/L (ref 21–32)
COLOR UR: ABNORMAL
CREAT SERPL-MCNC: 0.83 MG/DL (ref 0.7–1.3)
DIAGNOSIS, 93000: NORMAL
DIFFERENTIAL METHOD BLD: ABNORMAL
EOSINOPHIL # BLD: 0 K/UL (ref 0–0.4)
EOSINOPHIL NFR BLD: 0 % (ref 0–7)
EPITH CASTS URNS QL MICRO: ABNORMAL /LPF
ERYTHROCYTE [DISTWIDTH] IN BLOOD BY AUTOMATED COUNT: 13.3 % (ref 11.5–14.5)
GLOBULIN SER CALC-MCNC: 3.9 G/DL (ref 2–4)
GLUCOSE SERPL-MCNC: 111 MG/DL (ref 65–100)
GLUCOSE UR STRIP.AUTO-MCNC: NEGATIVE MG/DL
HCT VFR BLD AUTO: 45.3 % (ref 36.6–50.3)
HGB BLD-MCNC: 15.2 G/DL (ref 12.1–17)
HGB UR QL STRIP: NEGATIVE
HYALINE CASTS URNS QL MICRO: >20 /LPF (ref 0–5)
IMM GRANULOCYTES # BLD AUTO: 0 K/UL (ref 0–0.04)
IMM GRANULOCYTES NFR BLD AUTO: 0 % (ref 0–0.5)
KETONES UR QL STRIP.AUTO: ABNORMAL MG/DL
LEUKOCYTE ESTERASE UR QL STRIP.AUTO: NEGATIVE
LIPASE SERPL-CCNC: 113 U/L (ref 73–393)
LYMPHOCYTES # BLD: 0.9 K/UL (ref 0.8–3.5)
LYMPHOCYTES NFR BLD: 6 % (ref 12–49)
MCH RBC QN AUTO: 29.6 PG (ref 26–34)
MCHC RBC AUTO-ENTMCNC: 33.6 G/DL (ref 30–36.5)
MCV RBC AUTO: 88.3 FL (ref 80–99)
MONOCYTES # BLD: 0.7 K/UL (ref 0–1)
MONOCYTES NFR BLD: 5 % (ref 5–13)
MUCOUS THREADS URNS QL MICRO: ABNORMAL /LPF
NEUTS SEG # BLD: 13.4 K/UL (ref 1.8–8)
NEUTS SEG NFR BLD: 89 % (ref 32–75)
NITRITE UR QL STRIP.AUTO: NEGATIVE
NRBC # BLD: 0 K/UL (ref 0–0.01)
NRBC BLD-RTO: 0 PER 100 WBC
P-R INTERVAL, ECG05: 178 MS
PH UR STRIP: 5 [PH] (ref 5–8)
PLATELET # BLD AUTO: 230 K/UL (ref 150–400)
PMV BLD AUTO: 10.4 FL (ref 8.9–12.9)
POTASSIUM SERPL-SCNC: 4.4 MMOL/L (ref 3.5–5.1)
PROT SERPL-MCNC: 7.6 G/DL (ref 6.4–8.2)
PROT UR STRIP-MCNC: ABNORMAL MG/DL
Q-T INTERVAL, ECG07: 404 MS
QRS DURATION, ECG06: 100 MS
QTC CALCULATION (BEZET), ECG08: 426 MS
RBC # BLD AUTO: 5.13 M/UL (ref 4.1–5.7)
RBC #/AREA URNS HPF: ABNORMAL /HPF (ref 0–5)
SODIUM SERPL-SCNC: 136 MMOL/L (ref 136–145)
SP GR UR REFRACTOMETRY: 1.03 (ref 1–1.03)
UROBILINOGEN UR QL STRIP.AUTO: 0.2 EU/DL (ref 0.2–1)
VENTRICULAR RATE, ECG03: 67 BPM
WBC # BLD AUTO: 15 K/UL (ref 4.1–11.1)
WBC URNS QL MICRO: ABNORMAL /HPF (ref 0–4)

## 2022-03-16 PROCEDURE — 74011250636 HC RX REV CODE- 250/636: Performed by: SURGERY

## 2022-03-16 PROCEDURE — 90471 IMMUNIZATION ADMIN: CPT

## 2022-03-16 PROCEDURE — 36415 COLL VENOUS BLD VENIPUNCTURE: CPT

## 2022-03-16 PROCEDURE — 96361 HYDRATE IV INFUSION ADD-ON: CPT

## 2022-03-16 PROCEDURE — 90715 TDAP VACCINE 7 YRS/> IM: CPT | Performed by: STUDENT IN AN ORGANIZED HEALTH CARE EDUCATION/TRAINING PROGRAM

## 2022-03-16 PROCEDURE — 93005 ELECTROCARDIOGRAM TRACING: CPT

## 2022-03-16 PROCEDURE — 81001 URINALYSIS AUTO W/SCOPE: CPT

## 2022-03-16 PROCEDURE — 74011000636 HC RX REV CODE- 636: Performed by: STUDENT IN AN ORGANIZED HEALTH CARE EDUCATION/TRAINING PROGRAM

## 2022-03-16 PROCEDURE — 99222 1ST HOSP IP/OBS MODERATE 55: CPT | Performed by: SURGERY

## 2022-03-16 PROCEDURE — 65270000029 HC RM PRIVATE

## 2022-03-16 PROCEDURE — 74011000250 HC RX REV CODE- 250: Performed by: SURGERY

## 2022-03-16 PROCEDURE — 80053 COMPREHEN METABOLIC PANEL: CPT

## 2022-03-16 PROCEDURE — 99285 EMERGENCY DEPT VISIT HI MDM: CPT

## 2022-03-16 PROCEDURE — 74011250636 HC RX REV CODE- 250/636: Performed by: STUDENT IN AN ORGANIZED HEALTH CARE EDUCATION/TRAINING PROGRAM

## 2022-03-16 PROCEDURE — 83690 ASSAY OF LIPASE: CPT

## 2022-03-16 PROCEDURE — 74177 CT ABD & PELVIS W/CONTRAST: CPT

## 2022-03-16 PROCEDURE — 74018 RADEX ABDOMEN 1 VIEW: CPT

## 2022-03-16 PROCEDURE — 96374 THER/PROPH/DIAG INJ IV PUSH: CPT

## 2022-03-16 PROCEDURE — 85025 COMPLETE CBC W/AUTO DIFF WBC: CPT

## 2022-03-16 RX ORDER — METRONIDAZOLE 500 MG/100ML
500 INJECTION, SOLUTION INTRAVENOUS
Status: COMPLETED | OUTPATIENT
Start: 2022-03-16 | End: 2022-03-16

## 2022-03-16 RX ORDER — ONDANSETRON 2 MG/ML
4 INJECTION INTRAMUSCULAR; INTRAVENOUS
Status: COMPLETED | OUTPATIENT
Start: 2022-03-16 | End: 2022-03-16

## 2022-03-16 RX ORDER — DIPHENHYDRAMINE HYDROCHLORIDE 50 MG/ML
25 INJECTION, SOLUTION INTRAMUSCULAR; INTRAVENOUS
Status: DISCONTINUED | OUTPATIENT
Start: 2022-03-16 | End: 2022-03-22 | Stop reason: HOSPADM

## 2022-03-16 RX ORDER — DEXTROSE, SODIUM CHLORIDE, AND POTASSIUM CHLORIDE 5; .45; .15 G/100ML; G/100ML; G/100ML
125 INJECTION INTRAVENOUS CONTINUOUS
Status: DISCONTINUED | OUTPATIENT
Start: 2022-03-16 | End: 2022-03-20

## 2022-03-16 RX ORDER — SODIUM CHLORIDE 0.9 % (FLUSH) 0.9 %
5-40 SYRINGE (ML) INJECTION EVERY 8 HOURS
Status: DISCONTINUED | OUTPATIENT
Start: 2022-03-16 | End: 2022-03-22 | Stop reason: HOSPADM

## 2022-03-16 RX ORDER — LORAZEPAM 2 MG/ML
1 INJECTION INTRAMUSCULAR
Status: DISCONTINUED | OUTPATIENT
Start: 2022-03-16 | End: 2022-03-22 | Stop reason: HOSPADM

## 2022-03-16 RX ORDER — CIPROFLOXACIN 2 MG/ML
400 INJECTION, SOLUTION INTRAVENOUS
Status: COMPLETED | OUTPATIENT
Start: 2022-03-16 | End: 2022-03-16

## 2022-03-16 RX ORDER — HYDRALAZINE HYDROCHLORIDE 20 MG/ML
10 INJECTION INTRAMUSCULAR; INTRAVENOUS
Status: DISCONTINUED | OUTPATIENT
Start: 2022-03-16 | End: 2022-03-22 | Stop reason: HOSPADM

## 2022-03-16 RX ORDER — NALOXONE HYDROCHLORIDE 0.4 MG/ML
0.4 INJECTION, SOLUTION INTRAMUSCULAR; INTRAVENOUS; SUBCUTANEOUS AS NEEDED
Status: DISCONTINUED | OUTPATIENT
Start: 2022-03-16 | End: 2022-03-22 | Stop reason: HOSPADM

## 2022-03-16 RX ORDER — SODIUM CHLORIDE 0.9 % (FLUSH) 0.9 %
5-40 SYRINGE (ML) INJECTION AS NEEDED
Status: DISCONTINUED | OUTPATIENT
Start: 2022-03-16 | End: 2022-03-22 | Stop reason: HOSPADM

## 2022-03-16 RX ORDER — ACETAMINOPHEN 325 MG/1
650 TABLET ORAL
Status: DISCONTINUED | OUTPATIENT
Start: 2022-03-16 | End: 2022-03-22 | Stop reason: HOSPADM

## 2022-03-16 RX ORDER — ONDANSETRON 2 MG/ML
4 INJECTION INTRAMUSCULAR; INTRAVENOUS
Status: DISCONTINUED | OUTPATIENT
Start: 2022-03-16 | End: 2022-03-22 | Stop reason: HOSPADM

## 2022-03-16 RX ORDER — MORPHINE SULFATE 2 MG/ML
2 INJECTION, SOLUTION INTRAMUSCULAR; INTRAVENOUS
Status: DISCONTINUED | OUTPATIENT
Start: 2022-03-16 | End: 2022-03-22 | Stop reason: HOSPADM

## 2022-03-16 RX ADMIN — CIPROFLOXACIN 400 MG: 2 INJECTION, SOLUTION INTRAVENOUS at 11:34

## 2022-03-16 RX ADMIN — SODIUM CHLORIDE, PRESERVATIVE FREE 10 ML: 5 INJECTION INTRAVENOUS at 14:00

## 2022-03-16 RX ADMIN — SODIUM CHLORIDE 1000 ML: 9 INJECTION, SOLUTION INTRAVENOUS at 08:56

## 2022-03-16 RX ADMIN — METRONIDAZOLE 500 MG: 500 INJECTION, SOLUTION INTRAVENOUS at 10:31

## 2022-03-16 RX ADMIN — LORAZEPAM 1 MG: 2 INJECTION INTRAMUSCULAR; INTRAVENOUS at 23:00

## 2022-03-16 RX ADMIN — TETANUS TOXOID, REDUCED DIPHTHERIA TOXOID AND ACELLULAR PERTUSSIS VACCINE, ADSORBED 0.5 ML: 5; 2.5; 8; 8; 2.5 SUSPENSION INTRAMUSCULAR at 08:58

## 2022-03-16 RX ADMIN — POTASSIUM CHLORIDE, DEXTROSE MONOHYDRATE AND SODIUM CHLORIDE 125 ML/HR: 150; 5; 450 INJECTION, SOLUTION INTRAVENOUS at 16:09

## 2022-03-16 RX ADMIN — SODIUM CHLORIDE, PRESERVATIVE FREE 10 ML: 5 INJECTION INTRAVENOUS at 23:01

## 2022-03-16 RX ADMIN — ONDANSETRON 4 MG: 2 INJECTION INTRAMUSCULAR; INTRAVENOUS at 09:01

## 2022-03-16 RX ADMIN — IOPAMIDOL 100 ML: 755 INJECTION, SOLUTION INTRAVENOUS at 09:31

## 2022-03-16 NOTE — H&P
Surgery Consult    Subjective:      Nallely Hughes is a [de-identified] y.o. male who is being seen for evaluation of abdominal pain. The pain is located in the suprapubic. Pain is described as dull and aching and measures 8/10 in intensity. Onset of pain was 1 day ago. Aggravating factors include movement, activity and pressure. Alleviating factors include none. Associated symptoms include nausea and vomiting. Patient was admitted for a SBO 5 weeks ago. He improved but states he never got back to baseline. Yesterday he got acutely worse so he came in this morning. Patient states he had a transanal colon/rectal surgery but no intra-abdominal surgeries. He has no history of IBD. He has a first cousin who suffers from Crohn's disease. Last colonoscopy was over 10 years ago.   He states it was normal at that time    Patient Active Problem List    Diagnosis Date Noted    SBO (small bowel obstruction) ([address] 2022    Small bowel obstruction ([address] 2022    COVID-19 2022     Past Medical History:   Diagnosis Date    CAD (coronary artery disease)     3 stents    COVID         Diverticulosis     Hammer toe 2019    Hammertoe of right foot     Hypertension     Macular degeneration     [facility]    Metatarsalgia, right foot 2019    Sinus bradycardia     Small bowel obstruction ([address]           Past Surgical History:   Procedure Laterality Date    HX GI  age 29    colon procedure    HX HEART CATHETERIZATION  prior to 2005    x3 stents    HX ORTHOPAEDIC Right 2015    second hammertoe repair    HX OTHER SURGICAL      colonoscopy--polyp    HX TONSILLECTOMY        Social History     Tobacco Use    Smoking status: Former Smoker     Packs/day: 1.00     Years: 30.00     Pack years: 30.00     Quit date: 3/3/2000     Years since quittin.0    Smokeless tobacco: Never Used   Substance Use Topics    Alcohol use: Not Currently     Comment: none in 15 plus years      Family History   Problem Relation Age of Onset    Lung Disease Mother     Lung Disease Father     Abdominal aortic aneurysm Father     ALS Brother       Current Facility-Administered Medications   Medication Dose Route Frequency    dextrose 5% - 0.45% NaCl with KCl 20 mEq/L infusion  125 mL/hr IntraVENous CONTINUOUS    sodium chloride (NS) flush 5-40 mL  5-40 mL IntraVENous Q8H    sodium chloride (NS) flush 5-40 mL  5-40 mL IntraVENous PRN    acetaminophen (TYLENOL) tablet 650 mg  650 mg Oral Q6H PRN    naloxone (NARCAN) injection 0.4 mg  0.4 mg IntraVENous PRN    ondansetron (ZOFRAN) injection 4 mg  4 mg IntraVENous Q4H PRN    diphenhydrAMINE (BENADRYL) injection 25 mg  25 mg IntraVENous Q6H PRN    morphine injection 2 mg  2 mg IntraVENous Q2H PRN    LORazepam (ATIVAN) injection 1 mg  1 mg IntraVENous Q6H PRN    hydrALAZINE (APRESOLINE) 20 mg/mL injection 10 mg  10 mg IntraVENous Q6H PRN    benzocaine-menthoL (CHLORASEPTIC MAX) lozenge 1 Lozenge  1 Lozenge Oral Q2H PRN     Current Outpatient Medications   Medication Sig    HYDROcodone-acetaminophen (NORCO) 7.5-325 mg per tablet Take 1 Tablet by mouth every six (6) hours as needed for Pain for up to 7 days. Max Daily Amount: 4 Tablets.  rosuvastatin (CRESTOR) 5 mg tablet Take  by mouth nightly.  losartan (COZAAR) 25 mg tablet Take 25 mg by mouth daily.  vit A/vit C/vit E/zinc/copper (PRESERVISION AREDS PO) Take 1 Tablet by mouth daily.  multivitamin (ONE A DAY) tablet Take 1 Tab by mouth daily.  omega-3 fatty acids-vitamin e (FISH OIL) 1,000 mg Cap Take 1 Cap by mouth daily. No Known Allergies    Review of Systems:    A comprehensive review of systems was negative except for that written in the History of Present Illness.     Objective:        Visit Vitals  /64   Pulse 73   Temp 97.8 °F (36.6 °C)   Resp 13   Ht 5' 6\" (1.676 m)   Wt 82.6 kg (182 lb)   SpO2 94%   BMI 29.38 kg/m²       Physical Exam:  GENERAL: alert, cooperative, no distress, appears stated age, EYE: negative, LUNG: clear to auscultation bilaterally, HEART: regular rate and rhythm, S1, S2 normal, no murmur, click, rub or gallop, ABDOMEN: soft, non-tender. Bowel sounds hypoactive, distended, tympanic, no surgical scars. No masses,  no organomegaly    Imaging:      CT - Persistent small bowel obstruction with ill-defined transition in the right  abdomen. Decreased mural thickening of nondistended ileal loops. Ill-defined  terminal ileum. Etiology of the small bowel obstruction is most likely subacute  inflammatory enteritis. Crohn's disease is possible. 2. No pneumoperitoneum. 3. Cholelithiasis. Lab/Data Review:  BMP:   Lab Results   Component Value Date/Time     03/16/2022 08:24 AM    K 4.4 03/16/2022 08:24 AM     03/16/2022 08:24 AM    CO2 25 03/16/2022 08:24 AM    AGAP 7 03/16/2022 08:24 AM     (H) 03/16/2022 08:24 AM    BUN 14 03/16/2022 08:24 AM    CREA 0.83 03/16/2022 08:24 AM    GFRAA >60 03/16/2022 08:24 AM    GFRNA >60 03/16/2022 08:24 AM     CBC:   Lab Results   Component Value Date/Time    WBC 15.0 (H) 03/16/2022 08:24 AM    HGB 15.2 03/16/2022 08:24 AM    HCT 45.3 03/16/2022 08:24 AM     03/16/2022 08:24 AM         Assessment:     [de-identified]year old male with SBO and ileitis. Plan:     1. I recommend proceeding with Bowel rest, NG decompression and IVF initially. 2. GI consult to weigh in on the ileitis. It is improved from last admission but, not gone and he still has obstructive symptoms. 3.  Suspect he will likey need surgery this admission given smoldering course and unclear etiology in a virgin abdomen.

## 2022-03-16 NOTE — CONSULTS
Gastroenterology Consult     Referring Physician: Dr. Katja Pimentel    Consult Date: 3/16/2022     Subjective:     Chief Complaint: vomiting    History of Present Illness: Versie Shone is a [de-identified] y.o. male who is seen in consultation for terminal ileitis with recurrent SBO, possible Crohn's. He was admitted a month ago to the surgery service with a SBO and treated conservatively with improvement. He was discharged home and did fairly well until last night. He woke up at 2am with severe vomiting, abd pain and distention similar to prior bowel obstruction. Estimates he vomited 15-20 times. CT showed persistent SBO with ileal thickening and the possibility of Crohn's disease was raised. Patient had a normal BM last night but has not passed any gas or flatus since. NGT was placed in the ER and N/V/abd pain/distention have all improved. He has only mild nausea at the moment. States he had a normal colonoscopy 10-15 yrs ago. He recalls a surgery for a tear in his rectum 30-40 yrs ago. No regular diarrhea. No rectal bleeding, arthralgias, perirectal abscesses, skin rashes, personal hx of autoimmune disease. He has a first cousin with Crohn's disease but states he doesn't have any of the same sxs. Hgb, platelets and albumin are all normal.        Quit smoking many years ago. No ETOH or drug use. Retired dentist.  Enjoys going on cruises.      Past Medical History:   Diagnosis Date    CAD (coronary artery disease)     3 stents    COVID     02/22    Diverticulosis     Hammer toe 7/18/2019    Hammertoe of right foot     Hypertension     Macular degeneration     [facility] injections-[facility]    Metatarsalgia, right foot 7/18/2019    Sinus bradycardia     Small bowel obstruction ([address]     02/22     Past Surgical History:   Procedure Laterality Date    HX GI  age 29    colon procedure    HX HEART CATHETERIZATION  prior to 2005    x3 stents    HX ORTHOPAEDIC Right 2015    second hammertoe repair    HX OTHER SURGICAL      colonoscopy--polyp    HX TONSILLECTOMY        Family History   Problem Relation Age of Onset    Lung Disease Mother     Lung Disease Father     Abdominal aortic aneurysm Father     ALS Brother      Social History     Tobacco Use    Smoking status: Former Smoker     Packs/day: 1.00     Years: 30.00     Pack years: 30.00     Quit date: 3/3/2000     Years since quittin.0    Smokeless tobacco: Never Used   Substance Use Topics    Alcohol use: Not Currently     Comment: none in 15 plus years      No Known Allergies  Current Facility-Administered Medications   Medication Dose Route Frequency    dextrose 5% - 0.45% NaCl with KCl 20 mEq/L infusion  125 mL/hr IntraVENous CONTINUOUS    sodium chloride (NS) flush 5-40 mL  5-40 mL IntraVENous Q8H    sodium chloride (NS) flush 5-40 mL  5-40 mL IntraVENous PRN    acetaminophen (TYLENOL) tablet 650 mg  650 mg Oral Q6H PRN    naloxone (NARCAN) injection 0.4 mg  0.4 mg IntraVENous PRN    ondansetron (ZOFRAN) injection 4 mg  4 mg IntraVENous Q4H PRN    diphenhydrAMINE (BENADRYL) injection 25 mg  25 mg IntraVENous Q6H PRN    morphine injection 2 mg  2 mg IntraVENous Q2H PRN    LORazepam (ATIVAN) injection 1 mg  1 mg IntraVENous Q6H PRN    hydrALAZINE (APRESOLINE) 20 mg/mL injection 10 mg  10 mg IntraVENous Q6H PRN    benzocaine-menthoL (CHLORASEPTIC MAX) lozenge 1 Lozenge  1 Lozenge Oral Q2H PRN     Current Outpatient Medications   Medication Sig    HYDROcodone-acetaminophen (NORCO) 7.5-325 mg per tablet Take 1 Tablet by mouth every six (6) hours as needed for Pain for up to 7 days. Max Daily Amount: 4 Tablets.  rosuvastatin (CRESTOR) 5 mg tablet Take  by mouth nightly.  losartan (COZAAR) 25 mg tablet Take 25 mg by mouth daily.  vit A/vit C/vit E/zinc/copper (PRESERVISION AREDS PO) Take 1 Tablet by mouth daily.  multivitamin (ONE A DAY) tablet Take 1 Tab by mouth daily.       omega-3 fatty acids-vitamin e (FISH OIL) 1,000 mg Cap Take 1 Cap by mouth daily. Review of Systems:  A detailed review of systems was performed as follows:  Constitutional:  + 15 lbs weight loss since the last hospitalization for SBO  Eyes:  No ocular sensitivity to the sun, blurred vision or double vision. ENMT:  No nose or sinus problems. Respiratory: No coughing, wheezing or sob  Cardiac:  No chest pain, exertional chest pain or palpitations  Gastrointestinal:  See history of the present illness  :   No pain with urination or hematuria  Musculoskeletal:  No arthritis or hot swollen joints. Endocrine:  No thyroid disease or diabetes  Psychiatric: No depression or feeling blue  Integumentary:  No skin rash or sensitivity to the sun. Neurologic:  No stroke or seizure; no numbness or tingling of the extremities. Heme-Lymphatic:  No history of anemia, no unexplained lumps or bumps      Objective:     Physical Exam:  Visit Vitals  /64   Pulse 72   Temp 97.7 °F (36.5 °C)   Resp 22   Ht 5' 6\" (1.676 m)   Wt 82.6 kg (182 lb)   SpO2 92%   BMI 29.38 kg/m²        Gen: pleasant [de-identified] y/o white male with NGT in place, NAD  Skin:  Extremities and face reveal no rashes. HEENT: Sclerae anicteric. No abnormal pigmentation of the lips. Cardiovascular: Regular rate and rhythm. No murmurs, gallops, or rubs. Respiratory:  Comfortable breathing with no accessory muscle use. Clear breath sounds with no wheezes, rales, or rhonchi. GI:  Abdomen nondistended, soft, and nontender. Normal active bowel sounds. No enlargement of the liver or spleen. No masses palpable. Rectal:  Deferred  Musculoskeletal:  No pitting edema of the lower legs. Neurological:  Gross memory appears intact. Patient is alert and oriented. Psychiatric:  Mood appears appropriate with judgement intact. Lymphatic:  No cervical or supraclavicular adenopathy.     Lab/Data Review:  CT Results (most recent):  Results from Hospital Encounter encounter on 03/16/22    CT ABD PELELLY W CONT    Narrative  EXAM: CT ABD PELV W CONT    INDICATION: Diffuse abdominal pain and vomiting. Leukocytosis and elevated  alkaline phosphatase. COMPARISON: CT abdomen/pelvis on 2/8/2022    CONTRAST: 100 mL of Isovue-370. ORAL CONTRAST: None    TECHNIQUE:  Following the uneventful intravenous administration of contrast, thin axial  images were obtained through the abdomen and pelvis. Coronal and sagittal  reconstructions were generated. CT dose reduction was achieved through use of a  standardized protocol tailored for this examination and automatic exposure  control for dose modulation. FINDINGS:  LOWER THORAX: No significant abnormality in the incidentally imaged lower chest.  LIVER: No mass. BILIARY TREE: There are subcentimeter gallstones. No cholecystitis. CBD is not  dilated. SPLEEN: Mild splenomegaly is unchanged and measures 13 cm in length. PANCREAS: Mild tail atrophy. No mass or inflammation. No ductal dilatation. ADRENALS: Unremarkable. KIDNEYS: No solid renal mass or hydronephrosis. Bilateral simple renal cysts are  unchanged. Largest is lateral in the lower pole of the left kidney and measures  7.8 cm. No imaging requirement for follow-up. STOMACH: Unremarkable. SMALL BOWEL: Unchanged bowel obstruction with small bowel distention to 3.4 cm. Unchanged ill-defined transition in the right lower quadrant at the closely  approximated small bowel loops. Decreased mural thickening of nondistended ileal  loops in the anterior right abdomen. Terminal ileum is ill-defined. COLON: Mild diverticulosis. No diverticulitis. No mural thickening. No  obstruction. APPENDIX: Normal.  PERITONEUM: No ascites or pneumoperitoneum. RETROPERITONEUM: 3.3 cm infrarenal abdominal aortic aneurysm is unchanged by my  measurements. Aorta is atherosclerotic. Mesenteric arteries are atherosclerotic  without occlusion. REPRODUCTIVE ORGANS: Mild prostatomegaly contains calcifications.   URINARY BLADDER: No mass or calculus. BONES: No destructive bone lesion. ABDOMINAL WALL: No mass or hernia. ADDITIONAL COMMENTS: N/A    Impression  1. Persistent small bowel obstruction with ill-defined transition in the right  abdomen. Decreased mural thickening of nondistended ileal loops. Ill-defined  terminal ileum. Etiology of the small bowel obstruction is most likely subacute  inflammatory enteritis. Crohn's disease is possible. 2. No pneumoperitoneum. 3. Cholelithiasis. Lab Results   Component Value Date/Time    WBC 15.0 (H) 03/16/2022 08:24 AM    HGB 15.2 03/16/2022 08:24 AM    HCT 45.3 03/16/2022 08:24 AM    PLATELET 627 12/82/3969 08:24 AM    MCV 88.3 03/16/2022 08:24 AM     Lab Results   Component Value Date/Time    Sodium 136 03/16/2022 08:24 AM    Potassium 4.4 03/16/2022 08:24 AM    Chloride 104 03/16/2022 08:24 AM    CO2 25 03/16/2022 08:24 AM    Anion gap 7 03/16/2022 08:24 AM    Glucose 111 (H) 03/16/2022 08:24 AM    BUN 14 03/16/2022 08:24 AM    Creatinine 0.83 03/16/2022 08:24 AM    BUN/Creatinine ratio 17 03/16/2022 08:24 AM    GFR est AA >60 03/16/2022 08:24 AM    GFR est non-AA >60 03/16/2022 08:24 AM    Calcium 9.0 03/16/2022 08:24 AM    Bilirubin, total 0.4 03/16/2022 08:24 AM    Alk. phosphatase 119 (H) 03/16/2022 08:24 AM    Protein, total 7.6 03/16/2022 08:24 AM    Albumin 3.7 03/16/2022 08:24 AM    Globulin 3.9 03/16/2022 08:24 AM    A-G Ratio 0.9 (L) 03/16/2022 08:24 AM    ALT (SGPT) 23 03/16/2022 08:24 AM    AST (SGOT) 21 03/16/2022 08:24 AM           Assessment/Plan:     Active Problems:    SBO (small bowel obstruction) ([address] (3/16/2022)       It's possible given the recurrent nature of SBO and involvement of ileum that this is a new presentation of Crohn's disease. He does have a +FH of Crohn's but it usually presents itself earlier in life. We cannot do a colonoscopy in the acute setting of a bowel obstruction. Patient tells me that he will likely have surgery tomorrow or the day after.   If a SB resection is done, it can be sent to pathology to assess for findings characteristic of Crohn's. In the meantime, I will send an IBD panel to look for markers suggestive of Crohn's. Continue current treatment per surgical team.  We will follow along with you.         CHATO Cotter  03/16/22  4:42 PM

## 2022-03-16 NOTE — PROGRESS NOTES
Transition of Care Plan:    RUR: 11% low risk for readmission   Disposition: Home with significant other   Follow up appointments: PCP, GI?  DME needed: Pt owns a walker and cane if needed, not currently using  Transportation at Discharge: Pt's significant other, Osito Sos or means to access home: Yes has access       IM Medicare Letter: Will need 2nd IMM letter prior to d/c  Is patient a BCPI-A Bundle:  N/A        If yes, was Bundle Letter given?:    Is patient a  and connected with the [place]? N/A               If yes, was [employer] transfer form completed and [facility] notified? Caregiver Contact: Pt's significant other, Lilibeth Maya [phone]  Discharge Caregiver contacted prior to discharge? To be contacted  Care Conference needed?: Not currently indicated                   Reason for Admission:  SBO                     RUR Score:  11% low risk for readmission                    Plan for utilizing home health:  No home health needs identified        PCP: First and Last name:  Moses Porras MD     Name of Practice:    Are you a current patient: Yes/No: Yes   Approximate date of last visit: 2/15   Can you participate in a virtual visit with your PCP: No                    Current Advanced Directive/Advance Care Plan: Full Code   Advance Care Planning   Healthcare Decision Maker: Pt reports that he has an AMD at home listing his , Katy Burks, as healthcare decision maker. Declined to add to medical chart at this time. CM requested that pt provide ACP documents for medical chart to reflect his wishes. Transition of Care Plan:  Home with significant other, Griffin Escamilla, and outpatient follow up    CM reviewed chart. CM completed assessment with pt at bedside. CM introduced self/role, verified demographics, and discussed discharge planning. Pt resides with significant other, Griffin Escamilla, and is independent at baseline to include driving.  Pt has a walker and cane available but has not been having to use recently. Pt anticipates that his significant other will transport home at d/c. Pt also has a son who is local. Denies concerns about transition of care plans. Pharmacy preference is [facility] on [address] [place] [address]. Unit care management will continue to follow for transition of care planning needs. Care Management Interventions  PCP Verified by CM: Yes  Palliative Care Criteria Met (RRAT>21 & CHF Dx)?: No  Mode of Transport at Discharge:  Other (see comment) (Significant other, Belkis Puri)  Transition of Care Consult (CM Consult): Discharge Planning  Discharge Durable Medical Equipment: No (Owns a walker and cane, currently isnt needing to use)  Physical Therapy Consult: No  Occupational Therapy Consult: No  Speech Therapy Consult: No  Support Systems: Spouse/Significant Other,Child(chapito)  Confirm Follow Up Transport: Self  Discharge Location  Patient Expects to be Discharged to[de-identified] Home (Home with significant other, outpatient follow up)    Judith Singh [facility] [address]

## 2022-03-16 NOTE — ED PROVIDER NOTES
EMERGENCY DEPARTMENT HISTORY AND PHYSICAL EXAM      Date: 3/16/2022  Patient Name: Roosevelt Alicia    History of Presenting Illness     Chief Complaint   Patient presents with    Vomiting     Vomiting since 2am.  Pt was last seen here 3 weeks ago for a SBO and had to get an NG tube inserted. Pt denies any diarrhea. Last BM last night and was semi formed. Pt rates current pain 5/10 upper abdominal area.  Fall     Pt fell last night while vomiting and landed on right knee. Pt noted to have minor abrasion on right knee. No active bleeding. Denies any LOC, or hitting his head         HPI: Roosevelt Alicia, [de-identified] y.o. male presents to the ED with cc of abdominal pain and vomiting. This started last night. He describes a diffuse abdominal discomfort associated with about 15-20 episodes of vomiting. Mostly bilious looking, thinks there might have been a very little red blood in one of the episodes of emesis. He feels like his abdomen is diffusely mildly distended. After all of this vomiting he began to feel very weak, he got up and became lightheaded, grabbed onto the door and fell to the ground, scraped his right knee. He denies any significant pain in this region. No head trauma. Thinks he might of lost consciousness for about a second. No associated chest pain or shortness of breath. He denies any fevers, no dysuria or hematuria. He had a bowel movement yesterday, however does not think that he is passed any gas today. Recent hospitalization for bowel obstruction that improved after NG tube. There are no other complaints, changes, or physical findings at this time. PCP: Swati Nicholson MD    No current facility-administered medications on file prior to encounter.      Current Outpatient Medications on File Prior to Encounter   Medication Sig Dispense Refill    HYDROcodone-acetaminophen (NORCO) 7.5-325 mg per tablet Take 1 Tablet by mouth every six (6) hours as needed for Pain for up to 7 days. Max Daily Amount: 4 Tablets. 28 Tablet 0    rosuvastatin (CRESTOR) 5 mg tablet Take  by mouth nightly.  losartan (COZAAR) 25 mg tablet Take 25 mg by mouth daily.  vit A/vit C/vit E/zinc/copper (PRESERVISION AREDS PO) Take 1 Tablet by mouth daily.  multivitamin (ONE A DAY) tablet Take 1 Tab by mouth daily.  omega-3 fatty acids-vitamin e (FISH OIL) 1,000 mg Cap Take 1 Cap by mouth daily.            Past History     Past Medical History:  Past Medical History:   Diagnosis Date    CAD (coronary artery disease)     3 stents    COVID         Diverticulosis     Hammer toe 2019    Hammertoe of right foot     Hypertension     Macular degeneration     [facility]    Metatarsalgia, right foot 2019    Sinus bradycardia     Small bowel obstruction ([address]            Past Surgical History:  Past Surgical History:   Procedure Laterality Date    HX GI  age 29    colon procedure    HX HEART CATHETERIZATION  prior to 2005    x3 stents    HX ORTHOPAEDIC Right 2015    second hammertoe repair    HX OTHER SURGICAL      colonoscopy--polyp    HX TONSILLECTOMY         Family History:  Family History   Problem Relation Age of Onset    Lung Disease Mother     Lung Disease Father     Abdominal aortic aneurysm Father     ALS Brother        Social History:  Social History     Tobacco Use    Smoking status: Former Smoker     Packs/day: 1.00     Years: 30.00     Pack years: 30.00     Quit date: 3/3/2000     Years since quittin.0    Smokeless tobacco: Never Used   Vaping Use    Vaping Use: Never used   Substance Use Topics    Alcohol use: Not Currently     Comment: none in 15 plus years    Drug use: Never       Allergies:  No Known Allergies      Review of Systems   no fever  No ear pain  No eye pain  no shortness of breath  no chest pain  Reports diffuse abdominal pain  no dysuria  no leg pain  No rash  No lymphadenopathy  No weight loss    Physical Exam   Physical Exam  Constitutional:       General: He is not in acute distress. Appearance: He is not toxic-appearing. HENT:      Head: Normocephalic and atraumatic. Mouth/Throat:      Mouth: Mucous membranes are moist.   Eyes:      Extraocular Movements: Extraocular movements intact. Cardiovascular:      Rate and Rhythm: Normal rate and regular rhythm. Pulmonary:      Effort: Pulmonary effort is normal.      Breath sounds: Normal breath sounds. Abdominal:      Comments: Mild diffuse tenderness to palpation, mild distention, no guarding or rebound tenderness   Musculoskeletal:         General: No deformity. Cervical back: Neck supple. Skin:     General: Skin is warm and dry. Neurological:      General: No focal deficit present. Mental Status: He is alert and oriented to person, place, and time. Psychiatric:         Mood and Affect: Mood normal.         Diagnostic Study Results     Labs -   No results found for this or any previous visit (from the past 24 hour(s)). Radiologic Studies -   CT ABD PELV W CONT    (Results Pending)     CT Results  (Last 48 hours)    None        CXR Results  (Last 48 hours)    None            Medical Decision Making   I am the first provider for this patient. I reviewed the vital signs, available nursing notes, past medical history, past surgical history, family history and social history. Vital Signs-Reviewed the patient's vital signs. Patient Vitals for the past 24 hrs:   Temp Pulse Resp BP SpO2   03/16/22 0803 97.8 °F (36.6 °C) 73 16 132/68 97 %         Provider Notes (Medical Decision Making):   81-year-old male presenting with abdominal pain and vomiting. Differential includes bowel obstruction, ileus, gastroenteritis, pancreatitis, urinary tract infection, constipation. IV fluids and antiemetics ordered, will obtain CT scan. ED Course:     Initial assessment performed.  The patients presenting problems have been discussed, and they are in agreement with the care plan formulated and outlined with them. I have encouraged them to ask questions as they arise throughout their visit. ED Course as of 03/16/22 1022   Wed Mar 16, 2022   1014 Called and left message with Rubi Vila [FERMÍN]      ED Course User Index  [CM] SvetlanaKim MD      Per chart review, was discharged on 2/11, at that time was treated for SBO that improved with bowel rest and NG tube. CT scan shows persistent small bowel obstruction with ill-defined transition point. NG tube will be placed, antibiotics ordered. CBC with leukocytosis of 15, base metabolic panel normal renal function, no worrisome electrolyte abnormalities. On reevaluation, patient is resting comfortably, vital stable. I spoke with Dr. Rubi Vila who admit the patient. EKG is performed at 8: 50, shows sinus rhythm at a rate of 67, , , QTc 426, axis upright, no ST segment elevation or depression concerning for ACS, this is interpreted as normal sinus rhythm. Critical Care Time:         Disposition:  Admit     PLAN:  1. Current Discharge Medication List        2.    Follow-up Information    None       Return to ED if worse     Diagnosis     Clinical Impression: acute small bowel obstruction

## 2022-03-16 NOTE — PROGRESS NOTES
1620: TRANSFER - OUT REPORT:    Verbal report given to SHAWNA RICO on Nallely Hughes being transferred to [facility] for routine progression of care       Report consisted of patients Situation, Background, Assessment and   Recommendations(SBAR). Information from the following report(s) SBAR, Kardex, ED Summary, Intake/Output, MAR, Recent Results and Cardiac Rhythm sinus rhythm was reviewed with the receiving nurse. Opportunity for questions and clarification was provided.

## 2022-03-17 ENCOUNTER — ANESTHESIA EVENT (OUTPATIENT)
Dept: SURGERY | Age: 82
DRG: 337 | End: 2022-03-17
Payer: MEDICARE

## 2022-03-17 LAB
ANION GAP SERPL CALC-SCNC: 5 MMOL/L (ref 5–15)
BUN SERPL-MCNC: 10 MG/DL (ref 6–20)
BUN/CREAT SERPL: 15 (ref 12–20)
CALCIUM SERPL-MCNC: 8.2 MG/DL (ref 8.5–10.1)
CHLORIDE SERPL-SCNC: 108 MMOL/L (ref 97–108)
CO2 SERPL-SCNC: 27 MMOL/L (ref 21–32)
CREAT SERPL-MCNC: 0.67 MG/DL (ref 0.7–1.3)
ERYTHROCYTE [DISTWIDTH] IN BLOOD BY AUTOMATED COUNT: 13.2 % (ref 11.5–14.5)
GLUCOSE SERPL-MCNC: 109 MG/DL (ref 65–100)
HCT VFR BLD AUTO: 38.7 % (ref 36.6–50.3)
HGB BLD-MCNC: 12.9 G/DL (ref 12.1–17)
MCH RBC QN AUTO: 29.7 PG (ref 26–34)
MCHC RBC AUTO-ENTMCNC: 33.3 G/DL (ref 30–36.5)
MCV RBC AUTO: 89 FL (ref 80–99)
NRBC # BLD: 0 K/UL (ref 0–0.01)
NRBC BLD-RTO: 0 PER 100 WBC
PLATELET # BLD AUTO: 179 K/UL (ref 150–400)
PMV BLD AUTO: 10 FL (ref 8.9–12.9)
POTASSIUM SERPL-SCNC: 4.1 MMOL/L (ref 3.5–5.1)
RBC # BLD AUTO: 4.35 M/UL (ref 4.1–5.7)
SODIUM SERPL-SCNC: 140 MMOL/L (ref 136–145)
WBC # BLD AUTO: 8.1 K/UL (ref 4.1–11.1)

## 2022-03-17 PROCEDURE — 85027 COMPLETE CBC AUTOMATED: CPT

## 2022-03-17 PROCEDURE — 36415 COLL VENOUS BLD VENIPUNCTURE: CPT

## 2022-03-17 PROCEDURE — 99233 SBSQ HOSP IP/OBS HIGH 50: CPT | Performed by: SURGERY

## 2022-03-17 PROCEDURE — 74011250636 HC RX REV CODE- 250/636: Performed by: SURGERY

## 2022-03-17 PROCEDURE — 65270000029 HC RM PRIVATE

## 2022-03-17 PROCEDURE — 74011000250 HC RX REV CODE- 250: Performed by: SURGERY

## 2022-03-17 PROCEDURE — 80048 BASIC METABOLIC PNL TOTAL CA: CPT

## 2022-03-17 RX ORDER — ENOXAPARIN SODIUM 100 MG/ML
40 INJECTION SUBCUTANEOUS EVERY 24 HOURS
Status: DISCONTINUED | OUTPATIENT
Start: 2022-03-17 | End: 2022-03-22 | Stop reason: HOSPADM

## 2022-03-17 RX ADMIN — ENOXAPARIN SODIUM 40 MG: 100 INJECTION SUBCUTANEOUS at 12:22

## 2022-03-17 RX ADMIN — POTASSIUM CHLORIDE, DEXTROSE MONOHYDRATE AND SODIUM CHLORIDE 125 ML/HR: 150; 5; 450 INJECTION, SOLUTION INTRAVENOUS at 10:09

## 2022-03-17 RX ADMIN — LORAZEPAM 1 MG: 2 INJECTION INTRAMUSCULAR; INTRAVENOUS at 22:06

## 2022-03-17 RX ADMIN — SODIUM CHLORIDE, PRESERVATIVE FREE 10 ML: 5 INJECTION INTRAVENOUS at 22:15

## 2022-03-17 RX ADMIN — SODIUM CHLORIDE, PRESERVATIVE FREE 10 ML: 5 INJECTION INTRAVENOUS at 05:59

## 2022-03-17 NOTE — PROGRESS NOTES
Bedside shift change report given to [facility] (oncoming nurse) by King Isaacs (offgoing nurse). Report included the following information SBAR, Kardex, MAR and Recent Results.

## 2022-03-17 NOTE — PROGRESS NOTES
I had conversation with GI Dr. Jacinta Rader covering today,  in follow-up to consult yesterday by Dr. Michael Hsu, and we discussed empiric treatment with steroids for a long segment possible inflammatory bowel disease as I had discussed previously. Patient seem to be more determined to have surgery for definitive diagnosis and possible surgical resection of any inflammatory bowel disease as discussed previously but if GI is willing to consider inflammatory bowel disease and empiric treatment with steroids and patient is willing to wait a few days to see if this improves then I am okay with that and holding off on surgery but if patient decides he wants to proceed with surgery rather than nonoperative therapy or management then we have plans tomorrow for laparoscopy possible laparotomy possible intestinal resection depending on the operative findings.   Patient is aware of the benefits risks alternatives and concurs and will await further discussion with GI before surgery tomorrow     Addendum, Dr Jacinta Rader spoke with patient and wife at length and they do not want steroid trial to see if this improves they want surgical intervention as above and previous discussions which we will arrange tomorrow

## 2022-03-17 NOTE — PROGRESS NOTES
Admit Date: 3/16/2022      POD * No surgery found *  * No surgery found *      Procedure:  * No surgery found *        HOSPITAL DAY:     ANTIBIOTICS:      HPI:  Patient with recurrent intestinal obstruction 4 weeks after last discharge patient reports being on low fiber diet in the meantime. Differential diagnosis includes inflammatory bowel disease or other bacterial enteritis or intestinal obstruction from neoplastic process or adhesive disease unrelated to previous surgery since he has not had any surgery in the abdomen. Patient feeling somewhat better after NG tube decompression. Patient is very active functionally with no cardiac and pulmonary symptoms  Temp:  [97.3 °F (36.3 °C)-98.6 °F (37 °C)]   Pulse (Heart Rate):  [59-82]   BP: (106-163)/()   Resp Rate:  [10-22]   O2 Sat (%):  [88 %-100 %]   Weight:  [82.6 kg (182 lb)]       Intake and Output:  Current Shift: No intake/output data recorded. Last three shifts: 03/15 1901 -  0700  In: 1100 [I.V.:1100]  Out: 750      Blood pressure (!) 123/47, pulse 67, temperature 98.6 °F (37 °C), resp. rate 17, height 5' 6\" (1.676 m), weight 82.6 kg (182 lb), SpO2 94 %. Temp (24hrs), Av.8 °F (36.6 °C), Min:97.3 °F (36.3 °C), Max:98.6 °F (37 °C)        Review of Systems   Respiratory: Negative. Cardiovascular: Negative. Gastrointestinal: Positive for abdominal pain. All other systems reviewed and are negative. Physical Exam  Vitals and nursing note reviewed. Constitutional:       General: He is not in acute distress. Appearance: Normal appearance. He is not ill-appearing. HENT:      Head: Normocephalic and atraumatic. Nose: Nose normal.      Mouth/Throat:      Pharynx: Oropharynx is clear. Cardiovascular:      Rate and Rhythm: Normal rate. Pulmonary:      Effort: Pulmonary effort is normal.   Abdominal:      General: Abdomen is flat. Palpations: Abdomen is soft.       Comments: Minimally tender no peritoneal signs or guarding no obvious hernias   Musculoskeletal:         General: Normal range of motion. Skin:     General: Skin is warm and dry. Neurological:      General: No focal deficit present. Mental Status: He is alert and oriented to person, place, and time. Psychiatric:         Mood and Affect: Mood normal.         Behavior: Behavior normal.         Thought Content: Thought content normal.         Judgment: Judgment normal.         Recent Results (from the past 48 hour(s))   CBC WITH AUTOMATED DIFF    Collection Time: 03/16/22  8:24 AM   Result Value Ref Range    WBC 15.0 (H) 4.1 - 11.1 K/uL    RBC 5.13 4.10 - 5.70 M/uL    HGB 15.2 12.1 - 17.0 g/dL    HCT 45.3 36.6 - 50.3 %    MCV 88.3 80.0 - 99.0 FL    MCH 29.6 26.0 - 34.0 PG    MCHC 33.6 30.0 - 36.5 g/dL    RDW 13.3 11.5 - 14.5 %    PLATELET 944 725 - 327 K/uL    MPV 10.4 8.9 - 12.9 FL    NRBC 0.0 0  WBC    ABSOLUTE NRBC 0.00 0.00 - 0.01 K/uL    NEUTROPHILS 89 (H) 32 - 75 %    LYMPHOCYTES 6 (L) 12 - 49 %    MONOCYTES 5 5 - 13 %    EOSINOPHILS 0 0 - 7 %    BASOPHILS 0 0 - 1 %    IMMATURE GRANULOCYTES 0 0.0 - 0.5 %    ABS. NEUTROPHILS 13.4 (H) 1.8 - 8.0 K/UL    ABS. LYMPHOCYTES 0.9 0.8 - 3.5 K/UL    ABS. MONOCYTES 0.7 0.0 - 1.0 K/UL    ABS. EOSINOPHILS 0.0 0.0 - 0.4 K/UL    ABS. BASOPHILS 0.1 0.0 - 0.1 K/UL    ABS. IMM. GRANS. 0.0 0.00 - 0.04 K/UL    DF AUTOMATED     METABOLIC PANEL, COMPREHENSIVE    Collection Time: 03/16/22  8:24 AM   Result Value Ref Range    Sodium 136 136 - 145 mmol/L    Potassium 4.4 3.5 - 5.1 mmol/L    Chloride 104 97 - 108 mmol/L    CO2 25 21 - 32 mmol/L    Anion gap 7 5 - 15 mmol/L    Glucose 111 (H) 65 - 100 mg/dL    BUN 14 6 - 20 MG/DL    Creatinine 0.83 0.70 - 1.30 MG/DL    BUN/Creatinine ratio 17 12 - 20      GFR est AA >60 >60 ml/min/1.73m2    GFR est non-AA >60 >60 ml/min/1.73m2    Calcium 9.0 8.5 - 10.1 MG/DL    Bilirubin, total 0.4 0.2 - 1.0 MG/DL    ALT (SGPT) 23 12 - 78 U/L    AST (SGOT) 21 15 - 37 U/L    Alk. phosphatase 119 (H) 45 - 117 U/L    Protein, total 7.6 6.4 - 8.2 g/dL    Albumin 3.7 3.5 - 5.0 g/dL    Globulin 3.9 2.0 - 4.0 g/dL    A-G Ratio 0.9 (L) 1.1 - 2.2     LIPASE    Collection Time: 03/16/22  8:24 AM   Result Value Ref Range    Lipase 113 73 - 393 U/L   URINALYSIS W/ RFLX MICROSCOPIC    Collection Time: 03/16/22  8:24 AM   Result Value Ref Range    Color DARK YELLOW      Appearance TURBID (A) CLEAR      Specific gravity 1.030 1.003 - 1.030      pH (UA) 5.0 5.0 - 8.0      Protein TRACE (A) NEG mg/dL    Glucose Negative NEG mg/dL    Ketone TRACE (A) NEG mg/dL    Bilirubin Negative NEG      Blood Negative NEG      Urobilinogen 0.2 0.2 - 1.0 EU/dL    Nitrites Negative NEG      Leukocyte Esterase Negative NEG      WBC 0-4 0 - 4 /hpf    RBC 0-5 0 - 5 /hpf    Epithelial cells FEW FEW /lpf    Bacteria Negative NEG /hpf    Mucus 1+ (A) NEG /lpf    Hyaline cast >20 (H) 0 - 5 /lpf   EKG, 12 LEAD, INITIAL    Collection Time: 03/16/22  8:50 AM   Result Value Ref Range    Ventricular Rate 67 BPM    Atrial Rate 67 BPM    P-R Interval 178 ms    QRS Duration 100 ms    Q-T Interval 404 ms    QTC Calculation (Bezet) 426 ms    Calculated P Axis 62 degrees    Calculated R Axis -24 degrees    Calculated T Axis 72 degrees    Diagnosis       Normal sinus rhythm  Anteroseptal infarct (cited on or before 16-MAR-2022)  Confirmed by Adriana Mayes ([id number]) on 3/16/2022 7:85:30 PM     METABOLIC PANEL, BASIC    Collection Time: 03/17/22  3:38 AM   Result Value Ref Range    Sodium 140 136 - 145 mmol/L    Potassium 4.1 3.5 - 5.1 mmol/L    Chloride 108 97 - 108 mmol/L    CO2 27 21 - 32 mmol/L    Anion gap 5 5 - 15 mmol/L    Glucose 109 (H) 65 - 100 mg/dL    BUN 10 6 - 20 MG/DL    Creatinine 0.67 (L) 0.70 - 1.30 MG/DL    BUN/Creatinine ratio 15 12 - 20      GFR est AA >60 >60 ml/min/1.73m2    GFR est non-AA >60 >60 ml/min/1.73m2    Calcium 8.2 (L) 8.5 - 10.1 MG/DL   CBC W/O DIFF    Collection Time: 03/17/22  3:38 AM   Result Value Ref Range    WBC 8.1 4.1 - 11.1 K/uL    RBC 4.35 4.10 - 5.70 M/uL    HGB 12.9 12.1 - 17.0 g/dL    HCT 38.7 36.6 - 50.3 %    MCV 89.0 80.0 - 99.0 FL    MCH 29.7 26.0 - 34.0 PG    MCHC 33.3 30.0 - 36.5 g/dL    RDW 13.2 11.5 - 14.5 %    PLATELET 781 969 - 491 K/uL    MPV 10.0 8.9 - 12.9 FL    NRBC 0.0 0  WBC    ABSOLUTE NRBC 0.00 0.00 - 0.01 K/uL         XR Results (maximum last 3): Results from [place] encounter on 03/16/22    XR ABD (KUB)    Impression  Appropriate nasogastric tube placement. CT Results (maximum last 3): Results from [place] encounter on 03/16/22    CT ABD PELV W CONT    Impression  1. Persistent small bowel obstruction with ill-defined transition in the right  abdomen. Decreased mural thickening of nondistended ileal loops. Ill-defined  terminal ileum. Etiology of the small bowel obstruction is most likely subacute  inflammatory enteritis. Crohn's disease is possible. 2. No pneumoperitoneum. 3. Cholelithiasis. MRI Results (maximum last 3): Results from [place] encounter on 03/20/09    MRI LUMBAR SPINE W AND W/O CONT      Nuclear Medicine Results (maximum last 3): No results found for this or any previous visit. US Results (maximum last 3): Results from [place] encounter on 07/19/12    US ABD COMP            Active Problems:    SBO (small bowel obstruction) ([facility] [address] (3/16/2022)          ASSESSMENT/PLAN  Patient's intestinal obstructive symptoms seem to be improved but given recurrence in such a short period of time, and low fiber diet, with differential diagnosis above of enteritis whether Crohn's or infectious or adhesive disease for small bowel obstruction, patient really prefers surgical intervention rather than nonoperative intervention since this is recurred so quickly.   I discussed with patient and with GI Dr. Johnson Estrada who had seen patient yesterday, about the options of CT enterography to assess for inflammatory bowel disease and whether patient would be a candidate for treatment and it sounds like this likelihood is relatively low and given the differential diagnosis above including inflammatory bowel disease malignancy adhesive disease, patient strongly preferred to proceed with surgical intervention I explained to him that in my hands I would recommend laparotomy possible intestinal resection or release of adhesions but understanding we may find that there was no surgical findings whatsoever and a negative laparotomy. Patient understood all of this benefits risks alternatives morbidity mortality issues risk of intestinal resection intestinal leak reoperation recovery morbidity over 1 to 2 months and recurring symptoms and patient preferred surgical intervention. The operating room was full today this was not considered an emergency and was going to be late evening today given lack of operative room availability at this point so we will schedule surgery for tomorrow morning at 7:30 AM.  Patient understands and concurs.   We will give prophylactic IV cefoxitin in the morning    Screening Covid test    Addendum March 17, 2022, 3 PM  I had further discussion with patient and at this point I discussed with him and recommended we proceed with exploratory laparoscopy first to see if there is any simple findings that may benefit from laparoscopy and adhesiolysis and if no significant findings then may further address laparotomy to palpate the bowel to look for any other findings that may be of significance patient understood benefits risks alternatives and concurred with above approach and has been reposted with OR      FACE TO FACE time including review of any indicated imaging, discussion with patient, and other providers, exam and discussion with patient:   45         Minutes        END:

## 2022-03-17 NOTE — PROGRESS NOTES
Gastroenterology Daily Progress Note Brett Snowarjun, [address]   for Dr. Grace Her)   [facility]    Admit Date: 3/16/2022     F/u sbo     Subjective:       NGT in place with 400cc dark fluid in canister  Patient states it was emptied at least once that he is aware of  Decent night. No abd pain or vomiting. Not passing any flatus or stool  Plans for surgery    Current Facility-Administered Medications   Medication Dose Route Frequency    dextrose 5% - 0.45% NaCl with KCl 20 mEq/L infusion  125 mL/hr IntraVENous CONTINUOUS    sodium chloride (NS) flush 5-40 mL  5-40 mL IntraVENous Q8H    sodium chloride (NS) flush 5-40 mL  5-40 mL IntraVENous PRN    acetaminophen (TYLENOL) tablet 650 mg  650 mg Oral Q6H PRN    naloxone (NARCAN) injection 0.4 mg  0.4 mg IntraVENous PRN    ondansetron (ZOFRAN) injection 4 mg  4 mg IntraVENous Q4H PRN    diphenhydrAMINE (BENADRYL) injection 25 mg  25 mg IntraVENous Q6H PRN    morphine injection 2 mg  2 mg IntraVENous Q2H PRN    LORazepam (ATIVAN) injection 1 mg  1 mg IntraVENous Q6H PRN    hydrALAZINE (APRESOLINE) 20 mg/mL injection 10 mg  10 mg IntraVENous Q6H PRN    benzocaine-menthoL (CHLORASEPTIC MAX) lozenge 1 Lozenge  1 Lozenge Oral Q2H PRN        Objective:     Visit Vitals  BP (!) 123/47   Pulse 67   Temp 98.6 °F (37 °C)   Resp 17   Ht 5' 6\" (1.676 m)   Wt 82.6 kg (182 lb)   SpO2 94%   BMI 29.38 kg/m²   Blood pressure (!) 123/47, pulse 67, temperature 98.6 °F (37 °C), resp. rate 17, height 5' 6\" (1.676 m), weight 82.6 kg (182 lb), SpO2 94 %. No intake/output data recorded. 03/15 1901 - 03/17 0700  In: 1100 [I.V.:1100]  Out: 750       Intake/Output Summary (Last 24 hours) at 3/17/2022 1027  Last data filed at 3/16/2022 1600  Gross per 24 hour   Intake 100 ml   Output 750 ml   Net -650 ml         Physical Exam:       General: pleasant white male resting comfortably in nad,NGT in place  Chest:  CTA, No rhonchi, rales or rubs.   Heart: S1, S2, RRR  GI: Soft, NT, ND + bowel sounds  Extremities: no edema   CNS: CN II-XII normal.      Labs:       Recent Results (from the past 24 hour(s))   METABOLIC PANEL, BASIC    Collection Time: 03/17/22  3:38 AM   Result Value Ref Range    Sodium 140 136 - 145 mmol/L    Potassium 4.1 3.5 - 5.1 mmol/L    Chloride 108 97 - 108 mmol/L    CO2 27 21 - 32 mmol/L    Anion gap 5 5 - 15 mmol/L    Glucose 109 (H) 65 - 100 mg/dL    BUN 10 6 - 20 MG/DL    Creatinine 0.67 (L) 0.70 - 1.30 MG/DL    BUN/Creatinine ratio 15 12 - 20      GFR est AA >60 >60 ml/min/1.73m2    GFR est non-AA >60 >60 ml/min/1.73m2    Calcium 8.2 (L) 8.5 - 10.1 MG/DL   CBC W/O DIFF    Collection Time: 03/17/22  3:38 AM   Result Value Ref Range    WBC 8.1 4.1 - 11.1 K/uL    RBC 4.35 4.10 - 5.70 M/uL    HGB 12.9 12.1 - 17.0 g/dL    HCT 38.7 36.6 - 50.3 %    MCV 89.0 80.0 - 99.0 FL    MCH 29.7 26.0 - 34.0 PG    MCHC 33.3 30.0 - 36.5 g/dL    RDW 13.2 11.5 - 14.5 %    PLATELET 322 059 - 440 K/uL    MPV 10.0 8.9 - 12.9 FL    NRBC 0.0 0  WBC    ABSOLUTE NRBC 0.00 0.00 - 0.01 K/uL   LABRCNT(wbc:2,hgb:2,hct:2,plt:2,)  Recent Labs     03/17/22  0338 03/16/22  0824    136   K 4.1 4.4    104   CO2 27 25   BUN 10 14   CREA 0.67* 0.83   * 111*   CA 8.2* 9.0   LABRCNT(sgot:3,gpt:3,ap:3,tbiL:3,TP:3,ALB:3,GLOB:3,ggt:3,aml:3,amyp:3,lpse:3,hlpse:3)No results for input(s): INR, PTP, APTT, INREXT in the last 72 hours. Recent Labs     03/16/22  0824   *   TP 7.6   ALB 3.7   GLOB 3.9   LPSE 113   BRIEFLAB(B12,FOL,FOLAT,RBCF)No results found for: FOL, RBCFLABRCNT(CPK:3,CpKMB:3,ckndx:3,troiq:3)No components found for: GLPOCBRIEFLAB(CHOL,CHOLX,CHOLP,CHLST,CHOLV,HDL,HDLC,HDLP,LDL,DLDL,LDLC,DLDLP,TGL,TGLX,TRIGL,TRIGP,CHHD,CHHDX)No results for input(s): PH, PCO2, PO2 in the last 72 hours. LABRCNT(CPK:3,CpKMB:3,ckndx:3,troiq:3)CHATO Gonzáles  No results for input(s): CPK, CKNDX, TROIQ in the last 72 hours.     No lab exists for component: CHATO Webb      Problem List:     Active Problems:    SBO (small bowel obstruction) ([facility] [address] (3/16/2022)        Impression:    SBO         Plan:  The question of SB Crohns has been raised. This would be an unusual presentation at his age. Need pathology to confirm or refute. Can't do a colonoscopy in the setting of SBO. Awaiting surgery. IBD panel not sent yet. Stool studies also ordered in case he starts having diarrhea to rule out infectious causes. We will follow with you. CHATO Tolbert  10:30 AM   3/17/2022  [id number] [place] [address], [address] [place] [address]  [address] [id number]  [address] [place] [address] [place]: [phone]        The patient was seen and examined by me. I have discussed the case with the mid-level provider in detail. I have reviewed the patient's chart and the note. I personally performed all components of the history, physical, and medical decision making and agree with the assessment and plan, with minor modifications as noted. Please see APPs note for full details. He has not passed any flatus or has had a bowel movement. NG tube is collecting a dark brown liquid. About 400 mL of present in the canister. He said the night  nurse  changed a canister overnight. Physical exam:  General:AAO x 3, NG tube in. Coherent and pleasant. HEENT:  EOMI,   Chest:  CTA,Heart: S1, S2, RRR  GI:  soft on my examination with no significant tenderness. Decreased bowel sounds  Extremities: No edema    Data Review:  reviewed     CTA/P  3.16.22  w: \"Unchanged bowel obstruction with small bowel distention to 3.4 cm. Unchanged ill-defined transition in the right lower quadrant at the closely  approximated small bowel loops. Decreased mural thickening of nondistended ileal loops in the anterior right abdomen. Terminal ileum is ill-defined\". CT 2.8.22: \"1. Small bowel obstruction.  Transition point at the level of the right mid  abdominal ileum where there is a thickened segment measuring approximately 40 cm  in length. Segment of mildly thickened proximal jejunum. 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 He was admitted last month with a similar presentation and improved with medical management. I went to radiology and discussed both CT scans in person with them. There are abnormal loops of bowels in his right lower quadrant in the ileal area. The colon has stool especially in the rectum and the sigmoid colon. There is some fluid in the transverse and ascending colon. His last colonoscopy was many years ago. There is no family history of Crohn's disease. He denies previous symptoms similarly. He has not had any abdominal surgeries in the past.   ·  I have reviewed Dr. Alea Phelps detailed  notes and plan is to take him to the OR today. We await final diagnosis to see if this obstruction is anatomical, inflammatory, ischemic, infectious or malignant. · I have added  inflammatory bowel disease panel. · Agree that a colonoscopy with biopsies of the terminal ileum for tissue diagnosis will be difficult with the small bowel obstruction with stool in his colon. · We will follow with you. Addendum:Case d/w Dr Ethan Eaton in person. Option of trying IV steroids discussed. I came back to discuss with Mr Scott Leon in detail. His wife was in attendance. I gave him the option of trying IV Solumedrol to see if this would help 'open up' his SB and decrease the inflammation as noted on CT(IBD possibilty). He does not want to try steroids and would rather have a definitive diagnosis with a surgical evaluation/biopsy/resection. I called and spoke w/ Dr Ethan Eaton re: pt's decision. Signed By: Rita Reagan.  Riki Bowie MD    3/17/2022  3:13 PM

## 2022-03-17 NOTE — ANESTHESIA PREPROCEDURE EVALUATION
Relevant Problems   No relevant active problems       Anesthetic History   No history of anesthetic complications            Review of Systems / Medical History  Patient summary reviewed, nursing notes reviewed and pertinent labs reviewed    Pulmonary          Smoker      Comments: Former smoker.  Quit 2000. 30 pack year hx   Neuro/Psych   Within defined limits           Cardiovascular    Hypertension        Dysrhythmias : PVC  Past MI, CAD and hyperlipidemia    Exercise tolerance: >4 METS  Comments: EKG (02/2022): NSR, PVCs  Coronary stent x 3  Hx bradycardia   Anteroseptal infarct   GI/Hepatic/Renal               Comments: Diverticulosis   SBO (small bowel obstruction)  Endo/Other        Obesity     Other Findings   Comments: Macular degeneration            Physical Exam    Airway  Mallampati: II  TM Distance: > 6 cm  Neck ROM: normal range of motion   Mouth opening: Normal     Cardiovascular  Regular rate and rhythm,  S1 and S2 normal,  no murmur, click, rub, or gallop             Dental    Dentition: Caps/crowns     Pulmonary  Breath sounds clear to auscultation               Abdominal  GI exam deferred       Other Findings            Anesthetic Plan    ASA: 3  Anesthesia type: general    Monitoring Plan: BIS      Induction: Intravenous  Anesthetic plan and risks discussed with: Patient

## 2022-03-17 NOTE — PROGRESS NOTES
Problem: Falls - Risk of  Goal: *Absence of Falls  Description: Document Katya Embs Fall Risk and appropriate interventions in the flowsheet.   Outcome: Progressing Towards Goal  Note: Fall Risk Interventions:                      History of Falls Interventions: Bed/chair exit alarm

## 2022-03-18 ENCOUNTER — ANESTHESIA (OUTPATIENT)
Dept: SURGERY | Age: 82
DRG: 337 | End: 2022-03-18
Payer: MEDICARE

## 2022-03-18 LAB
ANION GAP SERPL CALC-SCNC: 6 MMOL/L (ref 5–15)
ATRIAL RATE: 54 BPM
ATRIAL RATE: 65 BPM
BUN SERPL-MCNC: 12 MG/DL (ref 6–20)
BUN/CREAT SERPL: 17 (ref 12–20)
CALCIUM SERPL-MCNC: 8.6 MG/DL (ref 8.5–10.1)
CALCULATED P AXIS, ECG09: 32 DEGREES
CALCULATED P AXIS, ECG09: 54 DEGREES
CALCULATED R AXIS, ECG10: -35 DEGREES
CALCULATED R AXIS, ECG10: -37 DEGREES
CALCULATED T AXIS, ECG11: 66 DEGREES
CALCULATED T AXIS, ECG11: 72 DEGREES
CHLORIDE SERPL-SCNC: 110 MMOL/L (ref 97–108)
CO2 SERPL-SCNC: 25 MMOL/L (ref 21–32)
CREAT SERPL-MCNC: 0.7 MG/DL (ref 0.7–1.3)
DIAGNOSIS, 93000: NORMAL
DIAGNOSIS, 93000: NORMAL
ERYTHROCYTE [DISTWIDTH] IN BLOOD BY AUTOMATED COUNT: 13.2 % (ref 11.5–14.5)
GLUCOSE SERPL-MCNC: 90 MG/DL (ref 65–100)
HCT VFR BLD AUTO: 42.9 % (ref 36.6–50.3)
HGB BLD-MCNC: 14.6 G/DL (ref 12.1–17)
MCH RBC QN AUTO: 30.1 PG (ref 26–34)
MCHC RBC AUTO-ENTMCNC: 34 G/DL (ref 30–36.5)
MCV RBC AUTO: 88.5 FL (ref 80–99)
NRBC # BLD: 0 K/UL (ref 0–0.01)
NRBC BLD-RTO: 0 PER 100 WBC
P-R INTERVAL, ECG05: 170 MS
P-R INTERVAL, ECG05: 172 MS
PLATELET # BLD AUTO: 183 K/UL (ref 150–400)
PMV BLD AUTO: 9.8 FL (ref 8.9–12.9)
POTASSIUM SERPL-SCNC: 4.1 MMOL/L (ref 3.5–5.1)
Q-T INTERVAL, ECG07: 408 MS
Q-T INTERVAL, ECG07: 424 MS
QRS DURATION, ECG06: 102 MS
QRS DURATION, ECG06: 102 MS
QTC CALCULATION (BEZET), ECG08: 402 MS
QTC CALCULATION (BEZET), ECG08: 424 MS
RBC # BLD AUTO: 4.85 M/UL (ref 4.1–5.7)
SODIUM SERPL-SCNC: 141 MMOL/L (ref 136–145)
VENTRICULAR RATE, ECG03: 54 BPM
VENTRICULAR RATE, ECG03: 65 BPM
WBC # BLD AUTO: 9.2 K/UL (ref 4.1–11.1)

## 2022-03-18 PROCEDURE — 0WQF4ZZ REPAIR ABDOMINAL WALL, PERCUTANEOUS ENDOSCOPIC APPROACH: ICD-10-PCS | Performed by: SURGERY

## 2022-03-18 PROCEDURE — 85027 COMPLETE CBC AUTOMATED: CPT

## 2022-03-18 PROCEDURE — 74011250636 HC RX REV CODE- 250/636: Performed by: NURSE ANESTHETIST, CERTIFIED REGISTERED

## 2022-03-18 PROCEDURE — 77030026438 HC STYL ET INTUB CARD -A: Performed by: ANESTHESIOLOGY

## 2022-03-18 PROCEDURE — 77030031139 HC SUT VCRL2 J&J -A: Performed by: SURGERY

## 2022-03-18 PROCEDURE — 77030019908 HC STETH ESOPH SIMS -A: Performed by: ANESTHESIOLOGY

## 2022-03-18 PROCEDURE — 77030020829: Performed by: SURGERY

## 2022-03-18 PROCEDURE — 76060000033 HC ANESTHESIA 1 TO 1.5 HR: Performed by: SURGERY

## 2022-03-18 PROCEDURE — 93005 ELECTROCARDIOGRAM TRACING: CPT

## 2022-03-18 PROCEDURE — 77030013079 HC BLNKT BAIR HGGR 3M -A: Performed by: ANESTHESIOLOGY

## 2022-03-18 PROCEDURE — 74011000250 HC RX REV CODE- 250: Performed by: SURGERY

## 2022-03-18 PROCEDURE — 77030008684 HC TU ET CUF COVD -B: Performed by: ANESTHESIOLOGY

## 2022-03-18 PROCEDURE — 77030005513 HC CATH URETH FOL11 MDII -B: Performed by: SURGERY

## 2022-03-18 PROCEDURE — 65270000029 HC RM PRIVATE

## 2022-03-18 PROCEDURE — 44238 UNLISTED LAPS PX INTESTINE: CPT | Performed by: SURGERY

## 2022-03-18 PROCEDURE — 0DNU0ZZ RELEASE OMENTUM, OPEN APPROACH: ICD-10-PCS | Performed by: SURGERY

## 2022-03-18 PROCEDURE — 76010000161 HC OR TIME 1 TO 1.5 HR INTENSV-TIER 1: Performed by: SURGERY

## 2022-03-18 PROCEDURE — 74011250636 HC RX REV CODE- 250/636: Performed by: SURGERY

## 2022-03-18 PROCEDURE — 74011250636 HC RX REV CODE- 250/636: Performed by: ANESTHESIOLOGY

## 2022-03-18 PROCEDURE — 77030002933 HC SUT MCRYL J&J -A: Performed by: SURGERY

## 2022-03-18 PROCEDURE — 80048 BASIC METABOLIC PNL TOTAL CA: CPT

## 2022-03-18 PROCEDURE — 74011000250 HC RX REV CODE- 250: Performed by: NURSE ANESTHETIST, CERTIFIED REGISTERED

## 2022-03-18 PROCEDURE — 36415 COLL VENOUS BLD VENIPUNCTURE: CPT

## 2022-03-18 PROCEDURE — 76210000001 HC OR PH I REC 2.5 TO 3 HR: Performed by: SURGERY

## 2022-03-18 PROCEDURE — 2709999900 HC NON-CHARGEABLE SUPPLY: Performed by: SURGERY

## 2022-03-18 PROCEDURE — 74011000258 HC RX REV CODE- 258: Performed by: SURGERY

## 2022-03-18 RX ORDER — SUCCINYLCHOLINE CHLORIDE 20 MG/ML
INJECTION INTRAMUSCULAR; INTRAVENOUS AS NEEDED
Status: DISCONTINUED | OUTPATIENT
Start: 2022-03-18 | End: 2022-03-18 | Stop reason: HOSPADM

## 2022-03-18 RX ORDER — SODIUM CHLORIDE, SODIUM LACTATE, POTASSIUM CHLORIDE, CALCIUM CHLORIDE 600; 310; 30; 20 MG/100ML; MG/100ML; MG/100ML; MG/100ML
25 INJECTION, SOLUTION INTRAVENOUS CONTINUOUS
Status: DISCONTINUED | OUTPATIENT
Start: 2022-03-18 | End: 2022-03-18 | Stop reason: HOSPADM

## 2022-03-18 RX ORDER — FENTANYL CITRATE 50 UG/ML
25 INJECTION, SOLUTION INTRAMUSCULAR; INTRAVENOUS
Status: DISCONTINUED | OUTPATIENT
Start: 2022-03-18 | End: 2022-03-18 | Stop reason: HOSPADM

## 2022-03-18 RX ORDER — BUPIVACAINE HYDROCHLORIDE AND EPINEPHRINE 2.5; 5 MG/ML; UG/ML
INJECTION, SOLUTION EPIDURAL; INFILTRATION; INTRACAUDAL; PERINEURAL AS NEEDED
Status: DISCONTINUED | OUTPATIENT
Start: 2022-03-18 | End: 2022-03-18 | Stop reason: HOSPADM

## 2022-03-18 RX ORDER — DEXAMETHASONE SODIUM PHOSPHATE 4 MG/ML
INJECTION, SOLUTION INTRA-ARTICULAR; INTRALESIONAL; INTRAMUSCULAR; INTRAVENOUS; SOFT TISSUE AS NEEDED
Status: DISCONTINUED | OUTPATIENT
Start: 2022-03-18 | End: 2022-03-18 | Stop reason: HOSPADM

## 2022-03-18 RX ORDER — ONDANSETRON 2 MG/ML
INJECTION INTRAMUSCULAR; INTRAVENOUS AS NEEDED
Status: DISCONTINUED | OUTPATIENT
Start: 2022-03-18 | End: 2022-03-18 | Stop reason: HOSPADM

## 2022-03-18 RX ORDER — LIDOCAINE HYDROCHLORIDE 20 MG/ML
INJECTION, SOLUTION EPIDURAL; INFILTRATION; INTRACAUDAL; PERINEURAL AS NEEDED
Status: DISCONTINUED | OUTPATIENT
Start: 2022-03-18 | End: 2022-03-18 | Stop reason: HOSPADM

## 2022-03-18 RX ORDER — ROCURONIUM BROMIDE 10 MG/ML
INJECTION, SOLUTION INTRAVENOUS AS NEEDED
Status: DISCONTINUED | OUTPATIENT
Start: 2022-03-18 | End: 2022-03-18 | Stop reason: HOSPADM

## 2022-03-18 RX ORDER — HYDROCODONE BITARTRATE AND ACETAMINOPHEN 5; 325 MG/1; MG/1
1 TABLET ORAL
Qty: 10 TABLET | Refills: 0 | Status: SHIPPED | OUTPATIENT
Start: 2022-03-18 | End: 2022-03-21

## 2022-03-18 RX ORDER — MORPHINE SULFATE 2 MG/ML
2 INJECTION, SOLUTION INTRAMUSCULAR; INTRAVENOUS
Status: DISCONTINUED | OUTPATIENT
Start: 2022-03-18 | End: 2022-03-18 | Stop reason: HOSPADM

## 2022-03-18 RX ORDER — KETAMINE HYDROCHLORIDE 10 MG/ML
INJECTION, SOLUTION INTRAMUSCULAR; INTRAVENOUS AS NEEDED
Status: DISCONTINUED | OUTPATIENT
Start: 2022-03-18 | End: 2022-03-18 | Stop reason: HOSPADM

## 2022-03-18 RX ORDER — SODIUM CHLORIDE 0.9 % (FLUSH) 0.9 %
5-40 SYRINGE (ML) INJECTION AS NEEDED
Status: DISCONTINUED | OUTPATIENT
Start: 2022-03-18 | End: 2022-03-18 | Stop reason: HOSPADM

## 2022-03-18 RX ORDER — FENTANYL CITRATE 50 UG/ML
INJECTION, SOLUTION INTRAMUSCULAR; INTRAVENOUS AS NEEDED
Status: DISCONTINUED | OUTPATIENT
Start: 2022-03-18 | End: 2022-03-18 | Stop reason: HOSPADM

## 2022-03-18 RX ORDER — FENTANYL CITRATE 50 UG/ML
50 INJECTION, SOLUTION INTRAMUSCULAR; INTRAVENOUS AS NEEDED
Status: DISCONTINUED | OUTPATIENT
Start: 2022-03-18 | End: 2022-03-18 | Stop reason: HOSPADM

## 2022-03-18 RX ORDER — GLYCOPYRROLATE 0.2 MG/ML
INJECTION INTRAMUSCULAR; INTRAVENOUS AS NEEDED
Status: DISCONTINUED | OUTPATIENT
Start: 2022-03-18 | End: 2022-03-18 | Stop reason: HOSPADM

## 2022-03-18 RX ORDER — SODIUM CHLORIDE 0.9 % (FLUSH) 0.9 %
5-40 SYRINGE (ML) INJECTION EVERY 8 HOURS
Status: DISCONTINUED | OUTPATIENT
Start: 2022-03-18 | End: 2022-03-18 | Stop reason: HOSPADM

## 2022-03-18 RX ORDER — HYDROMORPHONE HYDROCHLORIDE 1 MG/ML
.2-.5 INJECTION, SOLUTION INTRAMUSCULAR; INTRAVENOUS; SUBCUTANEOUS
Status: DISCONTINUED | OUTPATIENT
Start: 2022-03-18 | End: 2022-03-18 | Stop reason: HOSPADM

## 2022-03-18 RX ORDER — PROPOFOL 10 MG/ML
INJECTION, EMULSION INTRAVENOUS AS NEEDED
Status: DISCONTINUED | OUTPATIENT
Start: 2022-03-18 | End: 2022-03-18 | Stop reason: HOSPADM

## 2022-03-18 RX ORDER — EPHEDRINE SULFATE/0.9% NACL/PF 50 MG/5 ML
SYRINGE (ML) INTRAVENOUS AS NEEDED
Status: DISCONTINUED | OUTPATIENT
Start: 2022-03-18 | End: 2022-03-18 | Stop reason: HOSPADM

## 2022-03-18 RX ORDER — LIDOCAINE HYDROCHLORIDE 10 MG/ML
0.1 INJECTION, SOLUTION EPIDURAL; INFILTRATION; INTRACAUDAL; PERINEURAL AS NEEDED
Status: DISCONTINUED | OUTPATIENT
Start: 2022-03-18 | End: 2022-03-18 | Stop reason: HOSPADM

## 2022-03-18 RX ORDER — NEOSTIGMINE METHYLSULFATE 1 MG/ML
INJECTION, SOLUTION INTRAVENOUS AS NEEDED
Status: DISCONTINUED | OUTPATIENT
Start: 2022-03-18 | End: 2022-03-18 | Stop reason: HOSPADM

## 2022-03-18 RX ADMIN — PROPOFOL 150 MG: 10 INJECTION, EMULSION INTRAVENOUS at 07:35

## 2022-03-18 RX ADMIN — Medication 3 MG: at 08:36

## 2022-03-18 RX ADMIN — CEFOXITIN 2 G: 2 INJECTION, POWDER, FOR SOLUTION INTRAVENOUS at 07:45

## 2022-03-18 RX ADMIN — Medication 10 MG: at 07:51

## 2022-03-18 RX ADMIN — FENTANYL CITRATE 100 MCG: 50 INJECTION, SOLUTION INTRAMUSCULAR; INTRAVENOUS at 07:35

## 2022-03-18 RX ADMIN — SODIUM CHLORIDE, POTASSIUM CHLORIDE, SODIUM LACTATE AND CALCIUM CHLORIDE: 600; 310; 30; 20 INJECTION, SOLUTION INTRAVENOUS at 07:28

## 2022-03-18 RX ADMIN — SODIUM CHLORIDE, PRESERVATIVE FREE 10 ML: 5 INJECTION INTRAVENOUS at 06:33

## 2022-03-18 RX ADMIN — ONDANSETRON HYDROCHLORIDE 4 MG: 2 INJECTION, SOLUTION INTRAMUSCULAR; INTRAVENOUS at 08:36

## 2022-03-18 RX ADMIN — SUCCINYLCHOLINE CHLORIDE 160 MG: 20 INJECTION, SOLUTION INTRAMUSCULAR; INTRAVENOUS at 07:35

## 2022-03-18 RX ADMIN — ROCURONIUM BROMIDE 30 MG: 10 INJECTION INTRAVENOUS at 07:46

## 2022-03-18 RX ADMIN — DEXAMETHASONE SODIUM PHOSPHATE 8 MG: 4 INJECTION, SOLUTION INTRAMUSCULAR; INTRAVENOUS at 07:40

## 2022-03-18 RX ADMIN — LORAZEPAM 1 MG: 2 INJECTION INTRAMUSCULAR; INTRAVENOUS at 22:26

## 2022-03-18 RX ADMIN — SODIUM CHLORIDE, PRESERVATIVE FREE 10 ML: 5 INJECTION INTRAVENOUS at 14:00

## 2022-03-18 RX ADMIN — SODIUM CHLORIDE, PRESERVATIVE FREE 10 ML: 5 INJECTION INTRAVENOUS at 22:26

## 2022-03-18 RX ADMIN — GLYCOPYRROLATE 0.4 MG: 0.2 INJECTION, SOLUTION INTRAMUSCULAR; INTRAVENOUS at 08:36

## 2022-03-18 RX ADMIN — KETAMINE HYDROCHLORIDE 50 MG: 10 INJECTION, SOLUTION INTRAMUSCULAR; INTRAVENOUS at 08:03

## 2022-03-18 RX ADMIN — LIDOCAINE HYDROCHLORIDE 100 MG: 20 INJECTION, SOLUTION INTRAVENOUS at 07:32

## 2022-03-18 NOTE — CONSULTS
Podiatry Consult    Subjective: Pt known to me for outpatient Sx for right 3rd metatarsal osteotomy 2 weeks ago. I am consulted for PO care since the patient has been admitted for bowel obstruction and is S/P Sx for the obstruction. Patient relates only mild pain right foot. He left his surgical boot at home. Date of Consultation: 2022     Referring Physician: Dr. Tacho Snyder MD    Patient is a [de-identified] y.o.  male who is being seen for F/U of Sx right foot.      Patient Active Problem List    Diagnosis Date Noted    SBO (small bowel obstruction) ([address] 2022    Small bowel obstruction ([address] 2022    COVID-19 2022     Past Medical History:   Diagnosis Date    CAD (coronary artery disease)     3 stents    COVID         Diverticulosis     Hammer toe 2019    Hammertoe of right foot     Hypertension     Macular degeneration     [facility]    Metatarsalgia, right foot 2019    Sinus bradycardia     Small bowel obstruction ([address]           Past Surgical History:   Procedure Laterality Date    HX GI  age 29    colon procedure    HX HEART CATHETERIZATION  prior to 2005    x3 stents    HX ORTHOPAEDIC Right     second hammertoe repair    HX ORTHOPAEDIC Right 2022    foot surgery     HX OTHER SURGICAL      colonoscopy--polyp    HX TONSILLECTOMY        Family History   Problem Relation Age of Onset    Lung Disease Mother     Lung Disease Father     Abdominal aortic aneurysm Father     ALS Brother       Social History     Tobacco Use    Smoking status: Former Smoker     Packs/day: 1.00     Years: 30.00     Pack years: 30.00     Quit date: 3/3/2000     Years since quittin.0    Smokeless tobacco: Never Used   Substance Use Topics    Alcohol use: Not Currently     Comment: none in 15 plus years     Current Facility-Administered Medications   Medication Dose Route Frequency Provider Last Rate Last Admin    enoxaparin (LOVENOX) injection 40 mg  40 mg SubCUTAneous Q24H Joseph Smith MD   40 mg at 22 1222    dextrose 5% - 0.45% NaCl with KCl 20 mEq/L infusion  125 mL/hr IntraVENous CONTINUOUS Paulina Adams  mL/hr at 22 1009 125 mL/hr at 22 1009    sodium chloride (NS) flush 5-40 mL  5-40 mL IntraVENous Q8H Paulina Adams MD   10 mL at 22 1400    sodium chloride (NS) flush 5-40 mL  5-40 mL IntraVENous PRN Paulina Adams MD        acetaminophen (TYLENOL) tablet 650 mg  650 mg Oral Q6H PRN Paulina Adams MD        [facility]) injection 0.4 mg  0.4 mg IntraVENous PRN Paulina Adams MD        ondansetron [facility]) injection 4 mg  4 mg IntraVENous Q4H PRN Paulina Adams MD        diphenhydrAMINE (BENADRYL) injection 25 mg  25 mg IntraVENous Q6H PRN Paulina Adams MD        morphine injection 2 mg  2 mg IntraVENous Q2H PRN Paulina Adams MD        LORazepam (ATIVAN) injection 1 mg  1 mg IntraVENous Q6H PRN Paulina Adams MD   1 mg at 22 2206    hydrALAZINE (APRESOLINE) 20 mg/mL injection 10 mg  10 mg IntraVENous Q6H PRN Paulina Adams MD        benzocaine-menthoL [facility] MAX) lozenge 1 Lozenge  1 Lozenge Oral Q2H PRN Paulina Adams MD          No Known Allergies     Review of Systems:  AO x4. NAD.     Objective:     Patient Vitals for the past 8 hrs:   BP Temp Pulse Resp SpO2   22 1420 (!) 107/56 98.2 °F (36.8 °C) (!) 54 17 96 %   22 1218 125/73 98.3 °F (36.8 °C) 60 15 94 %   22 1145 (!) 117/56 98 °F (36.7 °C) (!) 56 10 95 %   22 1130 125/65  (!) 55 12 95 %   22 1115 115/62  (!) 54 15 96 %   22 1100 126/65  (!) 53 16 95 %   22 1045 119/67  (!) 53 16 95 %   22 1030 118/67  78 15 95 %   22 1015 121/62  (!) 56 16 96 %   22 1000 132/72  62 15 96 %   22 0945 (!) 145/79  65 14 97 %   22 0935 (!) 147/72  67 15 97 %   22 0930 (!) 153/80  71 16 97 %     Temp (24hrs), Av °F (36.7 °C), Min:97.6 °F (36.4 °C), Max:98.3 °F (36.8 °C)      Physical Exam Lower Extremities:    DP and PT 2/4; CFT less than 3 seconds  Sensation intact to light touch  Incision healed right foot and sutures have been removed. Typical local PO edema. Right 3rd toe in normal anatomic position. No POP of the right 3rd MPJ.     Lab Review:   Recent Results (from the past 24 hour(s))   METABOLIC PANEL, BASIC    Collection Time: 03/18/22  3:57 AM   Result Value Ref Range    Sodium 141 136 - 145 mmol/L    Potassium 4.1 3.5 - 5.1 mmol/L    Chloride 110 (H) 97 - 108 mmol/L    CO2 25 21 - 32 mmol/L    Anion gap 6 5 - 15 mmol/L    Glucose 90 65 - 100 mg/dL    BUN 12 6 - 20 MG/DL    Creatinine 0.70 0.70 - 1.30 MG/DL    BUN/Creatinine ratio 17 12 - 20      GFR est AA >60 >60 ml/min/1.73m2    GFR est non-AA >60 >60 ml/min/1.73m2    Calcium 8.6 8.5 - 10.1 MG/DL   CBC W/O DIFF    Collection Time: 03/18/22  3:57 AM   Result Value Ref Range    WBC 9.2 4.1 - 11.1 K/uL    RBC 4.85 4.10 - 5.70 M/uL    HGB 14.6 12.1 - 17.0 g/dL    HCT 42.9 36.6 - 50.3 %    MCV 88.5 80.0 - 99.0 FL    MCH 30.1 26.0 - 34.0 PG    MCHC 34.0 30.0 - 36.5 g/dL    RDW 13.2 11.5 - 14.5 %    PLATELET 359 292 - 179 K/uL    MPV 9.8 8.9 - 12.9 FL    NRBC 0.0 0  WBC    ABSOLUTE NRBC 0.00 0.00 - 0.01 K/uL   EKG, 12 LEAD, INITIAL    Collection Time: 03/18/22  9:41 AM   Result Value Ref Range    Ventricular Rate 65 BPM    Atrial Rate 65 BPM    P-R Interval 170 ms    QRS Duration 102 ms    Q-T Interval 408 ms    QTC Calculation (Bezet) 424 ms    Calculated P Axis 54 degrees    Calculated R Axis -35 degrees    Calculated T Axis 72 degrees    Diagnosis       Sinus rhythm with occasional premature ventricular complexes  Left axis deviation      Confirmed by Viet Sinks ([id number]) on 3/18/2022 4:07:51 PM     EKG, 12 LEAD, INITIAL    Collection Time: 03/18/22 10:26 AM   Result Value Ref Range    Ventricular Rate 54 BPM    Atrial Rate 54 BPM    P-R Interval 172 ms    QRS Duration 102 ms    Q-T Interval 424 ms    QTC Calculation (Bezet) 402 ms    Calculated P Axis 32 degrees    Calculated R Axis -37 degrees    Calculated T Axis 66 degrees    Diagnosis       Sinus bradycardia with premature atrial complexes  Left axis deviation  Abnormal ECG    Confirmed by Evelin Laguna ([id number]) on 3/18/2022 4:08:07 PM         Impression:   PO, normal pain level    Recommendation:   Sutures have already been removed by the nurse, which is fine. No need for bandaging right foot, bathe normally. Minimize walking on right foot until he can use the CAM Boot again. F/U in my office in 1-2 weeks.

## 2022-03-18 NOTE — PROGRESS NOTES
Problem: Falls - Risk of  Goal: *Absence of Falls  Description: Document Jeramy Todd Fall Risk and appropriate interventions in the flowsheet. Outcome: Progressing Towards Goal  Note: Fall Risk Interventions:                      History of Falls Interventions: Bed/chair exit alarm         Problem: Falls - Risk of  Goal: *Absence of Falls  Description: Document Poonam Fall Risk and appropriate interventions in the flowsheet.   Outcome: Progressing Towards Goal  Note: Fall Risk Interventions:                      History of Falls Interventions: Bed/chair exit alarm

## 2022-03-18 NOTE — PERIOP NOTES
Cardiology consult called to Dr. Asher Monique, who is the patient's cardiologist. [address] [facility] is currently covering for Dr. Manju Garcia and will follow through with consult. 1050 Second call to Cardiology office. Dr. Charo Cannon is assigned to patient and we should receive call back soon.

## 2022-03-18 NOTE — OP NOTES
FULL Operative Note    Patient: Roosevelt Alicia MRN: [id number]  SSN: [id number]    YOB: 1940  Age: [de-identified] y.o. Sex: male      Date of Surgery: 3/18/2022     Preoperative Diagnosis: Intermittent intestinal obstruction    Postoperative Diagnosis: Small bowel obstruction, internal hernia from omental adhesions    Surgeon(s) and Role:     Abner Larry MD - Primary    Surgical Staff: Circ-1: Jazmín Murray RN  Scrub Tech-1: Antonia Nicholas  Surg Asst-1: Yady Colorado  Flo Staff: Girish Olivarez RN     Anesthesia: General     Procedure: Procedure(s):  DIAGNOSTIC LAPAROSCOPY, LYSIS OF ADHESIONS, REDUCTION OF INTERNAL HERNIA and reduction of small bowel obstruction    FINDINGS: Mid small bowel suspected internal hernia from omental to small bowel adhesions, no convincing evidence of inflammatory bowel disease or infectious enteritis or neoplastic process    Indications: The patient was admitted to the hospital with   recurrent partial small bowel obstruction  Discussed the risk of surgery including infection, hematoma, bleeding, recurrence or persistence of symptoms or and other risks listed in H and P,  and the risks of general anesthetic including Myocardial Infarction, Cerebrovascular Accident, sudden death, or even reaction to anesthetic medications. The patient understands the risk that the procedure could be an open procedure. The patient understands the risks, any and all questions were answered to the patient's satisfaction, and they freely signed the consent for operation. Procedure in Detail: Patient was under general anesthesia received IV cefoxitin prophylaxis 2 g, abdomen clipped of the hair prepped and draped with ChloraPrep. Florez catheter inserted by nursing staff.   Abdomen was clipped of hair prepped and draped with ChloraPrep and site verification and consent reviewed with the operative team.  A 5 mm nonbladed trocar with the 5 mm, 30 degree laparoscope was inserted into the infraumbilical area into the peritoneum under direct visualization. An additional 5 mm nonbladed trocar was placed in the left lower abdomen and an additional 5 mm nonbeta trocar placed in the left upper abdomen all under direct visualization. On initial exploration there appeared to be some scar tissue in the right and left inguinal areas consistent with possible previous laparoscopic bilateral inguinal hernia repair, there was some adhesions of the sigmoid colon in the left groin and cecum in the right groin. The patient had not reported such before hand we will discuss this with him postoperatively. The liver had some rounded edges but otherwise appeared reasonably normal, the omentum was mobilized superiorly and the stomach appeared normal what could be seen of the duodenal sweep appeared normal the right and transverse and left and sigmoid colon's appeared normal, the small bowel was examined starting at the ligament of Treitz working distally and there was some congested appearing small bowel mildly dilated but no evidence of induration or thickening or neoplastic process or mass or creeping fat and in the mid ileal area there appeared to be a relative transition zone and a small knuckle of small bowel partially caught in an internal hernia defect created by an adhesion from the omentum to the mid to distal ileum and I suspected there was an intermittent partial obstruction in this area of the small bowel from internal herniation and there was some relative pale indentation of the small bowel tissue consistent with this but no evidence of ischemia or bowel compromise in the lumen of the bowel throughout this area appeared normal in caliber with no evidence of stenosis or stricture. Just distal to this area the bowel was completely decompressed and did not appear congested. I suspected he was having internal greater or lesser degree of obstruction from internal herniation in this area.   The small bowel was reduced and the adhesions of the small bowel to the omentum were released with the 5 mm [place] LigaSure freeing the small bowel from this adhesive area to prevent any further immediate obstruction or tethering. The small bowel was further run to the ileocecal valve and all of the small bowel and cecum appeared normal the appendix appeared normal.  The small bowel was then examined retrograde from the ileocecal valve back to the ligament of Treitz with no additional findings and what appeared to be free flow of internal small bowel contents from proximal to distal without obstruction and again no evidence of inflammatory bowel disease or thickening or mass or ischemia or other surgical pathology. There was no significant blood loss less than 2 mL through the course of the procedure, all trocar sites examined, omentum return back to its normal location. In addition 20 mL quarter percent Marcaine with epinephrine used around the trocar sites and for bilateral subcostal tap block done under direct laparoscopic visualization. Carbon dioxide was evacuated, skin closed with 4-0 Monocryl subcuticular suture and all trocar sites were free of any injury or bleeding on exam internally and externally and patient awakened and taken to recovery in stable condition where Florez catheter and NG tube were to be removed. patient's life  Ruben Dudley, in addition I reviewed with her that at some point it probably would be a good idea to consider CT enterography in 3-4 weeks to make sure there is no intraluminal small bowel mass or stricture that could not be appreciated laparoscopically, although the bowel was soft and pliable and there was no evidence of mass or other strictures noted intraoperatively other than the internal hernia that was reduced and lysis of adhesions. I reviewed the above with patient's long-term female  Lázarobetirhys Dudley as per directions by patient preoperatively.     Estimated Blood Loss: Less than 2 mL    Tourniquet Time: * No tourniquets in log *      Implants: * No implants in log *            Specimens: * No specimens in log *        Drains: None                 Complications: None    Counts: Sponge and needle counts were correct times two.     Stone Diana MD

## 2022-03-18 NOTE — PERIOP NOTES
TRANSFER - OUT REPORT:    Verbal report given to Diana Franklin RN(name) on Yuri Dumas  being transferred to [facility]    (unit) for routine post - op       Report consisted of patients Situation, Background, Assessment and   Recommendations(SBAR). Information from the following report(s) OR Summary, Procedure Summary, Intake/Output and MAR was reviewed with the receiving nurse. Opportunity for questions and clarification was provided.       Patient transported with:   Monitor  O2 @ 3 liters  Registered Nurse

## 2022-03-18 NOTE — PERIOP NOTES
Consult called to Dr. Brad Soulier office to have patients stitches removed in his right foot from previous foot surgery done by Dr. Earl Irizarry.

## 2022-03-18 NOTE — PERIOP NOTES
Patient to OR Holding with Endurance catheter to left forearm. It is unclamped. We were unable to flush the site at all. Second IV started to right forearm 18 g. CRNA made aware. Patient is concerned that it is time to remove sutures from his right foot. Dr. Sherri Holder performed surgery on 3-10-22. Site is clean dry & intact. The foot does have bruising to it.   Dr. Maru Toure made aware & is ok with putting a consult in to Sherri Holder to f/u while in hospital.

## 2022-03-18 NOTE — CONSULTS
[facility]    Name:  Ariana Alcala  MR#:  [id number]  :  1940  ACCOUNT #:  [de-identified]  DATE OF SERVICE:  2022    REQUESTING PHYSICIAN:  Ellis Bynum MD    CHIEF COMPLAINT:  The patient voices no chief complaint. REASON FOR CONSULTATION:  Evaluate arrhythmia. HISTORY OF PRESENT ILLNESS:  The patient is an 64-year-old man with a history of coronary artery disease and remote stenting of the right coronary artery in . He is followed by Dr. Vin Farr and last saw Dr. Vin Farr a few months ago. No information is available regarding ejection fraction, but the patient has apparently not had any cardiac issues for many years. He has apparently changed his diet to a plant-based diet and has been doing well. He also apparently has a history of hypertension, but no diabetes. The patient was admitted with abdominal discomfort. He had an admission about a month ago with similar symptoms. He underwent surgery for small-bowel obstruction this morning and was found to have an internal hernia. He is being evaluated for possible inflammatory bowel disease. In the PACU, the patient was noted to have a wide complex rhythm with a right bundle-branch morphology and a rate of around 78 beats per minute. This was transient and the patient was hemodynamically stable. The patient currently remains sedated from the recent surgery and cannot provide any history. No other arrhythmias seen and the patient is currently in sinus rhythm. His baseline EKG does not show any obvious ischemic changes. PAST MEDICAL HISTORY:  As noted above. Also history of macular degeneration. CURRENT MEDICATIONS:  At home include,  1. Crestor. 2.  Losartan. 3.  Multivitamin. 4.  Fish oil. SURGERIES:  Recent right foot surgery, status post prior tonsillectomy. 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   He does not smoke or abuse alcohol. FAMILY HISTORY:  Noncontributory. REVIEW OF SYSTEMS:  Not obtainable given the patient's current condition. PHYSICAL EXAMINATION:  GENERAL:  Exam reveals an elderly white male, currently sedated, but in no obvious distress. VITAL SIGNS:  Blood pressure 126/65, pulse 53, respirations 16, temperature 97.7. HEENT:  Unremarkable. NECK:  Supple. No masses or thyromegaly. No cervical or supraclavicular adenopathy. No carotid bruits or JVD. CHEST:  Clear anteriorly. CARDIAC:  Regular rate and rhythm. No obvious murmurs, rubs, gallops, or clicks. ABDOMEN:  Not extensively examined as the patient is postop from abdominal surgery. No obvious abnormalities. EXTREMITIES:  No cyanosis, clubbing, or edema. Distal pulses not well palpated in the feet. NEURO:  No obvious abnormalities although exam was limited given the patient's sedation. SKIN:  Warm and dry. No rashes. LABORATORY DATA:  Hemoglobin 14.6. BUN 12, creatinine 0.7. Potassium 4.1. EKG, normal sinus rhythm, one PVC, nonspecific ST-T changes. No obvious ischemic changes. IMPRESSION:  1. Transient accelerated idioventricular rhythm in a postoperative patient following abdominal surgery, currently resolved and hemodynamically stable. 2.  History of coronary artery disease with remote stenting of the right coronary artery, unknown ejection fraction. 3.  Dyslipidemia. 4.  Hypertension. 5.  Status post abdominal surgery earlier today with possible underlying inflammatory bowel disease. 6.  Macular degeneration. RECOMMENDATIONS:  No evidence of any serious acute cardiac issue. The AIVR maybe related to electrolyte shifts or possibly general anesthesia. I would watch the patient on telemetry and I will check back with him tomorrow. Otherwise, no specific recommendations unless the patient continues to have recurrent problems. Thank you for this consult. I will follow up tomorrow.         65Charlene Larkin, MD [id number]  D:  03/18/2022 11:43  T:  03/18/2022 14:11  JOB #:  [id number]  CC:  MD Ritesh Kamara MD Parks Lees, MD

## 2022-03-18 NOTE — PERIOP NOTES
6 beat run VT noted. Strip on chart. Dr Buckley Fears at bedside. Vswnl. No chg in pt. 12 ekg unchged from 3/16. Order rec'd for telemetry monitoring on floor. Monitoring closely. 1000  12 completed. sarah in. Order to call cardiology for eval.  Pt sees [facility]. Per office, VCS on for Dr Sung Hansen. Will have  send md.    1030  Pt remains drowsy, easy to rouse. Denies pain. bp wnl. No further ectopy noted. Resting quielty. 36  Dr Mera Crane in. Pt OK to return to floor.

## 2022-03-18 NOTE — PERIOP NOTES
TRANSFER - IN REPORT:    Verbal report received from Yung Jules RN(name) on Nallely Hughes  being received from [facility](unit) for ordered procedure      Report consisted of patients Situation, Background, Assessment and   Recommendations(SBAR). Information from the following report(s) SBAR, Kardex, Intake/Output and MAR was reviewed with the receiving nurse. Opportunity for questions and clarification was provided. Assessment completed upon patients arrival to unit and care assumed.

## 2022-03-18 NOTE — PROGRESS NOTES
GI PROGRESS NOTE        NAME:   Gosia Davidson       :    1940       MRN:    [id number]     Assessment/Plan     Recurrent small bowel obstruction s/p small bowel resection 3/18/2022. Discussed with Dr. Ailyn Delacruz. The bowel did not appear to be chronically inflamed as would be expected with Crohn's disease, and it appeared to have a band-like adhesion. Will await pathology, and recommend against steroids and the pt also stated yesterday to Dr. Kimmie Hopkins he does not want steroids. Will see only upon request over the weekend. Patient Active Problem List   Diagnosis Code    Small bowel obstruction ([facility] [address] K56.609    COVID-19 U07.1    SBO (small bowel obstruction) ([facility] [address] [facility] K56.609       Subjective:   Seen & examined in PACU after surgery. He is not in pain and is very tired. Objective:     VITALS:   Last 24hrs VS reviewed since prior hospitalist progress note. Most recent are:  Visit Vitals  BP (!) 107/56   Pulse (!) 54   Temp 98.2 °F (36.8 °C)   Resp 17   Ht 5' 6\" (1.676 m)   Wt 82.6 kg (181 lb 15.8 oz)   SpO2 96%   BMI 29.37 kg/m²       Intake/Output Summary (Last 24 hours) at 3/18/2022 1755  Last data filed at 3/18/2022 0854  Gross per 24 hour   Intake 750 ml   Output 0 ml   Net 750 ml        PHYSICAL EXAM:  General   In PACU. postoperative sedation is wearing off. No distress. cv - normal rate, no murmur appreciated.  EKG at bedside with wide-complex but normal rate  resp - breathing comfortably; symmetric chest rise       Lab Data   Recent Results (from the past 12 hour(s))   EKG, 12 LEAD, INITIAL    Collection Time: 22  9:41 AM   Result Value Ref Range    Ventricular Rate 65 BPM    Atrial Rate 65 BPM    P-R Interval 170 ms    QRS Duration 102 ms    Q-T Interval 408 ms    QTC Calculation (Bezet) 424 ms    Calculated P Axis 54 degrees    Calculated R Axis -35 degrees    Calculated T Axis 72 degrees    Diagnosis       Sinus rhythm with occasional premature ventricular complexes  Left axis deviation      Confirmed by Lennox Newell ([id number]) on 3/18/2022 4:07:51 PM     EKG, 12 LEAD, INITIAL    Collection Time: 03/18/22 10:26 AM   Result Value Ref Range    Ventricular Rate 54 BPM    Atrial Rate 54 BPM    P-R Interval 172 ms    QRS Duration 102 ms    Q-T Interval 424 ms    QTC Calculation (Bezet) 402 ms    Calculated P Axis 32 degrees    Calculated R Axis -37 degrees    Calculated T Axis 66 degrees    Diagnosis       Sinus bradycardia with premature atrial complexes  Left axis deviation  Abnormal ECG    Confirmed by Lennox Newell ([id number]) on 3/18/2022 4:08:07 PM           Medications Reviewed    PM/ reviewed - no change compared to H&P  Attending Physician: Evan Shane MD   Date/Time:  3/18/2022    ________________________________________________________________________

## 2022-03-18 NOTE — PROGRESS NOTES
Op findings, CV consult reviewed with pt and [employer], and post op plans expectations, limitatiosn, and rec CT Enterography several weeks, order placed.

## 2022-03-18 NOTE — ANESTHESIA POSTPROCEDURE EVALUATION
Procedure(s):  DIAGNOSTIC LAPAROSCOPY, LYSIS OF ADHESIONS, REDUCTION OF INTERNAL HERNIA.    general    Anesthesia Post Evaluation      Multimodal analgesia: multimodal analgesia used between 6 hours prior to anesthesia start to PACU discharge  Patient location during evaluation: PACU  Patient participation: complete - patient participated  Level of consciousness: sleepy but conscious and responsive to verbal stimuli  Pain score: 3  Pain management: adequate  Airway patency: patent  Anesthetic complications: no  Cardiovascular status: acceptable  Respiratory status: acceptable  Hydration status: acceptable  Comments: +Post-Anesthesia Evaluation and Assessment    Patient: Irene Kaplan MRN: [id number]  SSN: [id number]   YOB: 1940  Age: [de-identified] y.o. Sex: male      Cardiovascular Function/Vital Signs    /62   Pulse (!) 54   Temp 36.5 °C (97.7 °F)   Resp 15   Ht 5' 6\" (1.676 m)   Wt 82.6 kg (181 lb 15.8 oz)   SpO2 96%   BMI 29.37 kg/m²     Patient is status post Procedure(s):  DIAGNOSTIC LAPAROSCOPY, LYSIS OF ADHESIONS, REDUCTION OF INTERNAL HERNIA. Nausea/Vomiting: Controlled. Postoperative hydration reviewed and adequate. Pain:  Pain Scale 1: Numeric (0 - 10) (03/18/22 1115)  Pain Intensity 1: 0 (03/18/22 1115)   Managed. Neurological Status:   Neuro (WDL): Exceptions to WDL (03/18/22 0855)   At baseline. Mental Status and Level of Consciousness: Arousable. Pulmonary Status:   O2 Device: Nasal cannula (03/18/22 0855)   Adequate oxygenation and airway patent. Complications related to anesthesia: None    Post-anesthesia assessment completed. No concerns.     Signed By: Zheng Guerrero MD    3/18/2022  Post anesthesia nausea and vomiting:  controlled  Final Post Anesthesia Temperature Assessment:  Normothermia (36.0-37.5 degrees C)      INITIAL Post-op Vital signs:   Vitals Value Taken Time   /65 03/18/22 1130   Temp     Pulse 56 03/18/22 1136   Resp 13 03/18/22 1136 SpO2 95 % 03/18/22 1136   Vitals shown include unvalidated device data.

## 2022-03-18 NOTE — PROGRESS NOTES
No change in H and P and exam, pt with SBO and pt elects surgical intervention as outlined prev.  Understanding B/R/A morbidity and mortality

## 2022-03-18 NOTE — PERIOP NOTES
Handoff Report from Operating Room to PACU    Report received from Pj Espinal RN and Ho Mcwilliams CRNA regarding Minus Parquinton. Surgeon(s):  Aroldo Carlisle MD  And Procedure(s) (LRB):  DIAGNOSTIC LAPAROSCOPY, LYSIS OF ADHESIONS, REDUCTION OF INTERNAL HERNIA (N/A)  confirmed   with dressings discussed. Anesthesia type, drugs, patient history, complications, estimated blood loss, vital signs, intake and output, and last pain medication, lines, reversal medications and temperature were reviewed.

## 2022-03-18 NOTE — PROGRESS NOTES
Patient with some wide-complex EKG tracings, cardiology was consulted to assess patient who has had a history of stents, have notified patient's life  Sarah Perez, in addition I reviewed with her that at some point it probably would be a good idea to consider CT enterography in 3-4 weeks to make sure there is no intraluminal small bowel mass or stricture that could not be appreciated laparoscopically, although the bowel was soft and pliable and there was no evidence of mass or other strictures noted intraoperatively other than the internal hernia that was reduced and lysis of adhesions.

## 2022-03-18 NOTE — PROGRESS NOTES
Transition of Care Plan:     RUR: 11% low risk for readmission   Disposition: Home with significant other   Follow up appointments: PCP, Surgery  DME needed: Pt owns a walker and cane if needed, not currently using  Transportation at Discharge: Pt's significant other, Teddy Chaveztingham or means to access home: Yes has access       IM Medicare Letter: Will need 2nd [facility] letter prior to d/c  Is patient a BCPI-A Bundle:  N/A                   If yes, was Bundle Letter given?:    Is patient a  and connected with the [place]? N/A               If yes, was [employer] transfer form completed and [facility] notified? Caregiver Contact: Pt's significant other, Chanelle James) [phone]  Discharge Caregiver contacted prior to discharge? To be contacted  Care Conference needed?: Not currently indicated      Reviewed pt's chart. Discharge plan discussed during IDR. Anticipate pt discharging home with family assistance & follow-up appts. SHANE unknown at this time. Currently NPO; will need to advance his diet & have return of bowel function prior to discharge. Will follow.     LINUS Clayton  Care Management

## 2022-03-19 PROBLEM — K56.609 SMALL BOWEL OBSTRUCTION (HCC): Status: ACTIVE | Noted: 2022-02-08

## 2022-03-19 LAB
ANION GAP SERPL CALC-SCNC: 4 MMOL/L (ref 5–15)
BUN SERPL-MCNC: 13 MG/DL (ref 6–20)
BUN/CREAT SERPL: 21 (ref 12–20)
CALCIUM SERPL-MCNC: 8.2 MG/DL (ref 8.5–10.1)
CHLORIDE SERPL-SCNC: 109 MMOL/L (ref 97–108)
CO2 SERPL-SCNC: 27 MMOL/L (ref 21–32)
CREAT SERPL-MCNC: 0.61 MG/DL (ref 0.7–1.3)
ERYTHROCYTE [DISTWIDTH] IN BLOOD BY AUTOMATED COUNT: 13 % (ref 11.5–14.5)
GLUCOSE SERPL-MCNC: 112 MG/DL (ref 65–100)
HCT VFR BLD AUTO: 40 % (ref 36.6–50.3)
HGB BLD-MCNC: 13.1 G/DL (ref 12.1–17)
MCH RBC QN AUTO: 29.9 PG (ref 26–34)
MCHC RBC AUTO-ENTMCNC: 32.8 G/DL (ref 30–36.5)
MCV RBC AUTO: 91.3 FL (ref 80–99)
NRBC # BLD: 0 K/UL (ref 0–0.01)
NRBC BLD-RTO: 0 PER 100 WBC
PLATELET # BLD AUTO: 165 K/UL (ref 150–400)
PMV BLD AUTO: 9.8 FL (ref 8.9–12.9)
POTASSIUM SERPL-SCNC: 3.8 MMOL/L (ref 3.5–5.1)
RBC # BLD AUTO: 4.38 M/UL (ref 4.1–5.7)
SODIUM SERPL-SCNC: 140 MMOL/L (ref 136–145)
WBC # BLD AUTO: 8.1 K/UL (ref 4.1–11.1)

## 2022-03-19 PROCEDURE — 65270000029 HC RM PRIVATE

## 2022-03-19 PROCEDURE — 74011250636 HC RX REV CODE- 250/636: Performed by: SURGERY

## 2022-03-19 PROCEDURE — 36415 COLL VENOUS BLD VENIPUNCTURE: CPT

## 2022-03-19 PROCEDURE — 80048 BASIC METABOLIC PNL TOTAL CA: CPT

## 2022-03-19 PROCEDURE — 74011000250 HC RX REV CODE- 250: Performed by: SURGERY

## 2022-03-19 PROCEDURE — 85027 COMPLETE CBC AUTOMATED: CPT

## 2022-03-19 RX ADMIN — SODIUM CHLORIDE, PRESERVATIVE FREE 10 ML: 5 INJECTION INTRAVENOUS at 21:49

## 2022-03-19 RX ADMIN — LORAZEPAM 1 MG: 2 INJECTION INTRAMUSCULAR; INTRAVENOUS at 21:49

## 2022-03-19 RX ADMIN — ENOXAPARIN SODIUM 40 MG: 100 INJECTION SUBCUTANEOUS at 10:18

## 2022-03-19 RX ADMIN — SODIUM CHLORIDE, PRESERVATIVE FREE 10 ML: 5 INJECTION INTRAVENOUS at 14:00

## 2022-03-19 RX ADMIN — SODIUM CHLORIDE, PRESERVATIVE FREE 10 ML: 5 INJECTION INTRAVENOUS at 05:33

## 2022-03-19 RX ADMIN — POTASSIUM CHLORIDE, DEXTROSE MONOHYDRATE AND SODIUM CHLORIDE 125 ML/HR: 150; 5; 450 INJECTION, SOLUTION INTRAVENOUS at 21:51

## 2022-03-19 NOTE — PROGRESS NOTES
Admit Date: 3/16/2022    POD 1 Day Post-Op    Procedure:  Procedure(s):  DIAGNOSTIC LAPAROSCOPY, LYSIS OF ADHESIONS, REDUCTION OF INTERNAL HERNIA    Subjective:     Patient has no new complaints. No flatus or BM yet    Objective:     Blood pressure (!) 142/57, pulse (!) 55, temperature 97.9 °F (36.6 °C), resp. rate 18, height 5' 6\" (1.676 m), weight 181 lb 15.8 oz (82.6 kg), SpO2 98 %.     Temp (24hrs), Av.3 °F (36.8 °C), Min:97.9 °F (36.6 °C), Max:99.1 °F (37.3 °C)      Physical Exam:  GENERAL: alert, cooperative, no distress, appears stated age, LUNG: clear to auscultation bilaterally, HEART: regular rate and rhythm, ABDOMEN: soft, NT, wounds c/d/i, EXTREMITIES:  extremities normal, atraumatic, no cyanosis or edema    Labs:   Recent Results (from the past 24 hour(s))   METABOLIC PANEL, BASIC    Collection Time: 22  4:22 AM   Result Value Ref Range    Sodium 140 136 - 145 mmol/L    Potassium 3.8 3.5 - 5.1 mmol/L    Chloride 109 (H) 97 - 108 mmol/L    CO2 27 21 - 32 mmol/L    Anion gap 4 (L) 5 - 15 mmol/L    Glucose 112 (H) 65 - 100 mg/dL    BUN 13 6 - 20 MG/DL    Creatinine 0.61 (L) 0.70 - 1.30 MG/DL    BUN/Creatinine ratio 21 (H) 12 - 20      GFR est AA >60 >60 ml/min/1.73m2    GFR est non-AA >60 >60 ml/min/1.73m2    Calcium 8.2 (L) 8.5 - 10.1 MG/DL   CBC W/O DIFF    Collection Time: 22  4:22 AM   Result Value Ref Range    WBC 8.1 4.1 - 11.1 K/uL    RBC 4.38 4.10 - 5.70 M/uL    HGB 13.1 12.1 - 17.0 g/dL    HCT 40.0 36.6 - 50.3 %    MCV 91.3 80.0 - 99.0 FL    MCH 29.9 26.0 - 34.0 PG    MCHC 32.8 30.0 - 36.5 g/dL    RDW 13.0 11.5 - 14.5 %    PLATELET 452 616 - 413 K/uL    MPV 9.8 8.9 - 12.9 FL    NRBC 0.0 0  WBC    ABSOLUTE NRBC 0.00 0.00 - 0.01 K/uL       Data Review images and reports reviewed    Assessment:     Active Problems:    SBO (small bowel obstruction) (HCC) (3/16/2022)        Plan/Recommendations/Medical Decision Making:     Continue present treatment   Clear liquid diet  Increase activity  Will need CT enterography as outpatient     Mady Rosas MD  [facility]

## 2022-03-19 NOTE — PROGRESS NOTES
Problem: Falls - Risk of  Goal: *Absence of Falls  Description: Document Jacob Marshall Fall Risk and appropriate interventions in the flowsheet.   Outcome: Progressing Towards Goal  Note: Fall Risk Interventions:                      History of Falls Interventions: Bed/chair exit alarm

## 2022-03-19 NOTE — PROGRESS NOTES
Cardiology Progress Note      3/19/2022 12:09 PM    Admit Date: 3/16/2022          Subjective: Stable overnight. No AIVR. No other c/o          Visit Vitals  BP (!) 142/57 (BP 1 Location: Left upper arm, BP Patient Position: At rest)   Pulse (!) 55   Temp 97.9 °F (36.6 °C)   Resp 18   Ht 5' 6\" (1.676 m)   Wt 82.6 kg (181 lb 15.8 oz)   SpO2 98%   BMI 29.37 kg/m²     03/17 1901 - 03/19 0700  In: 750 [I.V.:750]  Out: 0         Objective:      Physical Exam:  VS as above  Chest CTA  Card RRR no gallop     Data Review:   Labs:      Hgb 13.1  BUN 13  Creat 0.6     Telemetry: SR, some PVCs, no AIVR       Assessment:     1. Transient accelerated idioventricular rhythm in a postoperative patient following abdominal surgery, currently resolved and hemodynamically stable. 2.  History of coronary artery disease with remote stenting of the right coronary artery, unknown ejection fraction. 3.  Dyslipidemia. 4.  Hypertension. 5.  Status post abdominal surgery earlier today with possible underlying inflammatory bowel disease. 6.  Macular degeneration.     Plan: Cont telemetry and resume prior cardiac meds as tolerated.  No additional recc

## 2022-03-20 PROBLEM — U07.1 COVID-19: Status: ACTIVE | Noted: 2022-02-08

## 2022-03-20 PROCEDURE — 65270000029 HC RM PRIVATE

## 2022-03-20 PROCEDURE — 74011250636 HC RX REV CODE- 250/636: Performed by: SURGERY

## 2022-03-20 PROCEDURE — 74011000250 HC RX REV CODE- 250: Performed by: SURGERY

## 2022-03-20 RX ORDER — LOSARTAN POTASSIUM 25 MG/1
25 TABLET ORAL DAILY
Status: DISCONTINUED | OUTPATIENT
Start: 2022-03-21 | End: 2022-03-20 | Stop reason: SDUPTHER

## 2022-03-20 RX ORDER — HYDROCODONE BITARTRATE AND ACETAMINOPHEN 5; 325 MG/1; MG/1
1-2 TABLET ORAL
Status: DISCONTINUED | OUTPATIENT
Start: 2022-03-20 | End: 2022-03-22 | Stop reason: HOSPADM

## 2022-03-20 RX ORDER — KETOROLAC TROMETHAMINE 30 MG/ML
15 INJECTION, SOLUTION INTRAMUSCULAR; INTRAVENOUS
Status: DISCONTINUED | OUTPATIENT
Start: 2022-03-20 | End: 2022-03-22 | Stop reason: HOSPADM

## 2022-03-20 RX ORDER — DEXTROSE, SODIUM CHLORIDE, AND POTASSIUM CHLORIDE 5; .45; .15 G/100ML; G/100ML; G/100ML
100 INJECTION INTRAVENOUS CONTINUOUS
Status: DISCONTINUED | OUTPATIENT
Start: 2022-03-20 | End: 2022-03-22 | Stop reason: HOSPADM

## 2022-03-20 RX ORDER — LOSARTAN POTASSIUM 25 MG/1
25 TABLET ORAL DAILY
Status: DISCONTINUED | OUTPATIENT
Start: 2022-03-21 | End: 2022-03-22 | Stop reason: HOSPADM

## 2022-03-20 RX ADMIN — LORAZEPAM 1 MG: 2 INJECTION INTRAMUSCULAR; INTRAVENOUS at 21:06

## 2022-03-20 RX ADMIN — SODIUM CHLORIDE, PRESERVATIVE FREE 10 ML: 5 INJECTION INTRAVENOUS at 21:06

## 2022-03-20 RX ADMIN — POTASSIUM CHLORIDE, DEXTROSE MONOHYDRATE AND SODIUM CHLORIDE 125 ML/HR: 150; 5; 450 INJECTION, SOLUTION INTRAVENOUS at 15:15

## 2022-03-20 RX ADMIN — ENOXAPARIN SODIUM 40 MG: 100 INJECTION SUBCUTANEOUS at 13:00

## 2022-03-20 RX ADMIN — SODIUM CHLORIDE, PRESERVATIVE FREE 10 ML: 5 INJECTION INTRAVENOUS at 15:16

## 2022-03-20 RX ADMIN — SODIUM CHLORIDE, PRESERVATIVE FREE 10 ML: 5 INJECTION INTRAVENOUS at 06:33

## 2022-03-20 RX ADMIN — POTASSIUM CHLORIDE, DEXTROSE MONOHYDRATE AND SODIUM CHLORIDE 100 ML/HR: 150; 5; 450 INJECTION, SOLUTION INTRAVENOUS at 19:00

## 2022-03-20 NOTE — PROGRESS NOTES
Cardiology Progress Note      3/20/2022 12:39 PM    Admit Date: 3/16/2022          Subjective: Up in chair. Remains on liquid diet. No SOB, dizziness, CP         Visit Vitals  BP (!) 148/51 (BP 1 Location: Right upper arm, BP Patient Position: At rest)   Pulse (!) 49   Temp 97.7 °F (36.5 °C)   Resp 16   Ht 5' 6\" (1.676 m)   Wt 82.6 kg (181 lb 15.8 oz)   SpO2 100%   BMI 29.37 kg/m²     03/18 1901 - 03/20 0700  In: -   Out: 1050 [Urine:1050]        Objective:      Physical Exam:  VS as above     Data Review:   Labs:  No results found for this or any previous visit (from the past 24 hour(s)). Telemetry: SR with occas PVCs. No AIVR       Assessment:     1.  Transient accelerated idioventricular rhythm in a postoperative patient following abdominal surgery, currently resolved and hemodynamically stable. 2.  History of coronary artery disease with remote stenting of the right coronary artery, unknown ejection fraction. 3.  Dyslipidemia. 4.  Hypertension. 5.  Status post abdominal surgery earlier today with possible underlying inflammatory bowel disease. 6.  Macular degeneration. 7. PVCs     Plan: Will resume losartan. Can restart Crestor at d/c.  Dr Vin Farr will f/u tomm if he is still an inpt

## 2022-03-20 NOTE — PROGRESS NOTES
Admit Date: 3/16/2022    POD 2 Day Post-Op    Procedure:  Procedure(s):  DIAGNOSTIC LAPAROSCOPY, LYSIS OF ADHESIONS, REDUCTION OF INTERNAL HERNIA    Subjective:     Patient has no new complaints. + frequent flatus but no BM yet. Objective:     Blood pressure (!) 148/51, pulse (!) 49, temperature 97.7 °F (36.5 °C), resp. rate 16, height 5' 6\" (1.676 m), weight 181 lb 15.8 oz (82.6 kg), SpO2 100 %. Temp (24hrs), Av.2 °F (36.8 °C), Min:97.7 °F (36.5 °C), Max:98.4 °F (36.9 °C)      Physical Exam:  GENERAL: alert, cooperative, no distress, appears stated age, LUNG: clear to auscultation bilaterally, HEART: regular rate and rhythm, ABDOMEN: soft, NT, wounds c/d/i, EXTREMITIES:  extremities normal, atraumatic, no cyanosis or edema    Labs:   No results found for this or any previous visit (from the past 24 hour(s)).     Data Review images and reports reviewed    Assessment:     Active Problems:    SBO (small bowel obstruction) ([address] (3/16/2022)        Plan/Recommendations/Medical Decision Making:     Continue present treatment   Regular diet  Increase activity  Home once having BMs  Will need CT enterography as outpatient     Gaines Lefort, MD  [facility]

## 2022-03-21 ENCOUNTER — APPOINTMENT (OUTPATIENT)
Dept: GENERAL RADIOLOGY | Age: 82
DRG: 337 | End: 2022-03-21
Attending: NURSE PRACTITIONER
Payer: MEDICARE

## 2022-03-21 PROCEDURE — 74011250636 HC RX REV CODE- 250/636: Performed by: SURGERY

## 2022-03-21 PROCEDURE — 74011000250 HC RX REV CODE- 250: Performed by: SURGERY

## 2022-03-21 PROCEDURE — 65270000029 HC RM PRIVATE

## 2022-03-21 PROCEDURE — 74019 RADEX ABDOMEN 2 VIEWS: CPT

## 2022-03-21 PROCEDURE — 74011250637 HC RX REV CODE- 250/637: Performed by: SURGERY

## 2022-03-21 PROCEDURE — 74011250637 HC RX REV CODE- 250/637: Performed by: NURSE PRACTITIONER

## 2022-03-21 RX ORDER — POLYETHYLENE GLYCOL 3350 17 G/17G
17 POWDER, FOR SOLUTION ORAL ONCE
Status: COMPLETED | OUTPATIENT
Start: 2022-03-21 | End: 2022-03-21

## 2022-03-21 RX ADMIN — SODIUM CHLORIDE, PRESERVATIVE FREE 10 ML: 5 INJECTION INTRAVENOUS at 21:30

## 2022-03-21 RX ADMIN — POLYETHYLENE GLYCOL 3350 17 G: 17 POWDER, FOR SOLUTION ORAL at 10:07

## 2022-03-21 RX ADMIN — LOSARTAN POTASSIUM 25 MG: 25 TABLET, FILM COATED ORAL at 10:06

## 2022-03-21 RX ADMIN — POTASSIUM CHLORIDE, DEXTROSE MONOHYDRATE AND SODIUM CHLORIDE 100 ML/HR: 150; 5; 450 INJECTION, SOLUTION INTRAVENOUS at 10:09

## 2022-03-21 RX ADMIN — SODIUM CHLORIDE, PRESERVATIVE FREE 10 ML: 5 INJECTION INTRAVENOUS at 06:33

## 2022-03-21 RX ADMIN — LORAZEPAM 1 MG: 2 INJECTION INTRAMUSCULAR; INTRAVENOUS at 21:30

## 2022-03-21 NOTE — PROGRESS NOTES
Admit Date: 3/16/2022    POD 3 Day Post-Op    Procedure:  Procedure(s):  DIAGNOSTIC LAPAROSCOPY, LYSIS OF ADHESIONS, REDUCTION OF INTERNAL HERNIA    Subjective:     Patient has no new complaints. + frequent flatus but no BM yet. Discouraged that he has not yet had a BM    Objective:     Blood pressure (!) 147/55, pulse (!) 56, temperature 98.5 °F (36.9 °C), resp. rate 16, height 5' 6\" (1.676 m), weight 181 lb 15.8 oz (82.6 kg), SpO2 96 %. Temp (24hrs), Av.4 °F (36.9 °C), Min:98.2 °F (36.8 °C), Max:98.5 °F (36.9 °C)      Physical Exam:  GENERAL: alert, cooperative, no distress, appears stated age, LUNG: clear to auscultation bilaterally, HEART: regular rate and rhythm, ABDOMEN: soft, NT, wounds c/d/i, EXTREMITIES:  extremities normal, atraumatic, no cyanosis or edema    Labs:   No results found for this or any previous visit (from the past 24 hour(s)).     Data Review images and reports reviewed    Assessment:     Active Problems:    SBO (small bowel obstruction) ([address] (3/16/2022)        Plan/Recommendations/Medical Decision Making:     Continue present treatment   Contrast challenge today to confirm patency  Home once having BMs  Will need CT enterography as outpatient     Devorah Reeves MD  [id number] [facility]

## 2022-03-21 NOTE — PROGRESS NOTES
End of Shift Note    Bedside shift change report given to Merly Russo (oncoming nurse) by Elmer Ann RN (offgoing nurse).   Report included the following information SBAR, Kardex, Intake/Output, MAR and Recent Results    Shift worked:  7A-7P     Shift summary and any significant changes:     NONE     Concerns for physician to address:  NONE     Zone phone for oncoming shift:   DEREK Barry

## 2022-03-21 NOTE — PROGRESS NOTES
Cardiology Progress Note  3/21/2022 9:23 AM  Admit Date: 3/16/2022  Admit Diagnosis/CC: SBO (small bowel obstruction) ([facility] [K56.609]  Subjective:   Patient reports:  Chest Pain:  [x] none,  consistent with [] non-cardiac  [] atypical  []  anginal chest pain             [x] none now    []  on-going  Dyspnea: [x] none  [] at rest  [] with exertion  [] improved  [] unchanged [] worsening  PND:       [x] none  [] overnight   [] current  Orthopnea: [x] none  [] improved  [] unchanged  [] worsening  Presyncope: [x] none  [] improved  [] unchanged  [] worsening  Ambulated in hallway without symptoms  [] Yes  Ambulated in room without symptoms  [x] Yes  ROS(2+other systems)   Hematuria: [] Yes  [x] No.   Dysuria: [] Yes  [x] No                                           Cough:       [] Yes  [x] No.   Sputum: [] Yes  [x] No                                            Hematochezia: [] Yes  [x] No.   Melena: [] Yes  [x] No                                            No change in family and social history from H&P/Consult note.     Current Facility-Administered Medications   Medication Dose Route Frequency    polyethylene glycol (MIRALAX) packet 17 g  17 g Oral ONCE    diatrizoate eveline-diatrizoat sod (MD-GASTROVIEW,GASTROGRAFIN) 66-10 % contrast solution 80 mL  80 mL Oral ONCE    losartan (COZAAR) tablet 25 mg  25 mg Oral DAILY    HYDROcodone-acetaminophen (NORCO) 5-325 mg per tablet 1-2 Tablet  1-2 Tablet Oral Q4H PRN    ketorolac (TORADOL) injection 15 mg  15 mg IntraVENous Q6H PRN    dextrose 5% - 0.45% NaCl with KCl 20 mEq/L infusion  100 mL/hr IntraVENous CONTINUOUS    enoxaparin (LOVENOX) injection 40 mg  40 mg SubCUTAneous Q24H    sodium chloride (NS) flush 5-40 mL  5-40 mL IntraVENous Q8H    sodium chloride (NS) flush 5-40 mL  5-40 mL IntraVENous PRN    acetaminophen (TYLENOL) tablet 650 mg  650 mg Oral Q6H PRN    naloxone (NARCAN) injection 0.4 mg  0.4 mg IntraVENous PRN    ondansetron (ZOFRAN) injection 4 mg 4 mg IntraVENous Q4H PRN    diphenhydrAMINE (BENADRYL) injection 25 mg  25 mg IntraVENous Q6H PRN    morphine injection 2 mg  2 mg IntraVENous Q2H PRN    LORazepam (ATIVAN) injection 1 mg  1 mg IntraVENous Q6H PRN    hydrALAZINE (APRESOLINE) 20 mg/mL injection 10 mg  10 mg IntraVENous Q6H PRN    benzocaine-menthoL (CHLORASEPTIC MAX) lozenge 1 Lozenge  1 Lozenge Oral Q2H PRN       Objective:    Physical Exam:  Last VS:   Visit Vitals  BP (!) 147/55 (BP 1 Location: Left upper arm, BP Patient Position: Lying right side)   Pulse (!) 56   Temp 98.5 °F (36.9 °C)   Resp 16   Ht 5' 6\" (1.676 m)   Wt 82.6 kg (181 lb 15.8 oz)   SpO2 96%   BMI 29.37 kg/m²    Temp (24hrs), Av.4 °F (36.9 °C), Min:98.2 °F (36.8 °C), Max:98.5 °F (36.9 °C)    24 hr VS reviewed. General Appearance:   [x] well developed, well nourished,  [x] NAD. [] agitated   [] lethargic but arousable  [] obtunded   ENT, Palate:    [x] WNL   [] dry palate/MM        Respiratory:    [x] CTA bilateral  [] rales  [] rhonchi  [] normal resp effort      [] similar to yesterday   [] worse    [] improved   Cardiovascular:   [x] RRR   [] Irregular rate and rhythm   [x] Normal S1, S2   [x] No gallop or rub. [] no murmur   [x] no new murmur  [] murmur c/w:   [x] no edema  RLE: []1+ []2+ [] 3+;  LLE: []1+ []2+ []3+      [] edema similar to yesterday   [] worse    [] improved   [x] normal JVP   [] Elevated JVP    [x] JVP similar to yesterday   [] worse    [] improved   [x] carotid upstroke unchanged   [x] abd aorta not palpated   [x] no stigmata of peripheral emboli   GI:    [x] abd soft, non-distented,bowel sounds present, no                     organomegaly appreciated   Skin:  Neuro:    Cath Site:  [x] warm and dry [] cold extremities   [x] A/O x 3, grossly non-focal      [] intact w/o hematoma or bruit; distal pulse unchanged. Data Review:   Labs:  No results found for this or any previous visit (from the past 24 hour(s)).     Provided Telemetry: [x] sinus  [] chronic afib   [] paroxysmal afib  [] NSVT      Assessment:     Active Problems:    SBO (small bowel obstruction) ([address] (3/16/2022)        Plan:     Hypertension NOS  improved   Continue current therapy  He is concerned about no BM yet. For all other plans, see orders.    [x] High complexity decision making was performed

## 2022-03-22 ENCOUNTER — TRANSCRIBE ORDER (OUTPATIENT)
Dept: SCHEDULING | Age: 82
End: 2022-03-22

## 2022-03-22 VITALS
DIASTOLIC BLOOD PRESSURE: 67 MMHG | TEMPERATURE: 98 F | SYSTOLIC BLOOD PRESSURE: 132 MMHG | RESPIRATION RATE: 18 BRPM | WEIGHT: 181.99 LBS | HEART RATE: 51 BPM | OXYGEN SATURATION: 98 % | BODY MASS INDEX: 29.25 KG/M2 | HEIGHT: 66 IN

## 2022-03-22 DIAGNOSIS — K52.9 ILEITIS: Primary | ICD-10-CM

## 2022-03-22 DIAGNOSIS — K56.609 SMALL BOWEL OBSTRUCTION (HCC): ICD-10-CM

## 2022-03-22 PROCEDURE — 74011000250 HC RX REV CODE- 250: Performed by: SURGERY

## 2022-03-22 RX ADMIN — SODIUM CHLORIDE, PRESERVATIVE FREE 10 ML: 5 INJECTION INTRAVENOUS at 06:55

## 2022-03-22 NOTE — DISCHARGE INSTRUCTIONS
Bowel Blockage (Intestinal Obstruction): Care Instructions  Your Care Instructions  A bowel blockage, also called an intestinal obstruction, can prevent gas, fluids, or solids from moving through the intestines normally. It can cause constipation and, rarely, diarrhea. You may have pain, nausea, vomiting, and cramping. Most of the time, complete blockages require a stay in the hospital and possibly surgery. But if your bowel is only partly blocked, your doctor may tell you to wait until it clears on its own and you are able to pass gas and stool. If so, there are things you can do at home to help make you feel better. If you have had surgery for a bowel blockage, there are things you can do at home to make sure you heal well. You can also make some changes to keep your bowel from becoming blocked again. Follow-up care is a key part of your treatment and safety. Be sure to make and go to all appointments, and call your doctor if you are having problems. It's also a good idea to know your test results and keep a list of the medicines you take. How can you care for yourself at home? If your doctor has told you to wait at home for a blockage to clear on its own:  · Follow your doctor's instructions. These may include eating a liquid diet to avoid complete blockage. · Be safe with medicines. Take your medicines exactly as prescribed. Call your doctor if you think you are having a problem with your medicine. · Put a heating pad set on low on your belly to relieve mild cramps and pain. To prevent another blockage  · Try to eat smaller amounts of food more often. For example, have 5 or 6 small meals throughout the day instead of 2 or 3 large meals. · Chew your food very well. Try to chew each bite about 20 times or until it is liquid. · Avoid high-fiber foods and raw fruits and vegetables with skins, husks, strings, or seeds.  These can form a ball of undigested material that can cause a blockage if a part of your bowel is scarred or narrowed. · Check with your doctor before you eat whole-grain products or use a fiber supplement such as Citrucel or Metamucil. · To help you have regular bowel movements, eat at regular times, do not strain during a bowel movement, and drink at least 8 to 10 glasses of water each day. If you have kidney, heart, or liver disease and have to limit fluids, talk with your doctor or before you increase the amount of fluids you drink. · Drink high-calorie liquid formulas if your doctor says to. Severe symptoms may make it hard for your body to take in vitamins and minerals. · Get regular exercise. It helps you digest your food better. Get at least 30 minutes of physical activity on most days of the week. Walking is a good choice. When should you call for help? Call your doctor now or seek immediate medical care if:  ? · You have a fever. ? · You are vomiting. ? · You have new or worse belly pain. ? · You cannot pass stools or gas. ? Watch closely for changes in your health, and be sure to contact your doctor if you have any problems. Where can you learn more? Go to http://www.gray.com/. Enter U684 in the search box to learn more about \"Bowel Blockage (Intestinal Obstruction): Care Instructions. \"  Current as of: May 12, 2017  Content Version: 11.4  © 4109-3117 [employer]. Care instructions adapted under license by [employer] (which disclaims liability or warranty for this information).  If you have questions about a medical condition or this instruction, always ask your healthcare professional. [place] [address] any warranty or liability for your use of this information.

## 2022-03-22 NOTE — PROGRESS NOTES
Ready from CM standpoint. CM has completed SMAART tool & placed on pt's door for RN to complete. Transition of Care Plan:     RUR: 10% \"low risk\"  Disposition: Home with follow-up appts   Follow up appointments: PCP, Surgery. GI- details in AVS  DME needed: None  Transportation at Discharge: S/O, eta 9:30 AM  [place] or means to access home: Yes        Medicare Letter: Reviewed  Is patient a BCPI-A Bundle:  No                   If yes, was Bundle Letter given?:    Is patient a [place] and connected with the [address] [place] [address]  If yes, was [place] transfer form completed and [facility] notified? Caregiver Contact: Pt's significant other, Zora Delgado [phone]  Discharge Caregiver contacted prior to discharge?     CM acknowledged discharge. Reviewed pt's chart. Secured PCP & specialists appts; details in AVS. CM met with pt at bedside to review [facility] plan for discharge re: home with follow-up appts. Pt verbalized understanding & is agreeable. Reports no questions or concerns for discharge at this time. S/o to transport pt home at discharge; eta 9:30 AM.    No further needs. Care Management Interventions  PCP Verified by CM: Yes  Palliative Care Criteria Met (RRAT>21 & CHF Dx)?: No  Mode of Transport at Discharge:  Other (see comment) (S/o- [facility] Transport Time of Discharge: 0930  Transition of Care Consult (CM Consult): Discharge Planning  Discharge Durable Medical Equipment: No  Physical Therapy Consult: No  Occupational Therapy Consult: No  Speech Therapy Consult: No  Support Systems: Spouse/Significant Other  Confirm Follow Up Transport: Self  The Plan for Transition of Care is Related to the Following Treatment Goals : Home with follow-up appts  The Patient and/or Patient Representative was Provided with a Choice of Provider and Agrees with the Discharge Plan?: Yes  Discharge Location  Patient Expects to be Discharged to[de-identified] [facility], MSW  Care Management

## 2022-03-22 NOTE — PROGRESS NOTES
Hospital follow-up PCP transitional care appointment has been scheduled with NP Donny Medley on 3/28/22 at 0930. Pending patient discharge.   Tan Feliciano, Care Management Assistant

## 2022-03-22 NOTE — PROGRESS NOTES
Bedside and Verbal shift change report given to DEREK Rodriguez (oncoming nurse) by Rafael Guardado (offgoing nurse). Report included the following information SBAR, Kardex, ED Summary, Intake/Output, MAR and Recent Results.

## 2022-03-22 NOTE — PROGRESS NOTES
Problem: Falls - Risk of  Goal: *Absence of Falls  Description: Document Cynthia Don Fall Risk and appropriate interventions in the flowsheet.   Outcome: Resolved/Met  Note: Fall Risk Interventions:            Medication Interventions: Patient to call before getting OOB         History of Falls Interventions: Room close to nurse's station         Problem: Patient Education: Go to Patient Education Activity  Goal: Patient/Family Education  Outcome: Resolved/Met

## 2022-03-22 NOTE — DISCHARGE SUMMARY
Post- Surgical Discharge Summary    Patient ID:  Ignacia Leblanc  [id number]  male  [de-identified] y.o.  1940    Admit date: 3/16/2022    Discharge date: 03/22/22     Admitting Physician: Hernando Tang MD     Consulting Physician(s):   Treatment Team: Attending Provider: Sania Daugherty MD; Consulting Provider: Srinivasa Boles MD; Consulting Provider: CHATO Harrison; Utilization Review: Marcela Trevino; Utilization Review: Khang Baker; Consulting Provider: Bobbette Bamberger, DPM; Consulting Provider: Jenniffer Hendrix MD; Physician: Gaston Daley MD; Care Manager: [facility]; Staff Nurse: Vladimir Olivo, RN; Staff Nurse: Huber Lange RN    Date of Surgery:   3/18/2022     Preoperative Diagnosis:  unknown    Postoperative Diagnosis:   Small bowel obstruction, internal hernia    Procedure(s):  DIAGNOSTIC LAPAROSCOPY, LYSIS OF ADHESIONS, REDUCTION OF INTERNAL HERNIA     Anesthesia Type:   General     Surgeon: Lilibeth Galvez MD                            HPI:  Pt is a [de-identified] y.o. male who has a history of unknown who presents at this time for a SBO/Ileitis following the failure of conservative management. Problem List:   Problem List as of 3/22/2022 Date Reviewed: 3/17/2022          Codes Class Noted - Resolved    SBO (small bowel obstruction) ([facility] ICD-10-CM: X32.207  ICD-9-CM: 560.9  3/16/2022 - Present        Small bowel obstruction ([facility] ICD-10-CM: U28.885  ICD-9-CM: 560.9  2/8/2022 - Present        COVID-19 ICD-10-CM: U07.1  ICD-9-CM: 079.89  2/8/2022 - Present        RESOLVED: Metatarsalgia, right foot ICD-10-CM: M77.41  ICD-9-CM: 726.70  7/18/2019 - 7/18/2019        RESOLVED: Olya Geovanna toe ICD-10-CM: M20.40  ICD-9-CM: 735.4  7/18/2019 - 7/18/2019               Hospital Course: The patient underwent surgery. Intra-operative complications: None; patient tolerated the procedure well. Was taken to the PACU in stable condition and then transferred to the surgical floor.       Post-op return of bowel function delayed but at the time of discharge patient was having bowel function and tolerating a diet. Perioperative Antibiotics: Cefoxitin    Postoperative Pain Management:  Norco    Postoperative transfusions:    Number of units banked PRBCs =   none     Post Op complications: None     Incisions - clean, dry and intact. No significant erythema or swelling. Wound(s) appear to be healing without any evidence of infection. Patient mobilized with nursing and was found to be safe and steady with ambulation. Discharged to: Home    Condition on Discharge: Stable     Discharge instructions:    - Take pain medications as prescribed  - Diet Regular  - Discharge activity:    - Activity as tolerated    - Ambulate several times a day   - No heavy lifting for 4 weeks   - Do not drive for two weeks or while on opioid pain medications  - Wound Care: Keep wound(s) clean and dry. See discharge instruction sheet. Allergies:  No Known Allergies           -DISCHARGE MEDICATION LIST     Current Discharge Medication List      CONTINUE these medications which have NOT CHANGED    Details   rosuvastatin (CRESTOR) 5 mg tablet Take  by mouth nightly. losartan (COZAAR) 25 mg tablet Take 25 mg by mouth daily. vit A/vit C/vit E/zinc/copper (PRESERVISION AREDS PO) Take 1 Tablet by mouth daily. multivitamin (ONE A DAY) tablet Take 1 Tab by mouth daily. omega-3 fatty acids-vitamin e (FISH OIL) 1,000 mg Cap Take 1 Cap by mouth daily. STOP taking these medications       HYDROcodone-acetaminophen (NORCO) 5-325 mg per tablet Comments:   Reason for Stopping:         HYDROcodone-acetaminophen (NORCO) 7.5-325 mg per tablet Comments:   Reason for Stopping:            per medical continuation form      - Follow up in office in 2 weeks  - Follow up as directed with GI- CT enterography planned. Signed:  Emily Sanchez.  Juan David Guthrie  MSN, APRN, FNP-C, [facility]  Surgical Nurse Practitioner    3/22/2022  8:25 AM

## 2022-03-22 NOTE — PROGRESS NOTES
In patient room for last check in before shift change patient stated he has had 3 successful trips to the toilet resulting in 3 separate bowel movements.

## 2022-03-24 PROBLEM — K56.609 SBO (SMALL BOWEL OBSTRUCTION) (HCC): Status: ACTIVE | Noted: 2022-03-16

## 2022-03-28 ENCOUNTER — TELEPHONE (OUTPATIENT)
Dept: SURGERY | Age: 82
End: 2022-03-28

## 2022-03-28 ENCOUNTER — APPOINTMENT (OUTPATIENT)
Dept: GENERAL RADIOLOGY | Age: 82
DRG: 331 | End: 2022-03-28
Attending: EMERGENCY MEDICINE
Payer: MEDICARE

## 2022-03-28 ENCOUNTER — HOSPITAL ENCOUNTER (INPATIENT)
Age: 82
LOS: 4 days | Discharge: HOME | DRG: 331 | End: 2022-04-01
Attending: EMERGENCY MEDICINE | Admitting: SURGERY
Payer: MEDICARE

## 2022-03-28 ENCOUNTER — APPOINTMENT (OUTPATIENT)
Dept: CT IMAGING | Age: 82
DRG: 331 | End: 2022-03-28
Attending: EMERGENCY MEDICINE
Payer: MEDICARE

## 2022-03-28 DIAGNOSIS — K56.609 SMALL BOWEL OBSTRUCTION (HCC): ICD-10-CM

## 2022-03-28 DIAGNOSIS — K56.609 SBO (SMALL BOWEL OBSTRUCTION) (HCC): Primary | ICD-10-CM

## 2022-03-28 DIAGNOSIS — R11.2 INTRACTABLE NAUSEA AND VOMITING: ICD-10-CM

## 2022-03-28 LAB
ALBUMIN SERPL-MCNC: 3.8 G/DL (ref 3.5–5)
ALBUMIN/GLOB SERPL: 1 {RATIO} (ref 1.1–2.2)
ALP SERPL-CCNC: 112 U/L (ref 45–117)
ALT SERPL-CCNC: 17 U/L (ref 12–78)
AMORPH CRY URNS QL MICRO: ABNORMAL
ANION GAP SERPL CALC-SCNC: 5 MMOL/L (ref 5–15)
APPEARANCE UR: ABNORMAL
AST SERPL-CCNC: 16 U/L (ref 15–37)
BACTERIA URNS QL MICRO: ABNORMAL /HPF
BILIRUB SERPL-MCNC: 0.5 MG/DL (ref 0.2–1)
BILIRUB UR QL: NEGATIVE
BUN SERPL-MCNC: 10 MG/DL (ref 6–20)
BUN/CREAT SERPL: 13 (ref 12–20)
CALCIUM SERPL-MCNC: 9.3 MG/DL (ref 8.5–10.1)
CHLORIDE SERPL-SCNC: 104 MMOL/L (ref 97–108)
CO2 SERPL-SCNC: 28 MMOL/L (ref 21–32)
COLOR UR: ABNORMAL
COVID-19 RAPID TEST, COVR: NOT DETECTED
CREAT SERPL-MCNC: 0.79 MG/DL (ref 0.7–1.3)
EPITH CASTS URNS QL MICRO: ABNORMAL /LPF
ERYTHROCYTE [DISTWIDTH] IN BLOOD BY AUTOMATED COUNT: 12.9 % (ref 11.5–14.5)
GLOBULIN SER CALC-MCNC: 4 G/DL (ref 2–4)
GLUCOSE SERPL-MCNC: 107 MG/DL (ref 65–100)
GLUCOSE UR STRIP.AUTO-MCNC: NEGATIVE MG/DL
GRAN CASTS URNS QL MICRO: ABNORMAL /LPF
HCT VFR BLD AUTO: 44.1 % (ref 36.6–50.3)
HGB BLD-MCNC: 14.7 G/DL (ref 12.1–17)
HGB UR QL STRIP: NEGATIVE
HYALINE CASTS URNS QL MICRO: >20 /LPF (ref 0–5)
KETONES UR QL STRIP.AUTO: 15 MG/DL
LEUKOCYTE ESTERASE UR QL STRIP.AUTO: ABNORMAL
LIPASE SERPL-CCNC: 112 U/L (ref 73–393)
MCH RBC QN AUTO: 29.8 PG (ref 26–34)
MCHC RBC AUTO-ENTMCNC: 33.3 G/DL (ref 30–36.5)
MCV RBC AUTO: 89.5 FL (ref 80–99)
NITRITE UR QL STRIP.AUTO: NEGATIVE
NRBC # BLD: 0 K/UL (ref 0–0.01)
NRBC BLD-RTO: 0 PER 100 WBC
PH UR STRIP: 5 [PH] (ref 5–8)
PLATELET # BLD AUTO: 262 K/UL (ref 150–400)
PMV BLD AUTO: 10.3 FL (ref 8.9–12.9)
POTASSIUM SERPL-SCNC: 4.1 MMOL/L (ref 3.5–5.1)
PROT SERPL-MCNC: 7.8 G/DL (ref 6.4–8.2)
PROT UR STRIP-MCNC: 30 MG/DL
RBC # BLD AUTO: 4.93 M/UL (ref 4.1–5.7)
RBC #/AREA URNS HPF: ABNORMAL /HPF (ref 0–5)
SODIUM SERPL-SCNC: 137 MMOL/L (ref 136–145)
SOURCE, COVRS: NORMAL
SP GR UR REFRACTOMETRY: 1.03 (ref 1–1.03)
UROBILINOGEN UR QL STRIP.AUTO: 1 EU/DL (ref 0.2–1)
WBC # BLD AUTO: 11.3 K/UL (ref 4.1–11.1)
WBC URNS QL MICRO: ABNORMAL /HPF (ref 0–4)

## 2022-03-28 PROCEDURE — 85027 COMPLETE CBC AUTOMATED: CPT

## 2022-03-28 PROCEDURE — 74011250636 HC RX REV CODE- 250/636: Performed by: EMERGENCY MEDICINE

## 2022-03-28 PROCEDURE — 99285 EMERGENCY DEPT VISIT HI MDM: CPT

## 2022-03-28 PROCEDURE — 36415 COLL VENOUS BLD VENIPUNCTURE: CPT

## 2022-03-28 PROCEDURE — 87635 SARS-COV-2 COVID-19 AMP PRB: CPT

## 2022-03-28 PROCEDURE — 74011250636 HC RX REV CODE- 250/636: Performed by: SURGERY

## 2022-03-28 PROCEDURE — 65270000029 HC RM PRIVATE

## 2022-03-28 PROCEDURE — 80053 COMPREHEN METABOLIC PANEL: CPT

## 2022-03-28 PROCEDURE — 81001 URINALYSIS AUTO W/SCOPE: CPT

## 2022-03-28 PROCEDURE — 74011000636 HC RX REV CODE- 636: Performed by: EMERGENCY MEDICINE

## 2022-03-28 PROCEDURE — 74177 CT ABD & PELVIS W/CONTRAST: CPT

## 2022-03-28 PROCEDURE — 74011000250 HC RX REV CODE- 250: Performed by: EMERGENCY MEDICINE

## 2022-03-28 PROCEDURE — 83690 ASSAY OF LIPASE: CPT

## 2022-03-28 PROCEDURE — 74018 RADEX ABDOMEN 1 VIEW: CPT

## 2022-03-28 PROCEDURE — 74011000250 HC RX REV CODE- 250: Performed by: SURGERY

## 2022-03-28 PROCEDURE — C9113 INJ PANTOPRAZOLE SODIUM, VIA: HCPCS | Performed by: EMERGENCY MEDICINE

## 2022-03-28 RX ORDER — MORPHINE SULFATE 2 MG/ML
4 INJECTION, SOLUTION INTRAMUSCULAR; INTRAVENOUS
Status: DISCONTINUED | OUTPATIENT
Start: 2022-03-28 | End: 2022-03-28

## 2022-03-28 RX ORDER — ONDANSETRON 2 MG/ML
4 INJECTION INTRAMUSCULAR; INTRAVENOUS
Status: COMPLETED | OUTPATIENT
Start: 2022-03-28 | End: 2022-03-28

## 2022-03-28 RX ORDER — NALOXONE HYDROCHLORIDE 0.4 MG/ML
0.4 INJECTION, SOLUTION INTRAMUSCULAR; INTRAVENOUS; SUBCUTANEOUS AS NEEDED
Status: DISCONTINUED | OUTPATIENT
Start: 2022-03-28 | End: 2022-04-02 | Stop reason: HOSPADM

## 2022-03-28 RX ORDER — ONDANSETRON 2 MG/ML
4 INJECTION INTRAMUSCULAR; INTRAVENOUS
Status: DISCONTINUED | OUTPATIENT
Start: 2022-03-28 | End: 2022-03-28

## 2022-03-28 RX ORDER — LORAZEPAM 2 MG/ML
2 INJECTION INTRAMUSCULAR
Status: DISCONTINUED | OUTPATIENT
Start: 2022-03-28 | End: 2022-04-02 | Stop reason: HOSPADM

## 2022-03-28 RX ORDER — DIPHENHYDRAMINE HYDROCHLORIDE 50 MG/ML
25 INJECTION, SOLUTION INTRAMUSCULAR; INTRAVENOUS
Status: DISCONTINUED | OUTPATIENT
Start: 2022-03-28 | End: 2022-04-02 | Stop reason: HOSPADM

## 2022-03-28 RX ORDER — SODIUM CHLORIDE 0.9 % (FLUSH) 0.9 %
5-40 SYRINGE (ML) INJECTION AS NEEDED
Status: DISCONTINUED | OUTPATIENT
Start: 2022-03-28 | End: 2022-04-02 | Stop reason: HOSPADM

## 2022-03-28 RX ORDER — SODIUM CHLORIDE 0.9 % (FLUSH) 0.9 %
5-40 SYRINGE (ML) INJECTION EVERY 8 HOURS
Status: DISCONTINUED | OUTPATIENT
Start: 2022-03-28 | End: 2022-04-02 | Stop reason: HOSPADM

## 2022-03-28 RX ORDER — ONDANSETRON 2 MG/ML
4 INJECTION INTRAMUSCULAR; INTRAVENOUS
Status: DISCONTINUED | OUTPATIENT
Start: 2022-03-28 | End: 2022-04-02 | Stop reason: HOSPADM

## 2022-03-28 RX ORDER — DEXTROSE, SODIUM CHLORIDE, AND POTASSIUM CHLORIDE 5; .45; .15 G/100ML; G/100ML; G/100ML
75 INJECTION INTRAVENOUS CONTINUOUS
Status: DISCONTINUED | OUTPATIENT
Start: 2022-03-28 | End: 2022-04-02 | Stop reason: HOSPADM

## 2022-03-28 RX ORDER — LORAZEPAM 2 MG/ML
1 INJECTION INTRAMUSCULAR
Status: COMPLETED | OUTPATIENT
Start: 2022-03-28 | End: 2022-03-28

## 2022-03-28 RX ORDER — ACETAMINOPHEN 325 MG/1
650 TABLET ORAL
Status: DISCONTINUED | OUTPATIENT
Start: 2022-03-28 | End: 2022-04-02 | Stop reason: HOSPADM

## 2022-03-28 RX ORDER — MORPHINE SULFATE 2 MG/ML
2 INJECTION, SOLUTION INTRAMUSCULAR; INTRAVENOUS
Status: DISCONTINUED | OUTPATIENT
Start: 2022-03-28 | End: 2022-04-02 | Stop reason: HOSPADM

## 2022-03-28 RX ADMIN — DEXTROSE MONOHYDRATE, SODIUM CHLORIDE, AND POTASSIUM CHLORIDE 125 ML/HR: 50; 4.5; 1.49 INJECTION, SOLUTION INTRAVENOUS at 16:07

## 2022-03-28 RX ADMIN — SODIUM CHLORIDE 40 MG: 9 INJECTION, SOLUTION INTRAMUSCULAR; INTRAVENOUS; SUBCUTANEOUS at 17:37

## 2022-03-28 RX ADMIN — BENZOCAINE 1 SPRAY: 200 SPRAY DENTAL; ORAL; PERIODONTAL at 14:34

## 2022-03-28 RX ADMIN — ONDANSETRON 4 MG: 2 INJECTION INTRAMUSCULAR; INTRAVENOUS at 14:35

## 2022-03-28 RX ADMIN — SODIUM CHLORIDE 500 ML: 9 INJECTION, SOLUTION INTRAVENOUS at 15:36

## 2022-03-28 RX ADMIN — SODIUM CHLORIDE, PRESERVATIVE FREE 10 ML: 5 INJECTION INTRAVENOUS at 21:38

## 2022-03-28 RX ADMIN — IOPAMIDOL 100 ML: 755 INJECTION, SOLUTION INTRAVENOUS at 12:02

## 2022-03-28 RX ADMIN — SODIUM CHLORIDE 500 ML: 9 INJECTION, SOLUTION INTRAVENOUS at 11:36

## 2022-03-28 RX ADMIN — LORAZEPAM 2 MG: 2 INJECTION INTRAMUSCULAR; INTRAVENOUS at 21:38

## 2022-03-28 RX ADMIN — LORAZEPAM 1 MG: 2 INJECTION INTRAMUSCULAR; INTRAVENOUS at 14:35

## 2022-03-28 NOTE — TELEPHONE ENCOUNTER
I received a call from [facility]. Patient was presently in the office and complained of vomiting several times this morning. She informed me he described the emesis at dark brown. I told her he needs to go to the ED and she stated, \"I told him but he refused. \" I told her I will give him a call. She gave me the patients cell number. I called him and informed him of my conversation with the NP and it sounds like you might be experiencing a GI bleed. You do not to go to the ED because that is not a good situation. He acknowledged understanding and asked if that is what we want him to do? I told him yes and please inform them you have been vomiting several times and it is dark brown. He acknowledged understanding and I did ask if he was going to the ED at [address] He told me yes. I told him I will make Dr. Mehreen Mayberry aware of the current matter.

## 2022-03-28 NOTE — ED PROVIDER NOTES
EMERGENCY DEPARTMENT HISTORY AND PHYSICAL EXAM      Date: 3/28/2022  Patient Name: Wm Estrada    History of Presenting Illness     Chief Complaint   Patient presents with    Abdominal Pain     Pt ambulatory into triage with a cc of abdominal pain that started this am; pt states he was seen here for same symptoms 2 weeks ago and had hernia repair; pt states that he has not felt well since the surgery       History Provided By: Patient    HPI: Wm Estrada, [de-identified] y.o. male with PMHx as noted below presents the emergency department chief complaint abdominal pain, nausea vomiting. Patient reports that sudden onset of severe diffuse abdominal pain this morning. Pain is been intermittent and describes it as sharp, stabbing pain. The pain does not radiate. Patient also reports several episodes of nausea and vomiting with some blood-streaked emesis noted. Patient reports recent admission for the same where he was diagnosed with a small bowel obstruction and underwent surgery. Patient has been doing well at home until today when symptoms return. Pt denies any other alleviating or exacerbating factors. Additionally, pt specifically denies any recent fever, chills, headache,  pain, CP, SOB, lightheadedness, dizziness, numbness, weakness, BLE swelling, heart palpitations, urinary sxs, diarrhea, constipation, melena, hematochezia, cough, or congestion.   PCP: Chioma Zepeda MD    Current Facility-Administered Medications   Medication Dose Route Frequency Provider Last Rate Last Admin    dextrose 5% - 0.45% NaCl with KCl 20 mEq/L infusion  125 mL/hr IntraVENous CONTINUOUS Marco Muñiz  mL/hr at 03/28/22 1607 125 mL/hr at 03/28/22 1607    sodium chloride (NS) flush 5-40 mL  5-40 mL IntraVENous Q8H Marco Muñiz MD        sodium chloride (NS) flush 5-40 mL  5-40 mL IntraVENous PRN Marco Muñiz MD        acetaminophen (TYLENOL) tablet 650 mg  650 mg Oral Q6H PRN MD Fuad Caballero naloxone [facility]) injection 0.4 mg  0.4 mg IntraVENous PRN Larry Hendrix MD        ondansetron [facility]) injection 4 mg  4 mg IntraVENous Q4H PRN Larry Hendrix MD        diphenhydrAMINE (BENADRYL) injection 25 mg  25 mg IntraVENous Q6H PRN Larry Hendrix MD        morphine injection 2 mg  2 mg IntraVENous Q2H PRN Larry Hendrix MD        LORazepam (ATIVAN) injection 2 mg  2 mg IntraVENous Q6H PRN Larry Hendrix MD        benzocaine (HURRICANE) 20 % spray   Mucous Membrane PRN Larry Hendrix MD        benzocaine-menthoL (CHLORASEPTIC MAX) lozenge 1 Lozenge  1 Lozenge Oral Q2H PRN Larry Hendrix MD        sodium chloride 0.9 % bolus infusion 500 mL  500 mL IntraVENous NOW Mirella Perez MD        pantoprazole (PROTONIX) 40 mg in 0.9% sodium chloride 10 mL injection  40 mg IntraVENous NOW Mirella Perez MD         Current Outpatient Medications   Medication Sig Dispense Refill    rosuvastatin (CRESTOR) 5 mg tablet Take  by mouth nightly.  losartan (COZAAR) 25 mg tablet Take 25 mg by mouth daily.  vit A/vit C/vit E/zinc/copper (PRESERVISION AREDS PO) Take 1 Tablet by mouth daily.  multivitamin (ONE A DAY) tablet Take 1 Tab by mouth daily.  omega-3 fatty acids-vitamin e (FISH OIL) 1,000 mg Cap Take 1 Cap by mouth daily.            Past History     Past Medical History:  Past Medical History:   Diagnosis Date    CAD (coronary artery disease)     3 stents    COVID     02/22    Diverticulosis     Hammer toe 7/18/2019    Hammertoe of right foot     Hypertension     Macular degeneration     [facility]    Metatarsalgia, right foot 7/18/2019    Sinus bradycardia     Small bowel obstruction ([address]     02/22       Past Surgical History:  Past Surgical History:   Procedure Laterality Date    HX GI  age 29    colon procedure    HX HEART CATHETERIZATION  prior to 2005    x3 stents    HX ORTHOPAEDIC Right 2015    second hammertoe repair    HX ORTHOPAEDIC Right 2022    foot surgery     HX OTHER SURGICAL      colonoscopy--polyp    HX TONSILLECTOMY         Family History:  Family History   Problem Relation Age of Onset    Lung Disease Mother     Lung Disease Father     Abdominal aortic aneurysm Father     ALS Brother        Social History:  Social History     Tobacco Use    Smoking status: Former Smoker     Packs/day: 1.00     Years: 30.00     Pack years: 30.00     Quit date: 3/3/2000     Years since quittin.0    Smokeless tobacco: Never Used   Vaping Use    Vaping Use: Never used   Substance Use Topics    Alcohol use: Not Currently     Comment: none in 15 plus years    Drug use: Never       Allergies:  No Known Allergies      Review of Systems   Review of Systems  Constitutional: Negative for fever, chills, and fatigue. HENT: Negative for congestion, sore throat, rhinorrhea, sneezing and neck stiffness   Eyes: Negative for discharge and redness. Respiratory: Negative for  shortness of breath, wheezing   Cardiovascular: Negative for chest pain, palpitations   Gastrointestinal: Positive for nausea, vomiting, abdominal pain. Negative constipation, diarrhea and blood in stool. Genitourinary: Negative for dysuria, urgency, frequency, hematuria, flank pain, decreased urine volume, discharge,   Musculoskeletal: Negative for myalgias or joint pain . Skin: Negative for rash or lesions . Neurological: Negative weakness, light-headedness, numbness and headaches. Physical Exam   Physical Exam    GENERAL: alert and oriented, no acute distress  EYES: PEERL, No injection, discharge or icterus. ENT: Mucous membranes pink and moist.  NECK: Supple  LUNGS: Airway patent. Non-labored respirations. Breath sounds clear with good air entry bilaterally. HEART: Regular rate and rhythm. No peripheral edema  ABDOMEN: Non-distended, diffusely tender, without guarding or rebound.   SKIN:  warm, dry  EXTREMITIES: Without swelling, tenderness or deformity, symmetric with normal ROM  NEUROLOGICAL: Alert, oriented      Diagnostic Study Results     Labs -     Recent Results (from the past 12 hour(s))   URINALYSIS W/ RFLX MICROSCOPIC    Collection Time: 03/28/22 10:40 AM   Result Value Ref Range    Color DARK YELLOW      Appearance CLOUDY (A) CLEAR      Specific gravity 1.030 1.003 - 1.030      pH (UA) 5.0 5.0 - 8.0      Protein 30 (A) NEG mg/dL    Glucose Negative NEG mg/dL    Ketone 15 (A) NEG mg/dL    Bilirubin Negative NEG      Blood Negative NEG      Urobilinogen 1.0 0.2 - 1.0 EU/dL    Nitrites Negative NEG      Leukocyte Esterase SMALL (A) NEG      WBC 10-20 0 - 4 /hpf    RBC 0-5 0 - 5 /hpf    Epithelial cells FEW FEW /lpf    Bacteria 1+ (A) NEG /hpf    Amorphous Crystals FEW (A) NEG      Hyaline cast >20 (H) 0 - 5 /lpf    Granular cast 2-5 (A) NEG /lpf   CBC W/O DIFF    Collection Time: 03/28/22 11:23 AM   Result Value Ref Range    WBC 11.3 (H) 4.1 - 11.1 K/uL    RBC 4.93 4.10 - 5.70 M/uL    HGB 14.7 12.1 - 17.0 g/dL    HCT 44.1 36.6 - 50.3 %    MCV 89.5 80.0 - 99.0 FL    MCH 29.8 26.0 - 34.0 PG    MCHC 33.3 30.0 - 36.5 g/dL    RDW 12.9 11.5 - 14.5 %    PLATELET 999 903 - 380 K/uL    MPV 10.3 8.9 - 12.9 FL    NRBC 0.0 0  WBC    ABSOLUTE NRBC 0.00 0.00 - 8.98 K/uL   METABOLIC PANEL, COMPREHENSIVE    Collection Time: 03/28/22 11:23 AM   Result Value Ref Range    Sodium 137 136 - 145 mmol/L    Potassium 4.1 3.5 - 5.1 mmol/L    Chloride 104 97 - 108 mmol/L    CO2 28 21 - 32 mmol/L    Anion gap 5 5 - 15 mmol/L    Glucose 107 (H) 65 - 100 mg/dL    BUN 10 6 - 20 MG/DL    Creatinine 0.79 0.70 - 1.30 MG/DL    BUN/Creatinine ratio 13 12 - 20      GFR est AA >60 >60 ml/min/1.73m2    GFR est non-AA >60 >60 ml/min/1.73m2    Calcium 9.3 8.5 - 10.1 MG/DL    Bilirubin, total 0.5 0.2 - 1.0 MG/DL    ALT (SGPT) 17 12 - 78 U/L    AST (SGOT) 16 15 - 37 U/L    Alk.  phosphatase 112 45 - 117 U/L    Protein, total 7.8 6.4 - 8.2 g/dL    Albumin 3.8 3.5 - 5.0 g/dL Globulin 4.0 2.0 - 4.0 g/dL    A-G Ratio 1.0 (L) 1.1 - 2.2     LIPASE    Collection Time: 03/28/22 11:23 AM   Result Value Ref Range    Lipase 112 73 - 393 U/L   COVID-19 RAPID TEST    Collection Time: 03/28/22  2:52 PM   Result Value Ref Range    Specimen source Nasopharyngeal      COVID-19 rapid test Not detected NOTD         Radiologic Studies -   XR ABD (KUB)   Final Result   NG tube within the gastric lumen                  CT ABD PELV W CONT   Final Result      1. Persistent, relatively stable moderately high-grade mechanical obstruction of   small bowel with a transition thought to lie in the proximal to mid ileum in the   right upper quadrant. 2. Other stable incidental findings, including diverticulosis, small abdominal   aortic aneurysm, small inguinal hernias, cholelithiasis and renal cysts. 3. Right basilar atelectasis. CT Results  (Last 48 hours)               03/28/22 1202  CT ABD PELV W CONT Final result    Impression:      1. Persistent, relatively stable moderately high-grade mechanical obstruction of   small bowel with a transition thought to lie in the proximal to mid ileum in the   right upper quadrant. 2. Other stable incidental findings, including diverticulosis, small abdominal   aortic aneurysm, small inguinal hernias, cholelithiasis and renal cysts. 3. Right basilar atelectasis. Narrative:  EXAM: CT ABD PELV W CONT       INDICATION: abd pain, recent reduction of internal hernia       COMPARISON: 3/16/2022        CONTRAST: 100 mL of Isovue-370. ORAL CONTRAST: None       TECHNIQUE:    Following the uneventful intravenous administration of contrast, thin axial   images were obtained through the abdomen and pelvis. Coronal and sagittal   reconstructions were generated. CT dose reduction was achieved through use of a   standardized protocol tailored for this examination and automatic exposure   control for dose modulation.        FINDINGS:    LOWER THORAX: Right basilar atelectasis. LIVER: No mass. BILIARY TREE: Cholelithiasis without gallbladder distention. CBD is not dilated. SPLEEN: within normal limits. PANCREAS: No mass or ductal dilatation. ADRENALS: Unremarkable. KIDNEYS: No solid cyst mass, calculus, or hydronephrosis. Numerous stable   hypoechoic well-circumscribed masses most suggestive of cysts. STOMACH: The stomach is markedly distended with fluid, with mild distention of   the distal esophagus as well. SMALL BOWEL: Diffuse persistent dilatation of small bowel, with a transition   thought to lie in the right upper quadrant, in approximately the proximal ileum. Maximum caliber of dilated bowel is roughly stable, 3.9 cm. COLON: No dilatation or wall thickening. Numerous diverticula without associated   acute inflammatory change. APPENDIX: Normal   PERITONEUM: Small free fluid in the pelvis. RETROPERITONEUM: Abdominal aorta 3.4 cm, mildly enlarged but stable. No   lymphadenopathy. REPRODUCTIVE ORGANS: The prostate gland is mildly enlarged. URINARY BLADDER: Minimally distended, small amount of intraluminal gas,   correlate for recent catheterization. BONES: No destructive bone lesion. ABDOMINAL WALL: 1 small bilateral inguinal hernias containing fat only. ADDITIONAL COMMENTS: N/A               CXR Results  (Last 48 hours)    None            Medical Decision Making     IElba MD am the first provider for this patient and am the attending of record for this patient encounter. I reviewed the vital signs, available nursing notes, past medical history, past surgical history, family history and social history. Vital Signs-Reviewed the patient's vital signs.   Patient Vitals for the past 12 hrs:   Temp Pulse Resp BP SpO2   03/28/22 1545 -- 81 25 (!) 153/62 97 %   03/28/22 1503 -- 72 18 (!) 140/71 98 %   03/28/22 1433 -- 67 19 132/84 96 %   03/28/22 1218 -- 71 18 126/74 98 %   03/28/22 1133 -- 71 19 135/81 96 %   03/28/22 1103 -- 67 11 131/81 97 %   03/28/22 1048 -- 69 16 (!) 122/94 96 %   03/28/22 1033 98.1 °F (36.7 °C) 80 16 122/66 96 %           Records Reviewed: Nursing Notes and Old Medical Records    Provider Notes (Medical Decision Making): The patient presents with a chief complaint of abdominal pain. Differential diagnosis is broad and includes acute appendicitis, acute cholecystitis, pancreatitis, gastritis, PUD, diverticulitis, mesenteric ischemia, abdominal aortic aneurysm, aortic dissection, bowel obstruction, enteritis, colitis, fecal impaction, volvulus, IBS, inflammatory bowel disease, specific food intolerance, peritonitis, perforated viscous, malignancy, UTI, abscess, testicular torsion, epididymitis, orchitis, testicular torsion and abdominal pain NOS. Shakir Awkward All labs and diagnostic studies were reviewed and interpreted by me. Labs showed no significant derangements. CT scan again showed high-grade small bowel obstruction with transition point. Patient was given IV fluids, antiemetics with some improvement. NG tube was placed with significant output noted. Consulted with general surgery who will admit for further management. ED Course:   Initial assessment performed. The patients presenting problems have been discussed, and they are in agreement with the care plan formulated and outlined with them. I have encouraged them to ask questions as they arise throughout their visit.         Medications   dextrose 5% - 0.45% NaCl with KCl 20 mEq/L infusion (125 mL/hr IntraVENous New Bag 3/28/22 1607)   sodium chloride (NS) flush 5-40 mL (has no administration in time range)   sodium chloride (NS) flush 5-40 mL (has no administration in time range)   acetaminophen (TYLENOL) tablet 650 mg (has no administration in time range)   naloxone (NARCAN) injection 0.4 mg (has no administration in time range)   ondansetron (ZOFRAN) injection 4 mg (has no administration in time range)   diphenhydrAMINE (BENADRYL) injection 25 mg (has no administration in time range)   morphine injection 2 mg (has no administration in time range)   LORazepam (ATIVAN) injection 2 mg (has no administration in time range)   benzocaine (HURRICANE) 20 % spray (has no administration in time range)   benzocaine-menthoL (CHLORASEPTIC MAX) lozenge 1 Lozenge (has no administration in time range)   sodium chloride 0.9 % bolus infusion 500 mL (has no administration in time range)   pantoprazole (PROTONIX) 40 mg in 0.9% sodium chloride 10 mL injection (has no administration in time range)   sodium chloride 0.9 % bolus infusion 500 mL (500 mL IntraVENous New Bag 3/28/22 1136)   iopamidoL (ISOVUE-370) 76 % injection 100 mL (100 mL IntraVENous Given 3/28/22 1202)   LORazepam (ATIVAN) injection 1 mg (1 mg IntraVENous Given 3/28/22 1435)   benzocaine (HURRICANE) 20 % spray 1 Spray (1 Spray Mucous Membrane Given 3/28/22 1434)   ondansetron (ZOFRAN) injection 4 mg (4 mg IntraVENous Given 3/28/22 1435)         PROGRESS:  The patient has been re-evaluated and sx have improved. Reviewed available results with patient and have counseled them on diagnosis and care plan. They have expressed understanding, and all their questions have been answered. They agree with plan for admission. CONSULT:  Gwenevere Bumpers, MD spoke with  Dr. Katherine Carrasquillo. Discussed HPI and PE, available diagnostic tests and clinical findings. He is in agreement with care plans as outlined and will evaluate for admission     Admit Note  Patient is being admitted to general surgery. The results of their tests and reasons for their admission have been discussed with them and/or available family. They convey agreement and understanding for the need to be admitted and for their admission diagnosis. Consultation has been made with the inpatient physician specialist for hospitalization. Disposition:  admission    PLAN:  1. Admit     Diagnosis     Clinical Impression:   1. SBO (small bowel obstruction) ([address]    2. Intractable nausea and vomiting        Please note that this dictation was completed with Dragon, computer voice recognition software. Quite often unanticipated grammatical, syntax, homophones, and other interpretive errors are inadvertently transcribed by the computer software. Please disregard these errors. Additionally, please excuse any errors that have escaped final proofreading.

## 2022-03-28 NOTE — PROGRESS NOTES
Transition of Care Plan:    RUR: 14 %, LOW, Pt is a READMIT  Disposition: Home with Significant Other   Follow up appointments: PCP, Surgery  DME needed: None  Transportation at Discharge: Significant Other Carissa Elam [phone]  Keys or means to access home: Yes       IM Medicare Letter: 2nd IM letter before discharge  Is patient a BCPI-A Bundle:    Bundle letter will be distributed by unit CM if pt meets bundle criteria. If yes, was Bundle Letter given?:    Is patient a  and connected with the [place]? N/A       If yes, was [employer] transfer form completed and VA notified? Caregiver Contact: Significant Other [phone]  Discharge Caregiver contacted prior to discharge? Care Conference needed?:                 Unit CM to follow up before discharge      Reason for Readmission:    Small Bowel Obstruction           RUR Score/Risk Level:     14 %    PCP: First and Last name:  Dr. Martha Kee   Name of Practice: [address]   Are you a current patient: Yes/No: Yes   Approximate date of last visit: 3/28/22   Can you participate in a virtual visit with your PCP: Yes    Is a Care Conference indicated: IDRs      Did you attend your follow up appointment (s): If not, why not:  Yes patient attended follow up appointment today 3/28/22       Resources/supports as identified by patient/family:     Pt has a supportive significant other as well as a  son that lives locally. Top Challenges facing patient (as identified by patient/family and CM): Finances/Medication cost?  No issues reported; Pt has [employer]; Preferred Pharmacy is [facility]    Pt drives at baseline. Pt's significant other to transport at discharge   Support system or lack thereof? Pt has a supportive significant other as well as a  son that lives locally. Living arrangements? Patient lives in one story          Self-care/ADLs/Cognition?   Pt is independent of his ADLS/IDLS including driving. Current Advanced Directive/Advance Care Plan:    Advance Care Planning     General Advance Care Planning (ACP) Conversation    Date of Conversation: 3/28/2022  Conducted with: Patient with Decision Making Capacity    Healthcare Decision Maker:   No healthcare decision makers have been documented. Click here to complete [address] including selection of the Healthcare Decision Maker Relationship (ie \"Primary\")    Today we documented Decision Maker(s) consistent with Legal Next of Kin hierarchy. Content/Action Overview: Has ACP document(s) NOT on file - requested patient to provide  Reviewed DNR/DNI and patient elects Full Code (Attempt Resuscitation)     Length of Voluntary ACP Conversation in minutes:  <16 minutes (Non-Billable)    Shanika Sands RN                Plan for utilizing home health:  Not at this time              Transition of Care Plan:    Based on readmission, the patient's previous Plan of Care   has been evaluated and/or modified. The current Transition of Care Plan is:       Home with Significant Other     CM met with patient at the bedside to discuss discharge planning. Patient name, , and demographics all verified in chart. Patient was recently admitted from 3/16-3/22 for Small Bowel Obstruction. Patient was discharged home with family. Patient was seen by his PCP today and referred to ED for further evaluation. Patient lives in a one level home with his significant other. Patient is independent of his ADLS/IDLS including driving. Patient has a RW and cane at home that he reports he does not use. Patient has no SNF, HH, or IPR history. Patient reports he has an AMD at home that lists his  Isaiah Sam as his medical power of . CM requested patient to provide documents to scan into patient's medical chart. Patient preferred pharmacy is [facility]. Patient is active with his PCP.     Readmission Assessment  Number of days since last admission?: 1-7 days  Previous disposition: Home with Family  Who is being interviewed?: Patient  What was the patient's/caregiver's perception as to why they think they needed to return back to the hospital?: Other (Comment) (Patient having increased vomiting, PCP referred to ED for further evaluation)  Did you visit your Primary Care Physician after you left the hospital, before you returned this time?: Yes  Did you see a specialist, such as Cardiac, Pulmonary, Orthopedic Physician, etc. after you left the hospital?: Yes  Who advised the patient to return to the hospital?: Physician's Nurse/Office Staff  Does the patient report anything that got in the way of taking their medications?: No  In our efforts to provide the best possible care to you and others like you, can you think of anything that we could have done to help you after you left the hospital the first time, so that you might not have needed to return so soon?: Other (Comment) (Patient having increased vomiting, PCP referred to ED for further evaluation)    Care Management Interventions  PCP Verified by CM: Yes  Mode of Transport at Discharge: Other (see comment) (Patient's significant other to transport )  Transition of Care Consult (CM Consult): Discharge Planning  Discharge Durable Medical Equipment: No  Physical Therapy Consult: No  Occupational Therapy Consult: No  Speech Therapy Consult: No  Support Systems: Spouse/Significant Other,Child(chapito)  Confirm Follow Up Transport: Self  Discharge Location  Patient Expects to be Discharged to[de-identified] Home    Unit CM to continue to follow for discharge needs and planning.     Tan Jiang, BSN, RN, BSW   RN Care Manager

## 2022-03-28 NOTE — ED NOTES
Pt presents to the ED with N/V up blood that pt states is coffee color. Pt states he has history of adhesions and hernias. Pt states abdomen tender to touch. Pt states he feels nauseous. Gave an emesis bag and bed in low position, locked, and call bell in reach.     11:43 Pt transported to CT    18:47 Attempted to call report no answer

## 2022-03-28 NOTE — H&P
Surgery Consult    Subjective:      Rosa Johnson is a [de-identified] y.o. male who is being seen for evaluation of abdominal pain. The pain is located in the diffusely. Pain is described as dull and aching and measures 5/10 in intensity. Onset of pain was several hours ago. Aggravating factors include activity, pressure and eating. Alleviating factors include none. Associated symptoms include nausea and vomiting. Patient is 10 days out from a laparoscopic lysis of adhesions for a SBO. He took several days to have return of bowel function post operative. He states he was feeling fine at discharge. He was passing flatus, tolerating a diet and having BMs. He was feel well yesterday. This morning he awoke with pain and projectile vomiting.       Patient Active Problem List    Diagnosis Date Noted    SBO (small bowel obstruction) ([address] 2022    Small bowel obstruction ([address] 2022    COVID-19 2022     Past Medical History:   Diagnosis Date    CAD (coronary artery disease)     3 stents    COVID         Diverticulosis     Hammer toe 2019    Hammertoe of right foot     Hypertension     Macular degeneration     right [facility]    Metatarsalgia, right foot 2019    Sinus bradycardia     Small bowel obstruction ([address]           Past Surgical History:   Procedure Laterality Date    HX GI  age 29    colon procedure    HX HEART CATHETERIZATION  prior to 2005    x3 stents    HX ORTHOPAEDIC Right 2015    second hammertoe repair    HX ORTHOPAEDIC Right 2022    foot surgery     HX OTHER SURGICAL      colonoscopy--polyp    HX TONSILLECTOMY        Social History     Tobacco Use    Smoking status: Former Smoker     Packs/day: 1.00     Years: 30.00     Pack years: 30.00     Quit date: 3/3/2000     Years since quittin.0    Smokeless tobacco: Never Used   Substance Use Topics    Alcohol use: Not Currently     Comment: none in 15 plus years      Family History   Problem Relation Age of Onset    Lung Disease Mother     Lung Disease Father     Abdominal aortic aneurysm Father     ALS Brother       Current Facility-Administered Medications   Medication Dose Route Frequency    dextrose 5% - 0.45% NaCl with KCl 20 mEq/L infusion  125 mL/hr IntraVENous CONTINUOUS    sodium chloride (NS) flush 5-40 mL  5-40 mL IntraVENous Q8H    sodium chloride (NS) flush 5-40 mL  5-40 mL IntraVENous PRN    acetaminophen (TYLENOL) tablet 650 mg  650 mg Oral Q6H PRN    naloxone (NARCAN) injection 0.4 mg  0.4 mg IntraVENous PRN    ondansetron (ZOFRAN) injection 4 mg  4 mg IntraVENous Q4H PRN    diphenhydrAMINE (BENADRYL) injection 25 mg  25 mg IntraVENous Q6H PRN    morphine injection 2 mg  2 mg IntraVENous Q2H PRN    LORazepam (ATIVAN) injection 2 mg  2 mg IntraVENous Q6H PRN    benzocaine (HURRICANE) 20 % spray   Mucous Membrane PRN    benzocaine-menthoL (CHLORASEPTIC MAX) lozenge 1 Lozenge  1 Lozenge Oral Q2H PRN    LORazepam (ATIVAN) injection 1 mg  1 mg IntraVENous NOW    benzocaine (HURRICANE) 20 % spray 1 Spray  1 Spray Mucous Membrane NOW    ondansetron (ZOFRAN) injection 4 mg  4 mg IntraVENous NOW    sodium chloride 0.9 % bolus infusion 500 mL  500 mL IntraVENous NOW     Current Outpatient Medications   Medication Sig    rosuvastatin (CRESTOR) 5 mg tablet Take  by mouth nightly.  losartan (COZAAR) 25 mg tablet Take 25 mg by mouth daily.  vit A/vit C/vit E/zinc/copper (PRESERVISION AREDS PO) Take 1 Tablet by mouth daily.  multivitamin (ONE A DAY) tablet Take 1 Tab by mouth daily.  omega-3 fatty acids-vitamin e (FISH OIL) 1,000 mg Cap Take 1 Cap by mouth daily. No Known Allergies    Review of Systems:    A comprehensive review of systems was negative except for that written in the History of Present Illness.     Objective:        Visit Vitals  /81   Pulse 67   Temp 98.1 °F (36.7 °C)   Resp 11   Ht 5' 6\" (1.676 m)   Wt 81.7 kg (180 lb 1.9 oz)   SpO2 97%   BMI 29.07 kg/m²       Physical Exam:  GENERAL: alert, cooperative, no distress, appears stated age, EYE: negative, LUNG: clear to auscultation bilaterally, HEART: regular rate and rhythm, S1, S2 normal, no murmur, click, rub or gallop, ABDOMEN: distended, tympanic, mildly tender, hypoactive bowel sounds,  healing laparoscopic scars     Imaging:  images and reports reviewed    Lab/Data Review:  BMP:   Lab Results   Component Value Date/Time     03/28/2022 11:23 AM    K 4.1 03/28/2022 11:23 AM     03/28/2022 11:23 AM    CO2 28 03/28/2022 11:23 AM    AGAP 5 03/28/2022 11:23 AM     (H) 03/28/2022 11:23 AM    BUN 10 03/28/2022 11:23 AM    CREA 0.79 03/28/2022 11:23 AM    GFRAA >60 03/28/2022 11:23 AM    GFRNA >60 03/28/2022 11:23 AM     CBC:   Lab Results   Component Value Date/Time    WBC 11.3 (H) 03/28/2022 11:23 AM    HGB 14.7 03/28/2022 11:23 AM    HCT 44.1 03/28/2022 11:23 AM     03/28/2022 11:23 AM         CT -  IMPRESSION     1. Persistent, relatively stable moderately high-grade mechanical obstruction of  small bowel with a transition thought to lie in the proximal to mid ileum in the  right upper quadrant. 2. Other stable incidental findings, including diverticulosis, small abdominal  aortic aneurysm, small inguinal hernias, cholelithiasis and renal cysts. 3. Right basilar atelectasis. Assessment:     Patient with persistent/recurrent SBO after recent laparoscopic lysis of adhesions. Recommend re-exploration. Patient is stable. Recommend decompression overnight. On OR schedule for 7:30am tomorrow for laparoscopic exploration, possible bowel resection, with low threshold for conversion to open. Plan:     1. I recommend proceeding with Surgery:  Laparoscopy.      2. Discussed aspects of surgical intervention, methods, risks (including by not limited to infection, bleeding, hematoma, and perforation of the intestines or solid organs), and the risks of general anesthetic. The patient understands the risks; any and all questions were answered to the patient's satisfaction. 3. Patient does wish to proceed with surgery.

## 2022-03-28 NOTE — ED NOTES
TRANSFER - OUT REPORT:    Verbal report given to Renee(name) on Alphonse Neither  being transferred to Surg Tele(unit) for routine progression of care       Report consisted of patients Situation, Background, Assessment and   Recommendations(SBAR). Information from the following report(s) SBAR, Kardex, ED Summary, [facility] and Recent Results was reviewed with the receiving nurse. Lines:   Peripheral IV 03/28/22 Left Antecubital (Active)   Site Assessment Clean, dry, & intact 03/28/22 1122   Phlebitis Assessment 0 03/28/22 1122   Infiltration Assessment 0 03/28/22 1122   Dressing Status Clean, dry, & intact 03/28/22 1122   Hub Color/Line Status Green 03/28/22 1122        Opportunity for questions and clarification was provided.

## 2022-03-29 ENCOUNTER — APPOINTMENT (OUTPATIENT)
Dept: GENERAL RADIOLOGY | Age: 82
DRG: 331 | End: 2022-03-29
Attending: SURGERY
Payer: MEDICARE

## 2022-03-29 ENCOUNTER — ANESTHESIA (OUTPATIENT)
Dept: SURGERY | Age: 82
DRG: 331 | End: 2022-03-29
Payer: MEDICARE

## 2022-03-29 ENCOUNTER — ANESTHESIA EVENT (OUTPATIENT)
Dept: SURGERY | Age: 82
DRG: 331 | End: 2022-03-29
Payer: MEDICARE

## 2022-03-29 LAB
ANION GAP SERPL CALC-SCNC: 2 MMOL/L (ref 5–15)
BUN SERPL-MCNC: 10 MG/DL (ref 6–20)
BUN/CREAT SERPL: 14 (ref 12–20)
CALCIUM SERPL-MCNC: 8.2 MG/DL (ref 8.5–10.1)
CHLORIDE SERPL-SCNC: 111 MMOL/L (ref 97–108)
CO2 SERPL-SCNC: 29 MMOL/L (ref 21–32)
CREAT SERPL-MCNC: 0.71 MG/DL (ref 0.7–1.3)
ERYTHROCYTE [DISTWIDTH] IN BLOOD BY AUTOMATED COUNT: 13.2 % (ref 11.5–14.5)
GLUCOSE SERPL-MCNC: 109 MG/DL (ref 65–100)
HCT VFR BLD AUTO: 37.5 % (ref 36.6–50.3)
HGB BLD-MCNC: 12.6 G/DL (ref 12.1–17)
MCH RBC QN AUTO: 29.8 PG (ref 26–34)
MCHC RBC AUTO-ENTMCNC: 33.6 G/DL (ref 30–36.5)
MCV RBC AUTO: 88.7 FL (ref 80–99)
NRBC # BLD: 0 K/UL (ref 0–0.01)
NRBC BLD-RTO: 0 PER 100 WBC
PLATELET # BLD AUTO: 214 K/UL (ref 150–400)
PMV BLD AUTO: 10 FL (ref 8.9–12.9)
POTASSIUM SERPL-SCNC: 3.9 MMOL/L (ref 3.5–5.1)
RBC # BLD AUTO: 4.23 M/UL (ref 4.1–5.7)
SODIUM SERPL-SCNC: 142 MMOL/L (ref 136–145)
WBC # BLD AUTO: 8.1 K/UL (ref 4.1–11.1)

## 2022-03-29 PROCEDURE — 77030040179 HC DEV DRSG WND PICO S&N -C: Performed by: SURGERY

## 2022-03-29 PROCEDURE — 88307 TISSUE EXAM BY PATHOLOGIST: CPT

## 2022-03-29 PROCEDURE — 77030011278 HC ELECTRD LIG IMPT COVD -F: Performed by: SURGERY

## 2022-03-29 PROCEDURE — P9045 ALBUMIN (HUMAN), 5%, 250 ML: HCPCS | Performed by: REGISTERED NURSE

## 2022-03-29 PROCEDURE — 74011250636 HC RX REV CODE- 250/636: Performed by: SURGERY

## 2022-03-29 PROCEDURE — 74011250636 HC RX REV CODE- 250/636: Performed by: REGISTERED NURSE

## 2022-03-29 PROCEDURE — 77030005513 HC CATH URETH FOL11 MDII -B: Performed by: SURGERY

## 2022-03-29 PROCEDURE — 74011250636 HC RX REV CODE- 250/636: Performed by: ANESTHESIOLOGY

## 2022-03-29 PROCEDURE — 74011000250 HC RX REV CODE- 250: Performed by: SURGERY

## 2022-03-29 PROCEDURE — 76010000171 HC OR TIME 2 TO 2.5 HR INTENSV-TIER 1: Performed by: SURGERY

## 2022-03-29 PROCEDURE — 77030016154: Performed by: SURGERY

## 2022-03-29 PROCEDURE — 77030016151 HC PROTCTR LNS DFOG COVD -B: Performed by: SURGERY

## 2022-03-29 PROCEDURE — 85027 COMPLETE CBC AUTOMATED: CPT

## 2022-03-29 PROCEDURE — 77030033639 HC SHR ENDO COAG HARM 36 J&J -E: Performed by: SURGERY

## 2022-03-29 PROCEDURE — 74011250637 HC RX REV CODE- 250/637: Performed by: SURGERY

## 2022-03-29 PROCEDURE — 2709999900 HC NON-CHARGEABLE SUPPLY: Performed by: SURGERY

## 2022-03-29 PROCEDURE — 77030009403 HC ELECTRD ENDO MEGA -B: Performed by: SURGERY

## 2022-03-29 PROCEDURE — 65270000029 HC RM PRIVATE

## 2022-03-29 PROCEDURE — 77030031139 HC SUT VCRL2 J&J -A: Performed by: SURGERY

## 2022-03-29 PROCEDURE — 77030020061 HC IV BLD WRMR ADMIN SET 3M -B: Performed by: ANESTHESIOLOGY

## 2022-03-29 PROCEDURE — 44120 REMOVAL OF SMALL INTESTINE: CPT | Performed by: SURGERY

## 2022-03-29 PROCEDURE — 77030009968 HC RELD STPLR ENDOSC J&J -D: Performed by: SURGERY

## 2022-03-29 PROCEDURE — 77030008684 HC TU ET CUF COVD -B: Performed by: ANESTHESIOLOGY

## 2022-03-29 PROCEDURE — 36415 COLL VENOUS BLD VENIPUNCTURE: CPT

## 2022-03-29 PROCEDURE — 77030008756 HC TU IRR SUC STRY -B: Performed by: SURGERY

## 2022-03-29 PROCEDURE — 77030008462 HC STPLR SKN PROX J&J -A: Performed by: SURGERY

## 2022-03-29 PROCEDURE — 74011000250 HC RX REV CODE- 250: Performed by: REGISTERED NURSE

## 2022-03-29 PROCEDURE — 77030027876 HC STPLR ENDOSC FLX PWR J&J -G1: Performed by: SURGERY

## 2022-03-29 PROCEDURE — 77030002996 HC SUT SLK J&J -A: Performed by: SURGERY

## 2022-03-29 PROCEDURE — 77030008606 HC TRCR ENDOSC KII AMR -B: Performed by: SURGERY

## 2022-03-29 PROCEDURE — 87086 URINE CULTURE/COLONY COUNT: CPT

## 2022-03-29 PROCEDURE — 0DJD4ZZ INSPECTION OF LOWER INTESTINAL TRACT, PERCUTANEOUS ENDOSCOPIC APPROACH: ICD-10-PCS | Performed by: SURGERY

## 2022-03-29 PROCEDURE — 77030013079 HC BLNKT BAIR HGGR 3M -A: Performed by: ANESTHESIOLOGY

## 2022-03-29 PROCEDURE — 0DB80ZZ EXCISION OF SMALL INTESTINE, OPEN APPROACH: ICD-10-PCS | Performed by: SURGERY

## 2022-03-29 PROCEDURE — 76060000035 HC ANESTHESIA 2 TO 2.5 HR: Performed by: SURGERY

## 2022-03-29 PROCEDURE — 76210000016 HC OR PH I REC 1 TO 1.5 HR: Performed by: SURGERY

## 2022-03-29 PROCEDURE — 77030018684: Performed by: SURGERY

## 2022-03-29 PROCEDURE — 77030026438 HC STYL ET INTUB CARD -A: Performed by: ANESTHESIOLOGY

## 2022-03-29 PROCEDURE — 77030035236 HC SUT PDS STRATFX BARB J&J -B: Performed by: SURGERY

## 2022-03-29 PROCEDURE — 77030019908 HC STETH ESOPH SIMS -A: Performed by: ANESTHESIOLOGY

## 2022-03-29 PROCEDURE — 77030020747 HC TU INSUF ENDOSC TELE -A: Performed by: SURGERY

## 2022-03-29 PROCEDURE — 77030002933 HC SUT MCRYL J&J -A: Performed by: SURGERY

## 2022-03-29 PROCEDURE — 80048 BASIC METABOLIC PNL TOTAL CA: CPT

## 2022-03-29 PROCEDURE — 77030021678 HC GLIDESCP STAT DISP VERT -B: Performed by: ANESTHESIOLOGY

## 2022-03-29 RX ORDER — PROPOFOL 10 MG/ML
INJECTION, EMULSION INTRAVENOUS AS NEEDED
Status: DISCONTINUED | OUTPATIENT
Start: 2022-03-29 | End: 2022-03-29 | Stop reason: HOSPADM

## 2022-03-29 RX ORDER — SODIUM CHLORIDE, SODIUM LACTATE, POTASSIUM CHLORIDE, CALCIUM CHLORIDE 600; 310; 30; 20 MG/100ML; MG/100ML; MG/100ML; MG/100ML
25 INJECTION, SOLUTION INTRAVENOUS CONTINUOUS
Status: DISCONTINUED | OUTPATIENT
Start: 2022-03-29 | End: 2022-03-29 | Stop reason: HOSPADM

## 2022-03-29 RX ORDER — SUCCINYLCHOLINE CHLORIDE 20 MG/ML
INJECTION INTRAMUSCULAR; INTRAVENOUS AS NEEDED
Status: DISCONTINUED | OUTPATIENT
Start: 2022-03-29 | End: 2022-03-29 | Stop reason: HOSPADM

## 2022-03-29 RX ORDER — FENTANYL CITRATE 50 UG/ML
25 INJECTION, SOLUTION INTRAMUSCULAR; INTRAVENOUS
Status: DISCONTINUED | OUTPATIENT
Start: 2022-03-29 | End: 2022-03-29 | Stop reason: HOSPADM

## 2022-03-29 RX ORDER — FENTANYL CITRATE 50 UG/ML
INJECTION, SOLUTION INTRAMUSCULAR; INTRAVENOUS AS NEEDED
Status: DISCONTINUED | OUTPATIENT
Start: 2022-03-29 | End: 2022-03-29 | Stop reason: HOSPADM

## 2022-03-29 RX ORDER — KETOROLAC TROMETHAMINE 30 MG/ML
INJECTION, SOLUTION INTRAMUSCULAR; INTRAVENOUS AS NEEDED
Status: DISCONTINUED | OUTPATIENT
Start: 2022-03-29 | End: 2022-03-29 | Stop reason: HOSPADM

## 2022-03-29 RX ORDER — HYDROMORPHONE HYDROCHLORIDE 1 MG/ML
.2-.5 INJECTION, SOLUTION INTRAMUSCULAR; INTRAVENOUS; SUBCUTANEOUS
Status: DISCONTINUED | OUTPATIENT
Start: 2022-03-29 | End: 2022-03-29 | Stop reason: HOSPADM

## 2022-03-29 RX ORDER — ONDANSETRON 2 MG/ML
4 INJECTION INTRAMUSCULAR; INTRAVENOUS AS NEEDED
Status: DISCONTINUED | OUTPATIENT
Start: 2022-03-29 | End: 2022-03-29 | Stop reason: HOSPADM

## 2022-03-29 RX ORDER — LIDOCAINE HYDROCHLORIDE 10 MG/ML
0.1 INJECTION, SOLUTION EPIDURAL; INFILTRATION; INTRACAUDAL; PERINEURAL AS NEEDED
Status: DISCONTINUED | OUTPATIENT
Start: 2022-03-29 | End: 2022-03-29 | Stop reason: HOSPADM

## 2022-03-29 RX ORDER — ALBUMIN HUMAN 50 G/1000ML
SOLUTION INTRAVENOUS AS NEEDED
Status: DISCONTINUED | OUTPATIENT
Start: 2022-03-29 | End: 2022-03-29 | Stop reason: HOSPADM

## 2022-03-29 RX ORDER — BUPIVACAINE HYDROCHLORIDE AND EPINEPHRINE 2.5; 5 MG/ML; UG/ML
INJECTION, SOLUTION EPIDURAL; INFILTRATION; INTRACAUDAL; PERINEURAL AS NEEDED
Status: DISCONTINUED | OUTPATIENT
Start: 2022-03-29 | End: 2022-03-29 | Stop reason: HOSPADM

## 2022-03-29 RX ORDER — FENTANYL CITRATE 50 UG/ML
50 INJECTION, SOLUTION INTRAMUSCULAR; INTRAVENOUS AS NEEDED
Status: DISCONTINUED | OUTPATIENT
Start: 2022-03-29 | End: 2022-03-29 | Stop reason: HOSPADM

## 2022-03-29 RX ORDER — PHENYLEPHRINE HCL IN 0.9% NACL 0.4MG/10ML
SYRINGE (ML) INTRAVENOUS AS NEEDED
Status: DISCONTINUED | OUTPATIENT
Start: 2022-03-29 | End: 2022-03-29 | Stop reason: HOSPADM

## 2022-03-29 RX ORDER — LIDOCAINE HYDROCHLORIDE 20 MG/ML
INJECTION, SOLUTION EPIDURAL; INFILTRATION; INTRACAUDAL; PERINEURAL AS NEEDED
Status: DISCONTINUED | OUTPATIENT
Start: 2022-03-29 | End: 2022-03-29 | Stop reason: HOSPADM

## 2022-03-29 RX ORDER — CEFAZOLIN SODIUM/WATER 2 G/20 ML
SYRINGE (ML) INTRAVENOUS AS NEEDED
Status: DISCONTINUED | OUTPATIENT
Start: 2022-03-29 | End: 2022-03-29 | Stop reason: HOSPADM

## 2022-03-29 RX ORDER — DEXAMETHASONE SODIUM PHOSPHATE 4 MG/ML
INJECTION, SOLUTION INTRA-ARTICULAR; INTRALESIONAL; INTRAMUSCULAR; INTRAVENOUS; SOFT TISSUE AS NEEDED
Status: DISCONTINUED | OUTPATIENT
Start: 2022-03-29 | End: 2022-03-29 | Stop reason: HOSPADM

## 2022-03-29 RX ORDER — NEOSTIGMINE METHYLSULFATE 1 MG/ML
INJECTION, SOLUTION INTRAVENOUS AS NEEDED
Status: DISCONTINUED | OUTPATIENT
Start: 2022-03-29 | End: 2022-03-29 | Stop reason: HOSPADM

## 2022-03-29 RX ORDER — CEFAZOLIN SODIUM 1 G/3ML
2 INJECTION, POWDER, FOR SOLUTION INTRAMUSCULAR; INTRAVENOUS ONCE
Status: DISCONTINUED | OUTPATIENT
Start: 2022-03-29 | End: 2022-03-29

## 2022-03-29 RX ORDER — HYDRALAZINE HYDROCHLORIDE 20 MG/ML
INJECTION INTRAMUSCULAR; INTRAVENOUS AS NEEDED
Status: DISCONTINUED | OUTPATIENT
Start: 2022-03-29 | End: 2022-03-29 | Stop reason: HOSPADM

## 2022-03-29 RX ORDER — KETOROLAC TROMETHAMINE 30 MG/ML
15 INJECTION, SOLUTION INTRAMUSCULAR; INTRAVENOUS EVERY 6 HOURS
Status: DISCONTINUED | OUTPATIENT
Start: 2022-03-30 | End: 2022-04-02 | Stop reason: HOSPADM

## 2022-03-29 RX ORDER — GLYCOPYRROLATE 0.2 MG/ML
INJECTION INTRAMUSCULAR; INTRAVENOUS AS NEEDED
Status: DISCONTINUED | OUTPATIENT
Start: 2022-03-29 | End: 2022-03-29 | Stop reason: HOSPADM

## 2022-03-29 RX ORDER — HYDROMORPHONE HYDROCHLORIDE 2 MG/ML
INJECTION, SOLUTION INTRAMUSCULAR; INTRAVENOUS; SUBCUTANEOUS AS NEEDED
Status: DISCONTINUED | OUTPATIENT
Start: 2022-03-29 | End: 2022-03-29 | Stop reason: HOSPADM

## 2022-03-29 RX ORDER — EPHEDRINE SULFATE/0.9% NACL/PF 50 MG/5 ML
SYRINGE (ML) INTRAVENOUS AS NEEDED
Status: DISCONTINUED | OUTPATIENT
Start: 2022-03-29 | End: 2022-03-29 | Stop reason: HOSPADM

## 2022-03-29 RX ORDER — ROCURONIUM BROMIDE 10 MG/ML
INJECTION, SOLUTION INTRAVENOUS AS NEEDED
Status: DISCONTINUED | OUTPATIENT
Start: 2022-03-29 | End: 2022-03-29 | Stop reason: HOSPADM

## 2022-03-29 RX ORDER — ONDANSETRON 2 MG/ML
INJECTION INTRAMUSCULAR; INTRAVENOUS AS NEEDED
Status: DISCONTINUED | OUTPATIENT
Start: 2022-03-29 | End: 2022-03-29 | Stop reason: HOSPADM

## 2022-03-29 RX ORDER — MIDAZOLAM HYDROCHLORIDE 1 MG/ML
1 INJECTION, SOLUTION INTRAMUSCULAR; INTRAVENOUS AS NEEDED
Status: DISCONTINUED | OUTPATIENT
Start: 2022-03-29 | End: 2022-03-29 | Stop reason: HOSPADM

## 2022-03-29 RX ADMIN — DEXTROSE MONOHYDRATE, SODIUM CHLORIDE, AND POTASSIUM CHLORIDE 125 ML/HR: 50; 4.5; 1.49 INJECTION, SOLUTION INTRAVENOUS at 15:13

## 2022-03-29 RX ADMIN — SODIUM CHLORIDE, PRESERVATIVE FREE 10 ML: 5 INJECTION INTRAVENOUS at 14:00

## 2022-03-29 RX ADMIN — HYDROMORPHONE HYDROCHLORIDE 1 MG: 2 INJECTION, SOLUTION INTRAMUSCULAR; INTRAVENOUS; SUBCUTANEOUS at 08:44

## 2022-03-29 RX ADMIN — FENTANYL CITRATE 50 MCG: 50 INJECTION, SOLUTION INTRAMUSCULAR; INTRAVENOUS at 09:28

## 2022-03-29 RX ADMIN — Medication 3 MG: at 09:18

## 2022-03-29 RX ADMIN — ONDANSETRON HYDROCHLORIDE 4 MG: 2 INJECTION, SOLUTION INTRAMUSCULAR; INTRAVENOUS at 07:54

## 2022-03-29 RX ADMIN — SODIUM CHLORIDE, POTASSIUM CHLORIDE, SODIUM LACTATE AND CALCIUM CHLORIDE 25 ML/HR: 600; 310; 30; 20 INJECTION, SOLUTION INTRAVENOUS at 10:00

## 2022-03-29 RX ADMIN — Medication 80 MCG: at 07:44

## 2022-03-29 RX ADMIN — SODIUM CHLORIDE, POTASSIUM CHLORIDE, SODIUM LACTATE AND CALCIUM CHLORIDE: 600; 310; 30; 20 INJECTION, SOLUTION INTRAVENOUS at 09:13

## 2022-03-29 RX ADMIN — MORPHINE SULFATE 2 MG: 2 INJECTION, SOLUTION INTRAMUSCULAR; INTRAVENOUS at 22:00

## 2022-03-29 RX ADMIN — SODIUM CHLORIDE, POTASSIUM CHLORIDE, SODIUM LACTATE AND CALCIUM CHLORIDE 25 ML/HR: 600; 310; 30; 20 INJECTION, SOLUTION INTRAVENOUS at 08:00

## 2022-03-29 RX ADMIN — ROCURONIUM BROMIDE 20 MG: 10 INJECTION INTRAVENOUS at 07:42

## 2022-03-29 RX ADMIN — WATER 2 G: 1 INJECTION INTRAMUSCULAR; INTRAVENOUS; SUBCUTANEOUS at 08:00

## 2022-03-29 RX ADMIN — DEXTROSE MONOHYDRATE, SODIUM CHLORIDE, AND POTASSIUM CHLORIDE 125 ML/HR: 50; 4.5; 1.49 INJECTION, SOLUTION INTRAVENOUS at 01:24

## 2022-03-29 RX ADMIN — Medication 10 MG: at 08:35

## 2022-03-29 RX ADMIN — ROCURONIUM BROMIDE 10 MG: 10 INJECTION INTRAVENOUS at 07:34

## 2022-03-29 RX ADMIN — Medication 1 AMPULE: at 21:59

## 2022-03-29 RX ADMIN — SODIUM CHLORIDE 50 MCG/MIN: 900 INJECTION, SOLUTION INTRAVENOUS at 07:42

## 2022-03-29 RX ADMIN — ROCURONIUM BROMIDE 20 MG: 10 INJECTION INTRAVENOUS at 08:24

## 2022-03-29 RX ADMIN — ALBUMIN (HUMAN) 250 ML: 2.5 SOLUTION INTRAVENOUS at 08:38

## 2022-03-29 RX ADMIN — SODIUM CHLORIDE, PRESERVATIVE FREE 10 ML: 5 INJECTION INTRAVENOUS at 22:03

## 2022-03-29 RX ADMIN — GLYCOPYRROLATE 0.6 MG: 0.2 INJECTION, SOLUTION INTRAMUSCULAR; INTRAVENOUS at 09:18

## 2022-03-29 RX ADMIN — FENTANYL CITRATE 50 MCG: 50 INJECTION, SOLUTION INTRAMUSCULAR; INTRAVENOUS at 09:34

## 2022-03-29 RX ADMIN — DEXAMETHASONE SODIUM PHOSPHATE 8 MG: 4 INJECTION, SOLUTION INTRAMUSCULAR; INTRAVENOUS at 07:44

## 2022-03-29 RX ADMIN — Medication 10 MG: at 07:34

## 2022-03-29 RX ADMIN — MORPHINE SULFATE 2 MG: 2 INJECTION, SOLUTION INTRAMUSCULAR; INTRAVENOUS at 18:58

## 2022-03-29 RX ADMIN — LORAZEPAM 2 MG: 2 INJECTION INTRAMUSCULAR; INTRAVENOUS at 23:06

## 2022-03-29 RX ADMIN — Medication 1 AMPULE: at 11:00

## 2022-03-29 RX ADMIN — PROPOFOL 150 MG: 10 INJECTION, EMULSION INTRAVENOUS at 07:34

## 2022-03-29 RX ADMIN — MORPHINE SULFATE 2 MG: 2 INJECTION, SOLUTION INTRAMUSCULAR; INTRAVENOUS at 15:05

## 2022-03-29 RX ADMIN — SUCCINYLCHOLINE CHLORIDE 130 MG: 20 INJECTION, SOLUTION INTRAMUSCULAR; INTRAVENOUS at 07:34

## 2022-03-29 RX ADMIN — KETOROLAC TROMETHAMINE 15 MG: 30 INJECTION, SOLUTION INTRAMUSCULAR; INTRAVENOUS at 09:11

## 2022-03-29 RX ADMIN — Medication 2 G: at 07:27

## 2022-03-29 RX ADMIN — HYDRALAZINE HYDROCHLORIDE 10 MG: 20 INJECTION INTRAMUSCULAR; INTRAVENOUS at 09:40

## 2022-03-29 RX ADMIN — HYDROMORPHONE HYDROCHLORIDE 1 MG: 2 INJECTION, SOLUTION INTRAMUSCULAR; INTRAVENOUS; SUBCUTANEOUS at 09:28

## 2022-03-29 RX ADMIN — FENTANYL CITRATE 50 MCG: 50 INJECTION, SOLUTION INTRAMUSCULAR; INTRAVENOUS at 07:34

## 2022-03-29 RX ADMIN — LIDOCAINE HYDROCHLORIDE 80 MG: 20 INJECTION, SOLUTION EPIDURAL; INFILTRATION; INTRACAUDAL; PERINEURAL at 07:34

## 2022-03-29 NOTE — PROGRESS NOTES
Transition of Care Plan:     RUR: 14% - low risk  Disposition: Home with Significant Other   Follow up appointments: PCP, Surgery  DME needed: None  Transportation at Discharge: Significant Other Melina Pendleton [phone]  Keys or means to access home: Yes       IM Medicare Letter: 2nd IM letter before discharge  Is patient a BCPI-A Bundle: No              If yes, was Bundle Letter given?:    Is patient a [place] and connected with the [place]? No       If yes, was [employer] transfer form completed and VA notified? Caregiver Contact: Significant Other [phone]  Discharge Caregiver contacted prior to discharge? Care Conference needed?:                 Unit CM to follow up before discharge    Initial Note 1:14 pm: In review of chart, pt is not medically stable for discharge. Pt went to OR today for small bowel resection. Unit CM will continue to follow.      Radha Taylor RN, BSN, [address] [place] [address] Care Manager  [phone]

## 2022-03-29 NOTE — OP NOTES
[address]  OPERATIVE REPORT    Name:  Ziyad Romano  MR#:  [id number]  :  1940  ACCOUNT #:  [de-identified]  DATE OF SERVICE:  2022    PREOPERATIVE DIAGNOSIS:  Small-bowel obstruction. POSTOPERATIVE DIAGNOSES:  1.  Small-bowel obstruction. 2.  Small-bowel/mesenteric mass. PROCEDURE PERFORMED:  Laparoscopic converted to open small bowel resection. SURGEON:  Dung Christine MD    ASSISTANT:  Yunior Rose. ANESTHESIA:  General endotracheal.    COMPLICATIONS:  None. SPECIMENS REMOVED:  A segment of small bowel with suture identifying the distal margin. IMPLANTS:  None. ESTIMATED BLOOD LOSS:  50 mL    DRAINS:  A 16-Bulgarian Florez catheter. FINDINGS:  1. Omental adhesions over several dilated loops of small bowel in the right lower quadrant. 2.  A desmoplastic reaction of the mesentery in this cluster of small bowel loops with a transition point indicating the site of the small bowel obstruction. 3.  Conversion to open with resection of the abnormal loops of small bowel and their attached mesentery. INDICATIONS FOR PROCEDURE:  The patient is an 81-year-old man who has had 3 admissions for a small bowel obstruction in the past 2 months. During his last admission, the patient was explored laparoscopically and was found to have an omental band that was felt to be causing a small bowel obstruction. This was lysed laparoscopically and he was discharged home. The patient returns with persistent nausea, vomiting. An abdomen and pelvis CT demonstrates no change in the patient's small bowel obstruction pattern from the previous admission. The patient was taken to the operating room for exploration. DESCRIPTION OF PROCEDURE:  The patient was identified in the preoperative holding area, informed consent obtained. He was given preoperative antibiotics.   He was then taken to the operating room, placed in the supine position, underwent general endotracheal anesthesia without complication. A Florez catheter was inserted under sterile technique. The patient's arms were then tucked and all pressure points padded. The patient's abdomen was prepped and draped in usual sterile fashion. A time-out was performed to confirm the patient by name and that he was presenting for diagnostic laparoscopy, lysis of adhesions and possible bowel resection. Once this was confirmed, attention was focused at the patient's previous 5-mm trocar site in his left abdominal wall. This incision was reopened with a 11-blade scalpel. A 5-mm trocar containing a 5-mm 0-degree laparoscope to dissect the layers of the abdominal wall under direct laparoscopic vision. Entry into the abdomen was confirmed visually. Once within the abdomen, insufflation was provided through this trocar to a pressure of 15 mmHg. The patient tolerated insufflation well and there were no apparent complications. A 360-degree evaluation of the abdomen was then performed from this trocar site. The patient was noted to have multiple dilated loops of small bowel, omentum that was covering most of the right side of the abdomen obscuring view. There were no peritoneal implants and the surface anatomy of the liver appeared normal.  Attention was then focused in the left lower abdominal wall. An additional 5-mm trocar was placed under direct laparoscopic vision. A third 5-mm trocar was placed about 2 cm above the umbilicus in the midline under direct laparoscopic vision. The camera was switched to this midline incision site and attention focused on the right lower quadrant. The patient was placed with right side up and the cecum identified. The terminal ileum was then identified and then the small bowel run in a retrograde direction. The terminal ileum was completely decompressed. The patient did have evidence of creeping fat.   Approximately 40-50 cm from the terminal ileum, omentum was found adherent over an area of several loops of small bowel that appeared to be adhered together in a cluster. The omentum was elevated towards the anterior abdominal wall and a Harmonic scalpel was used to excise the edge of the omentum,  it from this cluster of small bowel. Once this was freed, the omentum was retracted cephalad. The cluster of small bowel loops were examined. There appeared to be a desmoplastic type reaction with fibrosis and contracture of the mesentery of this region. On palpation, it felt firm with instruments and it did appear to be adherent to the transverse mesocolon. Therefore, it was felt the patient would be best served with a conversion to open and in case an en bloc resection was required. Therefore, insufflation was vented and a midline incision was made starting 4 cm above the umbilicus and extending to 2 cm above the pubic symphysis. Electrocautery was used to dissect through the subcutaneous tissue. The linea alba was divided with electrocautery. The peritoneum was elevated between two DeBakey forceps and entered with Metzenbaum scissors. Surgeon's finger was then inserted and the remainder of the peritoneum opened with electrocautery. A wound protector was then inserted. The area of suspicious bowel was identified. The connection between the transverse mesocolon and the adherent loops of bowel was examined. A plane was identified. Metzenbaum scissors were used to separate this area along that plane. It was  with Metzenbaum scissors allowing the cluster of small bowel loops easily be elevated. It did appear that there was a firm mass within the center of these loops and it felt likely to be a submucosal mass, so a small bowel resection was performed. This was done by dividing the bowel proximal and distal to  this cluster of bowel using a linear DIDI stapler with a blue load. The intervening mesentery was divided with LigaSure Impact.   The specimen was oriented with a Vicryl suture on the distal margin. The specimen was then handed off to back table and sent to Pathology. A side-to-side functional end-to-end anastomosis was then performed by aligning the relatively normal looking stapled ends of the bowel [id number]. They were sutured together with 2-0 silk suture. An opening was made in each limb of the bowel using electrocautery and then the same 60 mm blue cartridge stapler was used to create the anastomosis. The common enterotomy created by firing the stapler was then closed in two layers with a 3-0 Vicryl for deep layer and interrupted 3-0 Lembert type sutures. The mesenteric defect was closed with a running 3-0 silk suture. The abdomen was then irrigated with Irrisept. This Irrisept was then aspirated and then the abdomen diluted with a liter of normal saline. The omentum was then placed over all of the exposed bowel and then the midline fascia was closed with a #1 Stratafix symmetric. The skin was closed with staples, a dry dressing was applied. The patient was extubated in the room and taken to postanesthesia care unit in stable condition. Instrument and sponge counts were correct x2.       MD PORTILLO [email or web address]  D:  03/29/2022 13:54  T:  03/29/2022 19:21  JOB #:  [id number]

## 2022-03-29 NOTE — PERIOP NOTES
[facility] from Operating Room to PACU    Report received from [address] and Kala Perry CRNA regarding Michel Nicole. Surgeon(s):  Huey Gallo MD  And Procedure(s) (LRB):  GENERAL DIAGNOSTIC LAPAROSCOPY CONVERTED TO OPEN SMALL BOWEL RESECTION (N/A)  confirmed   with allergies and dressings discussed. Anesthesia type, drugs, patient history, complications, estimated blood loss, vital signs, intake and output, and last pain medication, lines, reversal medications and temperature were reviewed. 1050 TRANSFER - OUT REPORT:    Verbal report given to [facility]) on Michel Nicole  being transferred to [facility](unit) for routine post - op       Report consisted of patients Situation, Background, Assessment and   Recommendations(SBAR). Information from the following report(s) SBAR, Kardex, OR Summary, Intake/Output, MAR and Cardiac Rhythm SR was reviewed with the receiving nurse. Lines:   Peripheral IV 03/28/22 Left Antecubital (Active)   Site Assessment Clean, dry, & intact 03/29/22 0947   Phlebitis Assessment 0 03/29/22 0947   Infiltration Assessment 0 03/29/22 0947   Dressing Status Clean, dry, & intact 03/29/22 0947   Dressing Type Transparent;Tape 03/29/22 0947   Hub Color/Line Status Green; Infusing 03/29/22 0947   Action Taken Dressing reinforced 03/28/22 2110        Opportunity for questions and clarification was provided.       Patient transported with:   O2 @ 2 liters  Tech

## 2022-03-29 NOTE — BRIEF OP NOTE
Brief Postoperative Note    Patient: Michel Nicole  YOB: 1940  MRN: [id number]    Date of Procedure: 3/29/2022     Pre-Op Diagnosis: SBO    Post-Op Diagnosis: SBO, small bowel/mesenteric mass      Procedure(s):  GENERAL DIAGNOSTIC LAPAROSCOPY CONVERTED TO OPEN SMALL BOWEL RESECTION    Surgeon(s):  Huey Gallo MD    Surgical Assistant: Surg Asst-1: Seb Barajas    Anesthesia: General     Estimated Blood Loss (mL): less than 50     Complications: None    Specimens:   ID Type Source Tests Collected by Time Destination   1 : Small bowel suture at distal margin Preservative Small Bowel  Huey Gallo MD 3/29/2022 5500 Pathology        Implants: * No implants in log *    Drains:   Nasogastric Tube 03/28/22 (Active)   Site Assessment Clean, dry, & intact 03/29/22 0638   Securement Device Adhesive-based sandy 03/29/22 0638   G Port Status Continuous Suction 03/29/22 0638   Drainage Description Sanguinous 03/29/22 0638   Output (ml) 200 ml 03/29/22 3339       Findings: 1) omentum adherent over several loops of small bowel.   2) desmoplastic like reaction of the mesentery in this cluster of small bowel loops with transition point  3) converted to open for appropriate SB resection                      Electronically Signed by Soham Hemphill MD on 3/29/2022 at 9:38 AM

## 2022-03-29 NOTE — ROUTINE PROCESS
sbar in note pt to holding area after being picked up from floor by 2 nurses. Pt identified  Before coming down stairs. ngt to lws   amall amount     bloody drainage noted in tube. rm air npc cough   Small amount of clear mucus expectorated. Hypoactive bs auscultated. Pt  Has been npo states is passing small amount flatus. michael completed. mepilex sacral border preventatively applied    Skin intact. Healed glued sutures to abdomen. covid test negative   Reports took vaccine   X3. No s/s covid virus nor has he been around anyone with the covid virus that he knows of.

## 2022-03-29 NOTE — PROGRESS NOTES
Physician Progress Note      Sada Augustine  CSN #:                  [id number]  :                       1940  ADMIT DATE:       3/28/2022 10:38 AM  DISCH DATE:  RESPONDING  PROVIDER #:        Kavon Braun MD [facility] MD          QUERY TEXT:    Pt admitted with persistent/recurrent SBO. Pt noted to have Hx: of recent laparoscopic lysis of adhesions. If possible, please document in progress notes and discharge summary:    The medical record reflects the following:  Risk Factors: Hx: recent laparoscopic lysis of adhesions  Clinical Indicators: ct abd/pelvis: Persistent, relatively stable moderately high-grade mechanical obstruction of small bowel with a transition thought to lie in the proximal to mid ileum in the right upper quadrant. .. Patrizia Garvey Noted: Patient is 10 days out from a laparoscopic lysis of adhesions for a SBO. He took several days to have return of bowel function post operative. He states he was feeling fine at discharge. .... Treatment: ct abd/pelvis: ngt; GS consult; OR Procedure    Thank you,    Luanne Coon  [facility]  [phone]  Options provided:  -- Persistent/recurrent SBO  is a postoperative complication  -- Persistent/recurrent SBO  is an expected/inherent condition that occurred postoperatively and not a complication  -- Persistent/recurrent SBO  is not a postoperative complication, but is due to other incidental risk factor, Please specify other incidental risk factor  -- Other - I will add my own diagnosis  -- Disagree - Not applicable / Not valid  -- Disagree - Clinically unable to determine / Unknown  -- Refer to Clinical Documentation Reviewer    PROVIDER RESPONSE TEXT:    Provider is clinically unable to determine a response to this query.     Query created by: Minerva Elias on 3/29/2022 1:18 PM      Electronically signed by:  Kavon Braun MD [facility] MD 3/29/2022 1:20 PM

## 2022-03-29 NOTE — PROGRESS NOTES
SHIFT REPORT  Shift report given to Perla Pena RN(in coming nurse) by Chano Crane RN(Outgoing nurse)    Report consisted of patients Situation, Background, Assessment and   Recommendations(SBAR).    Information from the following report(s) SBAR, Kardex, ED Summary, [facility] and Recent Results was reviewed with the receiving nurse. Patient is alert and oriented X4. Plan of care reviewed with patient. Admission process completed and oriented to room. Dual skin assessment performed with Perla Pena RN. NG-Tube drained 200ml . Patient finally left the unit for surgery @9709.

## 2022-03-29 NOTE — PERIOP NOTES
Incision time for open surgery: 0818    Irrisept used as irrigation intraoperatively.   LOT: [id number]  EXP: 2023-12-31

## 2022-03-29 NOTE — PROGRESS NOTES
Comprehensive Nutrition Assessment    Type and Reason for Visit: Initial,Positive nutrition screen    Nutrition Recommendations/Plan:   Advance diet as medically able per surgeon    Nutrition Assessment:   Pt admitted with SBO. PMH: HTN, CAD, recent PACO 11 days ago for SBO. MST triggered for wt loss. Chart reviewed, pt off the floor in OR at time of attempted visit. He is s/p small bowel resection. Per EMR pt has had ~5lb wt loss since beginning of February, wt loss of 2.7% is mild given the timeframe. NGT to suction currently. Labs reviewed. Will monitor readiness for diet advancement and need for nutrition support upon F/U. Wt Readings from Last 10 Encounters:   03/28/22 81.7 kg (180 lb 1.9 oz)   03/18/22 82.6 kg (181 lb 15.8 oz)   03/10/22 82.2 kg (181 lb 3.5 oz)   03/03/22 83.1 kg (183 lb 3.2 oz)   02/08/22 83.9 kg (185 lb)   07/18/19 88.9 kg (196 lb)   03/22/19 88.5 kg (195 lb)   03/21/19 90.5 kg (199 lb 8.3 oz)   07/19/12 93.7 kg (206 lb 9.1 oz)       Estimated Daily Nutrient Needs:  Energy (kcal): MSJ 1800 (1470 x 1.2); Weight Used for Energy Requirements: Current  Protein (g): 82g (1gPro/kg); Weight Used for Protein Requirements: Current  Fluid (ml/day): 1800mL; Method Used for Fluid Requirements: 1 ml/kcal      Nutrition Related Findings:  Meds: ancef, kCl, D5, [email or web address]. BM PTA      Wounds:    Surgical incision       Current Nutrition Therapies:  DIET NPO    Anthropometric Measures:  · Height:  5' 6\" (167.6 cm)  · Current Body Wt:  81.7 kg (180 lb 1.9 oz)   · Usual Body Wt:  83.9 kg (185 lb)     · Ideal Body Wt:  142 lbs:  126.8 %    · BMI Category: Overweight (BMI 25.0-29. 9)       Nutrition Diagnosis:   · Altered GI function related to altered GI function,altered GI structure as evidenced by NPO or clear liquid status due to medical condition      Nutrition Interventions:   Food and/or Nutrient Delivery: Continue NPO  Nutrition Education and Counseling: No recommendations at this time  Coordination of Nutrition Care: Continue to monitor while inpatient    Goals:  Plan of care will be determined re: nutrition in 1-3 days. Nutrition Monitoring and Evaluation:   Behavioral-Environmental Outcomes: None identified  Food/Nutrient Intake Outcomes: Diet advancement/tolerance  Physical Signs/Symptoms Outcomes: Biochemical data,Nutrition focused physical findings,Skin,Weight,GI status    Discharge Planning:     Too soon to determine     Electronically signed by Oniel Helm RD, [address]  on 3/29/2022 at 3:40 PM    Contact: ext [phone]

## 2022-03-29 NOTE — ANESTHESIA POSTPROCEDURE EVALUATION
Procedure(s):  GENERAL DIAGNOSTIC LAPAROSCOPY CONVERTED TO OPEN SMALL BOWEL RESECTION. general    Anesthesia Post Evaluation      Multimodal analgesia: multimodal analgesia used between 6 hours prior to anesthesia start to PACU discharge  Patient location during evaluation: PACU  Level of consciousness: sleepy but conscious  Pain management: adequate  Airway patency: patent  Anesthetic complications: no  Cardiovascular status: acceptable  Respiratory status: acceptable  Hydration status: acceptable  Comments: +Post-Anesthesia Evaluation and Assessment    Patient: Sonya Danielson MRN: [id number]  SSN: [id number]   YOB: 1940  Age: [de-identified] y.o. Sex: male      Cardiovascular Function/Vital Signs    /69   Pulse 78   Temp 36.7 °C (98 °F)   Resp 13   Ht 5' 6\" (1.676 m)   Wt 81.7 kg (180 lb 1.9 oz)   SpO2 93%   BMI 29.07 kg/m²     Patient is status post Procedure(s):  GENERAL DIAGNOSTIC LAPAROSCOPY CONVERTED TO OPEN SMALL BOWEL RESECTION. Nausea/Vomiting: Controlled. Postoperative hydration reviewed and adequate. Pain:  Pain Scale 1: Numeric (0 - 10) (03/29/22 1030)  Pain Intensity 1: 0 (03/29/22 1030)   Managed. Neurological Status:   Neuro (WDL): Exceptions to WDL (03/29/22 0947)   At baseline. Mental Status and Level of Consciousness: Arousable. Pulmonary Status:   O2 Device: Nasal cannula (03/29/22 1030)   Adequate oxygenation and airway patent. Complications related to anesthesia: None    Post-anesthesia assessment completed. No concerns. Signed By: Rosamaria Oconnor MD    3/29/2022  Post anesthesia nausea and vomiting:  controlled  Final Post Anesthesia Temperature Assessment:  Normothermia (36.0-37.5 degrees C)      INITIAL Post-op Vital signs:   Vitals Value Taken Time   /69 03/29/22 1030   Temp 36.7 °C (98 °F) 03/29/22 0947   Pulse 84 03/29/22 1031   Resp 15 03/29/22 1031   SpO2 95 % 03/29/22 1031   Vitals shown include unvalidated device data.

## 2022-03-30 LAB
ANION GAP SERPL CALC-SCNC: 4 MMOL/L (ref 5–15)
BACTERIA SPEC CULT: NORMAL
BUN SERPL-MCNC: 14 MG/DL (ref 6–20)
BUN/CREAT SERPL: 18 (ref 12–20)
CALCIUM SERPL-MCNC: 7.9 MG/DL (ref 8.5–10.1)
CC UR VC: NORMAL
CHLORIDE SERPL-SCNC: 110 MMOL/L (ref 97–108)
CO2 SERPL-SCNC: 26 MMOL/L (ref 21–32)
CREAT SERPL-MCNC: 0.79 MG/DL (ref 0.7–1.3)
ERYTHROCYTE [DISTWIDTH] IN BLOOD BY AUTOMATED COUNT: 13.2 % (ref 11.5–14.5)
GLUCOSE SERPL-MCNC: 101 MG/DL (ref 65–100)
HCT VFR BLD AUTO: 36.4 % (ref 36.6–50.3)
HGB BLD-MCNC: 11.6 G/DL (ref 12.1–17)
MCH RBC QN AUTO: 29.4 PG (ref 26–34)
MCHC RBC AUTO-ENTMCNC: 31.9 G/DL (ref 30–36.5)
MCV RBC AUTO: 92.2 FL (ref 80–99)
NRBC # BLD: 0 K/UL (ref 0–0.01)
NRBC BLD-RTO: 0 PER 100 WBC
PLATELET # BLD AUTO: 199 K/UL (ref 150–400)
PMV BLD AUTO: 10.3 FL (ref 8.9–12.9)
POTASSIUM SERPL-SCNC: 4.5 MMOL/L (ref 3.5–5.1)
RBC # BLD AUTO: 3.95 M/UL (ref 4.1–5.7)
SERVICE CMNT-IMP: NORMAL
SODIUM SERPL-SCNC: 140 MMOL/L (ref 136–145)
WBC # BLD AUTO: 13.3 K/UL (ref 4.1–11.1)

## 2022-03-30 PROCEDURE — 74011000250 HC RX REV CODE- 250: Performed by: SURGERY

## 2022-03-30 PROCEDURE — 36415 COLL VENOUS BLD VENIPUNCTURE: CPT

## 2022-03-30 PROCEDURE — 85027 COMPLETE CBC AUTOMATED: CPT

## 2022-03-30 PROCEDURE — 80048 BASIC METABOLIC PNL TOTAL CA: CPT

## 2022-03-30 PROCEDURE — 65270000029 HC RM PRIVATE

## 2022-03-30 PROCEDURE — 74011250636 HC RX REV CODE- 250/636: Performed by: SURGERY

## 2022-03-30 PROCEDURE — 74011250637 HC RX REV CODE- 250/637: Performed by: SURGERY

## 2022-03-30 RX ORDER — ENOXAPARIN SODIUM 100 MG/ML
40 INJECTION SUBCUTANEOUS EVERY 24 HOURS
Status: DISCONTINUED | OUTPATIENT
Start: 2022-03-31 | End: 2022-04-02 | Stop reason: HOSPADM

## 2022-03-30 RX ADMIN — KETOROLAC TROMETHAMINE 15 MG: 30 INJECTION, SOLUTION INTRAMUSCULAR at 23:13

## 2022-03-30 RX ADMIN — MORPHINE SULFATE 2 MG: 2 INJECTION, SOLUTION INTRAMUSCULAR; INTRAVENOUS at 11:31

## 2022-03-30 RX ADMIN — SODIUM CHLORIDE, PRESERVATIVE FREE 10 ML: 5 INJECTION INTRAVENOUS at 17:38

## 2022-03-30 RX ADMIN — KETOROLAC TROMETHAMINE 15 MG: 30 INJECTION, SOLUTION INTRAMUSCULAR at 13:38

## 2022-03-30 RX ADMIN — KETOROLAC TROMETHAMINE 15 MG: 30 INJECTION, SOLUTION INTRAMUSCULAR at 00:07

## 2022-03-30 RX ADMIN — SODIUM CHLORIDE, PRESERVATIVE FREE 10 ML: 5 INJECTION INTRAVENOUS at 06:00

## 2022-03-30 RX ADMIN — KETOROLAC TROMETHAMINE 15 MG: 30 INJECTION, SOLUTION INTRAMUSCULAR at 17:37

## 2022-03-30 RX ADMIN — MORPHINE SULFATE 2 MG: 2 INJECTION, SOLUTION INTRAMUSCULAR; INTRAVENOUS at 18:44

## 2022-03-30 RX ADMIN — DEXTROSE MONOHYDRATE, SODIUM CHLORIDE, AND POTASSIUM CHLORIDE 125 ML/HR: 50; 4.5; 1.49 INJECTION, SOLUTION INTRAVENOUS at 13:39

## 2022-03-30 RX ADMIN — KETOROLAC TROMETHAMINE 15 MG: 30 INJECTION, SOLUTION INTRAMUSCULAR at 05:35

## 2022-03-30 RX ADMIN — DEXTROSE MONOHYDRATE, SODIUM CHLORIDE, AND POTASSIUM CHLORIDE 125 ML/HR: 50; 4.5; 1.49 INJECTION, SOLUTION INTRAVENOUS at 02:31

## 2022-03-30 RX ADMIN — LORAZEPAM 2 MG: 2 INJECTION INTRAMUSCULAR; INTRAVENOUS at 23:13

## 2022-03-30 RX ADMIN — SODIUM CHLORIDE, PRESERVATIVE FREE 10 ML: 5 INJECTION INTRAVENOUS at 22:00

## 2022-03-30 RX ADMIN — Medication 1 AMPULE: at 23:13

## 2022-03-30 RX ADMIN — Medication 1 AMPULE: at 09:00

## 2022-03-30 NOTE — PROGRESS NOTES
Transition of Care Plan:     RUR: 13% - low risk  Disposition: Home with Significant Other   Follow up appointments: PCP, Surgery  DME needed: None  Transportation at [address] [phone]  [place] or means to access home: Yes       IM Medicare Letter: 2nd IM letter before discharge  Is patient a BCPI-A Bundle: No              If yes, was Bundle Letter given?:    Is patient a [place] and connected with the [facility]?   No       If yes, was [employer] transfer form completed and VA notified? Caregiver Contact: Significant Other [phone]  Discharge Caregiver contacted prior to discharge? Care Conference needed? :                 Unit CM to follow up before discharge     Initial Note 09:40 am: In review of chart, pt is not medically stable for discharge. Barriers: return of bowel function. Unit CM will continue to follow.      Rhonda Stone RN, BSN, [address] [place] [address] Care Manager  [phone]

## 2022-03-30 NOTE — PROGRESS NOTES
Spiritual Care Partner Volunteer visited patient at [address] in [facility] 3 SURG TELE on 3/30/2022   Documented by:     SAMARIA Mccain, [facility], Staff [address]    [address] Paging Service  [phone]

## 2022-03-30 NOTE — PROGRESS NOTES
Bedside shift report given to Aster Baez RN by Deangelo Hanna LPN    Patient ambulated in the hallway and tolerated it well. NG tube taken out at 2130 by Dr. Diedre Sever. Arabella dressing box is flashing orange, notified Dr. Diedre Sever. He came by to assess it. Per Chandan Rosario dressing is intact and functional. IV site infiltrated, new IV placed in the left hand. PRN Morphine given x 1.         Deangelo Hanna LPN

## 2022-03-30 NOTE — PROGRESS NOTES
Patient returned from surgery late this morning. Patient sleeping for a few hours, O2 via NC at 2 LPM left on for comfort while sleeping. PRN morphine given x 2. Patient c/o discomfort from NGT, XR ordered to verify placement. Patient said shortly after that it felt \"okay. \" Does not appear out of place, is secure with tape and safety pin. No drainage. No c/o nausea. Report given to [facility], LPN.

## 2022-03-30 NOTE — PROGRESS NOTES
SURGERY PROGRESS NOTE      Admit Date: 3/28/2022    POD 1 Day Post-Op    Procedure: Procedure(s):  GENERAL DIAGNOSTIC LAPAROSCOPY CONVERTED TO OPEN SMALL BOWEL RESECTION      Subjective:     Patient complains of bloating and some abdominal pain. Denies nausea. No flatus       Objective:     Visit Vitals  BP (!) 125/51   Pulse (!) 51   Temp 98 °F (36.7 °C)   Resp 17   Ht 5' 6\" (1.676 m)   Wt 81.7 kg (180 lb 1.9 oz)   SpO2 97%   BMI 29.07 kg/m²        Temp (24hrs), Av °F (36.7 °C), Min:97.8 °F (36.6 °C), Max:98.4 °F (36.9 °C)      No intake/output data recorded.    1901 -  0700  In: 1220 [I.V.:1220]  Out: 1400 [Urine:500]    Physical Exam:    General:  alert, cooperative, no distress, appears stated age   Abdomen: Soft,distended, tympanic, bowel sounds hypoactive, appropriately tender   Incision:   healing well, no drainage, no erythema, no hernia, no seroma, no swelling, no dehiscence, incision well approximated           Lab Results   Component Value Date/Time    WBC 13.3 (H) 2022 02:39 AM    HGB 11.6 (L) 2022 02:39 AM    HCT 36.4 (L) 2022 02:39 AM    PLATELET 700  02:39 AM    MCV 92.2 2022 02:39 AM     Lab Results   Component Value Date/Time    GFR est non-AA >60 2022 02:39 AM    GFR est AA >60 2022 02:39 AM    Creatinine 0.79 2022 02:39 AM    BUN 14 2022 02:39 AM    Sodium 140 2022 02:39 AM    Potassium 4.5 2022 02:39 AM    Chloride 110 (H) 2022 02:39 AM    CO2 26 2022 02:39 AM    Magnesium 2.0 2009 03:20 AM    Phosphorus 3.4 2009 09:30 AM       Assessment:     Active Problems:    SBO (small bowel obstruction) ([facility] (3/16/2022)    recovering as expected on POD#1     Plan:       Continue present treatment

## 2022-03-30 NOTE — PROGRESS NOTES
Patient's NG was pull back accidentally with activity. He has had no output since surgery. He denies nausea and states he feels better than pre-op so, I removed NG. There is some blood in the CAMERON dressing. The control box is flashing orange.  There is no appreciable leak and the dressing is holding suction so, NTD

## 2022-03-31 PROCEDURE — 65270000029 HC RM PRIVATE

## 2022-03-31 PROCEDURE — 74011250637 HC RX REV CODE- 250/637: Performed by: SURGERY

## 2022-03-31 PROCEDURE — 74011250636 HC RX REV CODE- 250/636: Performed by: SURGERY

## 2022-03-31 PROCEDURE — 74011000250 HC RX REV CODE- 250: Performed by: SURGERY

## 2022-03-31 RX ADMIN — SODIUM CHLORIDE, PRESERVATIVE FREE 10 ML: 5 INJECTION INTRAVENOUS at 06:00

## 2022-03-31 RX ADMIN — MORPHINE SULFATE 2 MG: 2 INJECTION, SOLUTION INTRAMUSCULAR; INTRAVENOUS at 16:02

## 2022-03-31 RX ADMIN — Medication 1 AMPULE: at 22:07

## 2022-03-31 RX ADMIN — KETOROLAC TROMETHAMINE 15 MG: 30 INJECTION, SOLUTION INTRAMUSCULAR at 06:34

## 2022-03-31 RX ADMIN — DEXTROSE MONOHYDRATE, SODIUM CHLORIDE, AND POTASSIUM CHLORIDE 75 ML/HR: 50; 4.5; 1.49 INJECTION, SOLUTION INTRAVENOUS at 16:02

## 2022-03-31 RX ADMIN — SODIUM CHLORIDE, PRESERVATIVE FREE 10 ML: 5 INJECTION INTRAVENOUS at 18:48

## 2022-03-31 RX ADMIN — ENOXAPARIN SODIUM 40 MG: 40 INJECTION SUBCUTANEOUS at 10:14

## 2022-03-31 RX ADMIN — KETOROLAC TROMETHAMINE 15 MG: 30 INJECTION, SOLUTION INTRAMUSCULAR at 12:23

## 2022-03-31 RX ADMIN — SODIUM CHLORIDE, PRESERVATIVE FREE 10 ML: 5 INJECTION INTRAVENOUS at 22:11

## 2022-03-31 RX ADMIN — KETOROLAC TROMETHAMINE 15 MG: 30 INJECTION, SOLUTION INTRAMUSCULAR at 18:48

## 2022-03-31 RX ADMIN — LORAZEPAM 2 MG: 2 INJECTION INTRAMUSCULAR; INTRAVENOUS at 22:06

## 2022-03-31 RX ADMIN — Medication 1 AMPULE: at 10:14

## 2022-03-31 NOTE — PROGRESS NOTES
Transition of Care Plan:     RUR: 13% - low risk  Disposition: Home with Significant Other   Follow up appointments: PCP, Surgery  DME needed: None  Transportation at [address] [phone]  [place] or means to access home: Yes       IM Medicare Letter: 2nd IM letter before discharge  Is patient a BCPI-A Bundle: No              If yes, was Bundle Letter given?:    Is patient a [place] and connected with the [facility]?   No       If yes, was [employer] transfer form completed and VA notified? Caregiver Contact: Significant Other [phone]  Discharge Caregiver contacted prior to discharge? Care Conference needed? :                 Unit CM to follow up before discharge     Initial Note 08:35 am: In review of chart, pt is not medically stable for discharge. Barriers: return of bowel function. Unit CM will continue to follow.      Celio Mejía RN, BSN, [address] [place] [address] Care Manager  [phone]
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
## 2022-03-31 NOTE — PROGRESS NOTES
End of Shift Note    Bedside shift change report given to  (oncoming nurse) by Jesus Alberto Meade RN (offgoing nurse). Report included the following information SBAR and Kardex    Shift worked:  9997-4337     Shift summary and any significant changes:    Pt slept through night, asked to ambulate halls     Concerns for physician to address:       Zone phone for oncoming shift:          Activity:  Activity Level: Up with Assistance  Number times ambulated in hallways past shift: 0  Number of times OOB to chair past shift: 0    Cardiac:   Cardiac Monitoring: No      Cardiac Rhythm: Sinus Rhythm    Access:   Current line(s): PIV     Genitourinary:   Urinary status: voiding    Respiratory:   O2 Device: None (Room air)  Chronic home O2 use?: NO  Incentive spirometer at bedside: YES       GI:  Last Bowel Movement Date: 03/28/22  Current diet:  DIET NPO Sips of Clear Liquids, Popsicles  Passing flatus: YES  Tolerating current diet: YES       Pain Management:   Patient states pain is manageable on current regimen: YES    Skin:  Edson Score: 22  Interventions: float heels and increase time out of bed    Patient Safety:  Fall Score:  Total Score: 2  Interventions: assistive device (walker, cane, etc), gripper socks and pt to call before getting OOB  High Fall Risk: Yes    Length of Stay:  Expected LOS: 3d 7h  Actual LOS: 3      Jesus Alberto Meade RN

## 2022-04-01 VITALS
HEIGHT: 66 IN | RESPIRATION RATE: 17 BRPM | SYSTOLIC BLOOD PRESSURE: 143 MMHG | WEIGHT: 180.12 LBS | TEMPERATURE: 98.2 F | DIASTOLIC BLOOD PRESSURE: 52 MMHG | BODY MASS INDEX: 28.95 KG/M2 | HEART RATE: 53 BPM | OXYGEN SATURATION: 97 %

## 2022-04-01 PROCEDURE — 74011250636 HC RX REV CODE- 250/636: Performed by: SURGERY

## 2022-04-01 PROCEDURE — 74011250637 HC RX REV CODE- 250/637: Performed by: SURGERY

## 2022-04-01 PROCEDURE — 65270000029 HC RM PRIVATE

## 2022-04-01 PROCEDURE — 99024 POSTOP FOLLOW-UP VISIT: CPT | Performed by: SURGERY

## 2022-04-01 PROCEDURE — 74011000250 HC RX REV CODE- 250: Performed by: SURGERY

## 2022-04-01 RX ORDER — HYDROCODONE BITARTRATE AND ACETAMINOPHEN 5; 325 MG/1; MG/1
1 TABLET ORAL
Qty: 10 TABLET | Refills: 0 | Status: SHIPPED | OUTPATIENT
Start: 2022-04-01 | End: 2022-04-04

## 2022-04-01 RX ADMIN — ENOXAPARIN SODIUM 40 MG: 40 INJECTION SUBCUTANEOUS at 08:28

## 2022-04-01 RX ADMIN — Medication 1 AMPULE: at 08:29

## 2022-04-01 RX ADMIN — SODIUM CHLORIDE, PRESERVATIVE FREE 10 ML: 5 INJECTION INTRAVENOUS at 06:45

## 2022-04-01 RX ADMIN — KETOROLAC TROMETHAMINE 15 MG: 30 INJECTION, SOLUTION INTRAMUSCULAR at 11:42

## 2022-04-01 RX ADMIN — KETOROLAC TROMETHAMINE 15 MG: 30 INJECTION, SOLUTION INTRAMUSCULAR at 00:59

## 2022-04-01 RX ADMIN — KETOROLAC TROMETHAMINE 15 MG: 30 INJECTION, SOLUTION INTRAMUSCULAR at 17:21

## 2022-04-01 RX ADMIN — DEXTROSE MONOHYDRATE, SODIUM CHLORIDE, AND POTASSIUM CHLORIDE 75 ML/HR: 50; 4.5; 1.49 INJECTION, SOLUTION INTRAVENOUS at 06:47

## 2022-04-01 RX ADMIN — KETOROLAC TROMETHAMINE 15 MG: 30 INJECTION, SOLUTION INTRAMUSCULAR at 06:44

## 2022-04-01 NOTE — PROGRESS NOTES
Spiritual Care Assessment/Progress Note  [facility]      NAME: Belia Adrian      MRN: [id number]  AGE: [de-identified[age] y.o.  SEX: male  Religion Affiliation: Non Jew   Language: English     4/1/2022     Total Time (in minutes): 28     Spiritual Assessment begun in [facility] 3 SURG TELE through conversation with:         [x]Patient        [] Family    [] Friend(s)        Reason for Consult: Initial/Spiritual assessment, patient floor     Spiritual beliefs: (Please include comment if needed)     [x] Identifies with a rachele tradition:   [address] [place] [address]      [] Supported by a rachele community:            [] Claims no spiritual orientation:           [] Seeking spiritual identity:                [] Adheres to an individual form of spirituality:           [] Not able to assess:                           Identified resources for coping:      [x] Prayer                               [] Music                  [] Guided Imagery     [] Family/friends                 [] Pet visits     [x] Devotional reading                         [] Unknown     [] Other:                                             Interventions offered during this visit: (See comments for more details)    Patient Interventions: Normalization of emotional/spiritual concerns,Prayer (assurance of),Initial/Spiritual assessment, patient floor,Coping skills reviewed/reinforced,Religion beliefs/image of God discussed,Integration of medical assessment with existing values and beliefs,Affirmation of rachele,Catharsis/review of pertinent events in supportive environment,Iconic (affirming the presence of God/Higher Power)           Plan of Care:     [] Support spiritual and/or cultural needs    [] Support AMD and/or advance care planning process      [] Support grieving process   [] Coordinate Rites and/or Rituals    [] Coordination with community clergy   [] No spiritual needs identified at this time   [] Detailed Plan of Care below (See Comments)  [] Make referral to Music Therapy  [] Make referral to Pet Therapy     [] Make referral to Addiction services  [] Make referral to [facility]  [] Make referral to Spiritual Care Partner  [] No future visits requested        [x] Contact Spiritual Care for further referrals     Comments:  Patient, Mr. Krish Traore was in bed and appeared comfortable when  visited for initial spiritual assessment. He remembered  from a previous visit, he shared his hospitalization story. He's had multiple surgeries and was now begining to feel well. He expressed appreciation for the medical staff and indicated that his rachele has greatly helped him cope. He shared that he tries to remained positive and appreciative of the opportunities and resources available that has helped in his treatment.  celebrated his progress, encouraged continuous self care and offered assurance of prayer. Patient stated that 's  visit meant a lot to him and he expressed appreciation. Visited by: Antoine Dalton.    Paging Service: [phone]

## 2022-04-01 NOTE — PROGRESS NOTES
Problem: Falls - Risk of  Goal: *Absence of Falls  Description: Document Mahsa Cheney Fall Risk and appropriate interventions in the flowsheet.   Outcome: Progressing Towards Goal  Note: Fall Risk Interventions:  Mobility Interventions: Patient to call before getting OOB         Medication Interventions: Teach patient to arise slowly    Elimination Interventions: Call light in reach    History of Falls Interventions: Evaluate medications/consider consulting pharmacy,Room close to nurse's station         Problem: Patient Education: Go to Patient Education Activity  Goal: Patient/Family Education  Outcome: Progressing Towards Goal

## 2022-04-01 NOTE — PROGRESS NOTES
Transition of Care Plan:     RUR: 14% - low risk  Disposition: Home with Significant Other   Follow up appointments: PCP, Surgery  DME needed: None  Transportation at [address] [phone]  [place] or means to access home: Yes       IM Medicare Letter: 2nd IM letter - received and signed 4/1. Is patient a BCPI-A Bundle: No              If yes, was Bundle Letter given?:    Is patient a [place] and connected with the [facility]?   No       If yes, was [place] transfer form completed and VA notified? Caregiver Contact: Significant Other [phone]  Discharge Caregiver contacted prior to discharge? Pt is updating. Care Conference needed? Jaya Douglass    Updated Note 4:55 pm: 2nd IM Medicare letter - CM gave pt Medicare letter and explained. Pt verbalized understanding of his rights and signed letter (in chart). Pt has copy of letter.      Initial Note 09:00 am: In review of chart, pt is not medically stable for discharge. Barriers: return of bowel function. Unit CM will continue to follow.      Cameron Canseco, RN, BSN, [address] [place] [address] Care Manager  [phone]

## 2022-04-01 NOTE — PROGRESS NOTES
End of shift report given to [employer]. Pt. Up ambulating in hallway this afternoon. Tolerated well.
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
## 2022-04-01 NOTE — PROGRESS NOTES
End of Shift Note    Bedside shift change report given to Temo Guzman (oncoming nurse) by Aruna Diehl RN (offgoing nurse). Report included the following information SBAR, Kardex, Intake/Output and MAR    Shift worked:  7p-7a     Shift summary and any significant changes:     Patient rested throughout night no complaints of pain. Concerns for physician to address:  N/A     Zone phone for oncoming shift:   [phone]       Activity:  Activity Level: Up with Assistance  Number times ambulated in hallways past shift: 0  Number of times OOB to chair past shift: 1    Cardiac:   Cardiac Monitoring: No      Cardiac Rhythm: Sinus Rhythm    Access:   Current line(s): PIV     Genitourinary:   Urinary status: voiding    Respiratory:   O2 Device: None (Room air)  Chronic home O2 use?: N/A  Incentive spirometer at bedside: NO       GI:  Last Bowel Movement Date: 03/28/22  Current diet:  ADULT DIET Full Liquid  Passing flatus: YES  Tolerating current diet: YES       Pain Management:   Patient states pain is manageable on current regimen: YES    Skin:  Edson Score: 22  Interventions: increase time out of bed    Patient Safety:  Fall Score:  Total Score: 1  Interventions: gripper socks  High Fall Risk: Yes    Length of Stay:  Expected LOS: 3d 7h  Actual LOS: Logan Valencia RN
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
## 2022-04-04 ENCOUNTER — PATIENT OUTREACH (OUTPATIENT)
Dept: CASE MANAGEMENT | Age: 82
End: 2022-04-04

## 2022-04-04 NOTE — PROGRESS NOTES
Care Transitions Outreach Attempt    Call within 2 business days of discharge: Yes   Attempted to reach patient for transitions of care follow up. Unable to reach patient. Patient: Filippo Galvan Patient : 1940 MRN: [id number]    Last Discharge [facility]       Complaint Diagnosis Description Type Department Provider    3/28/22 Abdominal Pain SBO (small bowel obstruction) ([facility] [address] . .. ED to Hosp-Admission (Discharged) (ADMIT) Viktoriya Jefferson MD; Carlos Solomon... Was this an external facility discharge? No [id number] [facility]      Noted following upcoming appointments from discharge chart review:   [facility] follow up appointment(s): No future appointments.   Non-[facility] follow up appointment(s): None

## 2022-04-05 ENCOUNTER — PATIENT OUTREACH (OUTPATIENT)
Dept: CASE MANAGEMENT | Age: 82
End: 2022-04-05

## 2022-04-05 NOTE — PROGRESS NOTES
2022 at 10:21am:  Care Transitions Outreach Attempt    Call within 2 business days of discharge: Yes   Care Transitions Nurse made second attempt to reach patient by phone for transitions of care follow-up. Unable to reach patient, and patient does not have a current PHI form scanned into his chart at this time. Patient: Ruth Ann Troy Patient : 1940 MRN: [id number]    Last Discharge [facility]       Complaint Diagnosis Description Type Department Provider    3/28/22 Abdominal Pain SBO (small bowel obstruction) ([facility] [address] . .. ED to Hosp-Admission (Discharged) (ADMIT) Milad Price MD; Gabriel Aceves... Was this an external facility discharge? No     Noted following upcoming appointments from discharge chart review:   [facility] follow up appointment(s): No future appointments. Non-[facility] follow up appointment(s): Per chart, patient has appointment scheduled at [facility] on 2022.
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
## 2022-04-14 ENCOUNTER — OFFICE VISIT (OUTPATIENT)
Dept: SURGERY | Age: 82
End: 2022-04-14
Payer: MEDICARE

## 2022-04-14 VITALS
DIASTOLIC BLOOD PRESSURE: 68 MMHG | TEMPERATURE: 97.3 F | RESPIRATION RATE: 16 BRPM | SYSTOLIC BLOOD PRESSURE: 146 MMHG | WEIGHT: 178 LBS | HEIGHT: 66 IN | HEART RATE: 68 BPM | OXYGEN SATURATION: 98 % | BODY MASS INDEX: 28.61 KG/M2

## 2022-04-14 DIAGNOSIS — Z09 POSTOPERATIVE EXAMINATION: ICD-10-CM

## 2022-04-14 DIAGNOSIS — K56.609 SMALL BOWEL OBSTRUCTION (HCC): Primary | ICD-10-CM

## 2022-04-14 PROCEDURE — 99024 POSTOP FOLLOW-UP VISIT: CPT | Performed by: SURGERY

## 2022-04-14 NOTE — PROGRESS NOTES
Identified pt with two pt identifiers(name and ). Reviewed record in preparation for visit and have obtained necessary documentation. All patient medications has been reviewed. Chief Complaint   Patient presents with    Surgical Follow-up     GENERAL DIAGNOSTIC LAPAROSCOPY CONVERTED TO OPEN SMALL BOWEL RESECTION  3/29/22       Health Maintenance Due   Topic    Lipid Screen     Shingrix Vaccine Age 50> (1 of 2)    Medicare Yearly Exam     Pneumococcal 65+ years (2 - PPSV23 or PCV20)       Vitals:    22 1351 22 1411   BP: (!) 158/72 (!) 146/68   Pulse: 68    Resp: 16    Temp: 97.3 °F (36.3 °C)    TempSrc: Temporal    SpO2: 98%    Weight: 80.7 kg (178 lb)    Height: 5' 6\" (1.676 m)    PainSc:   0 - No pain        4. Have you been to the ER, urgent care clinic since your last visit? Hospitalized since your last visit? No    5. Have you seen or consulted any other health care providers outside of the [address] [place] [address] since your last visit? Include any pap smears or colon screening. No      Patient is accompanied by self I have received verbal consent from Juan Carlos Rodríguez to discuss any/all medical information while they are present in the room.

## 2022-06-23 ENCOUNTER — TRANSCRIBE ORDER (OUTPATIENT)
Dept: SCHEDULING | Age: 82
End: 2022-06-23

## 2022-06-23 DIAGNOSIS — K52.9 ILEITIS: ICD-10-CM

## 2022-06-23 DIAGNOSIS — K56.609 SMALL BOWEL OBSTRUCTION (HCC): Primary | ICD-10-CM

## 2022-12-13 ENCOUNTER — HOSPITAL ENCOUNTER (EMERGENCY)
Age: 82
Discharge: HOME | End: 2022-12-13
Attending: EMERGENCY MEDICINE
Payer: MEDICARE

## 2022-12-13 ENCOUNTER — APPOINTMENT (OUTPATIENT)
Dept: CT IMAGING | Age: 82
End: 2022-12-13
Attending: EMERGENCY MEDICINE
Payer: MEDICARE

## 2022-12-13 VITALS
RESPIRATION RATE: 15 BRPM | OXYGEN SATURATION: 98 % | WEIGHT: 176.81 LBS | TEMPERATURE: 97.5 F | HEIGHT: 66 IN | BODY MASS INDEX: 28.42 KG/M2 | HEART RATE: 69 BPM | SYSTOLIC BLOOD PRESSURE: 125 MMHG | DIASTOLIC BLOOD PRESSURE: 55 MMHG

## 2022-12-13 DIAGNOSIS — R19.7 DIARRHEA, UNSPECIFIED TYPE: Primary | ICD-10-CM

## 2022-12-13 DIAGNOSIS — E86.0 DEHYDRATION: ICD-10-CM

## 2022-12-13 LAB
ALBUMIN SERPL-MCNC: 3.8 G/DL (ref 3.5–5)
ALBUMIN/GLOB SERPL: 1 {RATIO} (ref 1.1–2.2)
ALP SERPL-CCNC: 104 U/L (ref 45–117)
ALT SERPL-CCNC: 21 U/L (ref 12–78)
ANION GAP SERPL CALC-SCNC: 5 MMOL/L (ref 5–15)
APPEARANCE UR: CLEAR
AST SERPL-CCNC: 13 U/L (ref 15–37)
BILIRUB SERPL-MCNC: 0.6 MG/DL (ref 0.2–1)
BILIRUB UR QL: NEGATIVE
BUN SERPL-MCNC: 26 MG/DL (ref 6–20)
BUN/CREAT SERPL: 27 (ref 12–20)
CALCIUM SERPL-MCNC: 8.5 MG/DL (ref 8.5–10.1)
CHLORIDE SERPL-SCNC: 106 MMOL/L (ref 97–108)
CO2 SERPL-SCNC: 29 MMOL/L (ref 21–32)
COLOR UR: NORMAL
COMMENT, HOLDF: NORMAL
CREAT SERPL-MCNC: 0.95 MG/DL (ref 0.7–1.3)
ERYTHROCYTE [DISTWIDTH] IN BLOOD BY AUTOMATED COUNT: 13.4 % (ref 11.5–14.5)
GLOBULIN SER CALC-MCNC: 3.9 G/DL (ref 2–4)
GLUCOSE SERPL-MCNC: 112 MG/DL (ref 65–100)
GLUCOSE UR STRIP.AUTO-MCNC: NEGATIVE MG/DL
HCT VFR BLD AUTO: 49.7 % (ref 36.6–50.3)
HGB BLD-MCNC: 16.9 G/DL (ref 12.1–17)
HGB UR QL STRIP: NEGATIVE
KETONES UR QL STRIP.AUTO: NEGATIVE MG/DL
LEUKOCYTE ESTERASE UR QL STRIP.AUTO: NEGATIVE
LIPASE SERPL-CCNC: 187 U/L (ref 73–393)
MAGNESIUM SERPL-MCNC: 2.1 MG/DL (ref 1.6–2.4)
MCH RBC QN AUTO: 30.9 PG (ref 26–34)
MCHC RBC AUTO-ENTMCNC: 34 G/DL (ref 30–36.5)
MCV RBC AUTO: 90.9 FL (ref 80–99)
NITRITE UR QL STRIP.AUTO: NEGATIVE
NRBC # BLD: 0 K/UL (ref 0–0.01)
NRBC BLD-RTO: 0 PER 100 WBC
PH UR STRIP: 5.5 [PH] (ref 5–8)
PLATELET # BLD AUTO: 240 K/UL (ref 150–400)
PMV BLD AUTO: 10.1 FL (ref 8.9–12.9)
POTASSIUM SERPL-SCNC: 3.6 MMOL/L (ref 3.5–5.1)
PROT SERPL-MCNC: 7.7 G/DL (ref 6.4–8.2)
PROT UR STRIP-MCNC: NEGATIVE MG/DL
RBC # BLD AUTO: 5.47 M/UL (ref 4.1–5.7)
SAMPLES BEING HELD,HOLD: NORMAL
SODIUM SERPL-SCNC: 140 MMOL/L (ref 136–145)
SP GR UR REFRACTOMETRY: 1 (ref 1–1.03)
UROBILINOGEN UR QL STRIP.AUTO: 0.2 EU/DL (ref 0.2–1)
WBC # BLD AUTO: 8.4 K/UL (ref 4.1–11.1)

## 2022-12-13 PROCEDURE — 96360 HYDRATION IV INFUSION INIT: CPT

## 2022-12-13 PROCEDURE — 74177 CT ABD & PELVIS W/CONTRAST: CPT

## 2022-12-13 PROCEDURE — 80053 COMPREHEN METABOLIC PANEL: CPT

## 2022-12-13 PROCEDURE — 83735 ASSAY OF MAGNESIUM: CPT

## 2022-12-13 PROCEDURE — 74011000636 HC RX REV CODE- 636: Performed by: EMERGENCY MEDICINE

## 2022-12-13 PROCEDURE — 74011250636 HC RX REV CODE- 250/636: Performed by: EMERGENCY MEDICINE

## 2022-12-13 PROCEDURE — 83690 ASSAY OF LIPASE: CPT

## 2022-12-13 PROCEDURE — 74011250637 HC RX REV CODE- 250/637: Performed by: EMERGENCY MEDICINE

## 2022-12-13 PROCEDURE — 81003 URINALYSIS AUTO W/O SCOPE: CPT

## 2022-12-13 PROCEDURE — 36415 COLL VENOUS BLD VENIPUNCTURE: CPT

## 2022-12-13 PROCEDURE — 99285 EMERGENCY DEPT VISIT HI MDM: CPT

## 2022-12-13 PROCEDURE — 96361 HYDRATE IV INFUSION ADD-ON: CPT

## 2022-12-13 PROCEDURE — 85027 COMPLETE CBC AUTOMATED: CPT

## 2022-12-13 RX ORDER — SODIUM CHLORIDE, SODIUM LACTATE, POTASSIUM CHLORIDE, CALCIUM CHLORIDE 600; 310; 30; 20 MG/100ML; MG/100ML; MG/100ML; MG/100ML
1000 INJECTION, SOLUTION INTRAVENOUS CONTINUOUS
Status: DISCONTINUED | OUTPATIENT
Start: 2022-12-13 | End: 2022-12-13 | Stop reason: HOSPADM

## 2022-12-13 RX ORDER — DIPHENOXYLATE HYDROCHLORIDE AND ATROPINE SULFATE 2.5; .025 MG/1; MG/1
1 TABLET ORAL
Status: COMPLETED | OUTPATIENT
Start: 2022-12-13 | End: 2022-12-13

## 2022-12-13 RX ORDER — DIPHENOXYLATE HYDROCHLORIDE AND ATROPINE SULFATE 2.5; .025 MG/1; MG/1
2 TABLET ORAL
Qty: 20 TABLET | Refills: 0 | Status: SHIPPED | OUTPATIENT
Start: 2022-12-13

## 2022-12-13 RX ORDER — DIPHENOXYLATE HYDROCHLORIDE AND ATROPINE SULFATE 2.5; .025 MG/1; MG/1
2 TABLET ORAL
Status: COMPLETED | OUTPATIENT
Start: 2022-12-13 | End: 2022-12-13

## 2022-12-13 RX ADMIN — DIPHENOXYLATE HYDROCHLORIDE AND ATROPINE SULFATE 2 TABLET: 2.5; .025 TABLET ORAL at 08:13

## 2022-12-13 RX ADMIN — IOPAMIDOL 100 ML: 755 INJECTION, SOLUTION INTRAVENOUS at 09:22

## 2022-12-13 RX ADMIN — DIPHENOXYLATE HYDROCHLORIDE AND ATROPINE SULFATE 1 TABLET: 2.5; .025 TABLET ORAL at 11:48

## 2022-12-13 RX ADMIN — SODIUM CHLORIDE 500 ML: 9 INJECTION, SOLUTION INTRAVENOUS at 10:08

## 2022-12-13 RX ADMIN — SODIUM CHLORIDE, POTASSIUM CHLORIDE, SODIUM LACTATE AND CALCIUM CHLORIDE 1000 ML: 600; 310; 30; 20 INJECTION, SOLUTION INTRAVENOUS at 08:06

## 2022-12-13 NOTE — ED PROVIDER NOTES
EMERGENCY DEPARTMENT HISTORY AND PHYSICAL EXAM      Date: 12/13/2022  Patient Name: Angelica Vazquez    History of Presenting Illness     Chief Complaint   Patient presents with    Diarrhea     Pt arrives to the ED with c/o diarrhea since yesterday without ABD pain, pt reports he was only able to eat soup yesterday, however states he is still having episodes of diarrhea every 20 minutes. Pt reports diarrhea is black, however denies bright red bleeding       History Provided By: Patient    HPI: Angelica Vazquez, 81 y.o. male presents to the ED with cc of diarrhea. Patient states onset of symptoms was 3 days ago. He states yesterday his symptoms are more severe with having bowel movements nearly every 20 minutes. He states its been straight water over the last 24 hours. He states that yesterday he began to note black stool. However he had taken Pepto-Bismol the day before. He denies any fever or chills. He denies any chest pain or shortness of breath. There is been no lightheadedness or dizziness. He states he does have a generalized feeling of fatigue. He states he has been having some mild abdominal cramping in the lower abdomen just prior to having a bowel movement. He denies any urinary symptoms. He states he does have a prior history of bowel resection in the past.  He denies any headache, loss of sensation or loss of strength. There are no other complaints, changes, or physical findings at this time. PCP: eRji Roach MD    No current facility-administered medications on file prior to encounter. Current Outpatient Medications on File Prior to Encounter   Medication Sig Dispense Refill    rosuvastatin (CRESTOR) 5 mg tablet Take  by mouth nightly. losartan (COZAAR) 25 mg tablet Take 25 mg by mouth daily. vit A/vit C/vit E/zinc/copper (PRESERVISION AREDS PO) Take 1 Tablet by mouth daily. multivitamin (ONE A DAY) tablet Take 1 Tab by mouth daily.         omega-3 fatty acids-vitamin e (FISH OIL) 1,000 mg Cap Take 1 Cap by mouth daily. Past History     Past Medical History:  Past Medical History:   Diagnosis Date    CAD (coronary artery disease)     3 stents    COVID         Diverticulosis     Hammer toe 2019    Hammertoe of right foot     Hypertension     Macular degeneration     right eye injections-[facility]    Metatarsalgia, right foot 2019    Sinus bradycardia     Small bowel obstruction ([address]            Past Surgical History:  Past Surgical History:   Procedure Laterality Date    HX GI  age 29    colon procedure    HX HEART CATHETERIZATION  prior to 2005    x3 stents    HX ORTHOPAEDIC Right     second hammertoe repair    HX ORTHOPAEDIC Right 2022    foot surgery     HX OTHER SURGICAL      colonoscopy--polyp    HX SMALL BOWEL RESECTION  2022    GENERAL DIAGNOSTIC LAPAROSCOPY CONVERTED TO OPEN SMALL BOWEL RESECTION    HX TONSILLECTOMY         Family History:  Family History   Problem Relation Age of Onset    Lung Disease Mother     Lung Disease Father     Abdominal aortic aneurysm Father     ALS Brother        Social History:  Social History     Tobacco Use    Smoking status: Former     Packs/day: 1.00     Years: 30.00     Pack years: 30.00     Types: Cigarettes     Quit date: 3/3/2000     Years since quittin.7    Smokeless tobacco: Never   Vaping Use    Vaping Use: Never used   Substance Use Topics    Alcohol use: Not Currently     Comment: none in 15 plus years    Drug use: Never       Allergies:  No Known Allergies      Review of Systems   Review of Systems   Constitutional: Negative. Negative for appetite change, chills, fatigue and fever. HENT: Negative. Negative for congestion, rhinorrhea, sinus pressure and sore throat. Eyes: Negative. Respiratory: Negative. Negative for cough, choking, chest tightness, shortness of breath and wheezing. Cardiovascular: Negative.   Negative for chest pain, palpitations and leg swelling. Gastrointestinal:  Positive for abdominal pain and diarrhea. Negative for constipation, nausea and vomiting. Endocrine: Negative. Genitourinary: Negative. Negative for difficulty urinating, dysuria, flank pain and urgency. Musculoskeletal: Negative. Skin: Negative. Neurological: Negative. Negative for dizziness, speech difficulty, weakness, light-headedness, numbness and headaches. Psychiatric/Behavioral: Negative. All other systems reviewed and are negative. Physical Exam   Physical Exam  Vitals and nursing note reviewed. Constitutional:       General: He is not in acute distress. Appearance: Normal appearance. He is well-developed. He is not diaphoretic. HENT:      Head: Normocephalic and atraumatic. Mouth/Throat:      Mouth: Mucous membranes are dry. Pharynx: No oropharyngeal exudate. Eyes:      General: No scleral icterus. Extraocular Movements: Extraocular movements intact. Conjunctiva/sclera: Conjunctivae normal.      Pupils: Pupils are equal, round, and reactive to light. Neck:      Vascular: No JVD. Trachea: No tracheal deviation. Cardiovascular:      Rate and Rhythm: Normal rate and regular rhythm. Heart sounds: Normal heart sounds. No murmur heard. Pulmonary:      Effort: Pulmonary effort is normal. No respiratory distress. Breath sounds: Normal breath sounds. No stridor. No wheezing or rales. Abdominal:      General: There is no distension. Palpations: Abdomen is soft. Tenderness: There is no abdominal tenderness. There is no guarding or rebound. Musculoskeletal:         General: Normal range of motion. Cervical back: Normal range of motion and neck supple. Right lower leg: No edema. Left lower leg: No edema. Skin:     General: Skin is warm and dry. Capillary Refill: Capillary refill takes less than 2 seconds.    Neurological:      Mental Status: He is alert and oriented to person, place, and time. Cranial Nerves: No cranial nerve deficit. Comments: No gross motor or sensory deficits    Psychiatric:         Mood and Affect: Mood normal.         Behavior: Behavior normal.       Diagnostic Study Results     Labs -     Recent Results (from the past 12 hour(s))   CBC W/O DIFF    Collection Time: 12/13/22  7:54 AM   Result Value Ref Range    WBC 8.4 4.1 - 11.1 K/uL    RBC 5.47 4.10 - 5.70 M/uL    HGB 16.9 12.1 - 17.0 g/dL    HCT 49.7 36.6 - 50.3 %    MCV 90.9 80.0 - 99.0 FL    MCH 30.9 26.0 - 34.0 PG    MCHC 34.0 30.0 - 36.5 g/dL    RDW 13.4 11.5 - 14.5 %    PLATELET 245 510 - 790 K/uL    MPV 10.1 8.9 - 12.9 FL    NRBC 0.0 0  WBC    ABSOLUTE NRBC 0.00 0.00 - 7.84 K/uL   METABOLIC PANEL, COMPREHENSIVE    Collection Time: 12/13/22  7:54 AM   Result Value Ref Range    Sodium 140 136 - 145 mmol/L    Potassium 3.6 3.5 - 5.1 mmol/L    Chloride 106 97 - 108 mmol/L    CO2 29 21 - 32 mmol/L    Anion gap 5 5 - 15 mmol/L    Glucose 112 (H) 65 - 100 mg/dL    BUN 26 (H) 6 - 20 MG/DL    Creatinine 0.95 0.70 - 1.30 MG/DL    BUN/Creatinine ratio 27 (H) 12 - 20      eGFR >60 >60 ml/min/1.73m2    Calcium 8.5 8.5 - 10.1 MG/DL    Bilirubin, total 0.6 0.2 - 1.0 MG/DL    ALT (SGPT) 21 12 - 78 U/L    AST (SGOT) 13 (L) 15 - 37 U/L    Alk. phosphatase 104 45 - 117 U/L    Protein, total 7.7 6.4 - 8.2 g/dL    Albumin 3.8 3.5 - 5.0 g/dL    Globulin 3.9 2.0 - 4.0 g/dL    A-G Ratio 1.0 (L) 1.1 - 2.2     LIPASE    Collection Time: 12/13/22  7:54 AM   Result Value Ref Range    Lipase 187 73 - 393 U/L   SAMPLES BEING HELD    Collection Time: 12/13/22  7:54 AM   Result Value Ref Range    SAMPLES BEING HELD pst,emely     COMMENT        Add-on orders for these samples will be processed based on acceptable specimen integrity and analyte stability, which may vary by analyte.        Radiologic Studies -   No orders to display     CT Results  (Last 48 hours)                 12/13/22 0996  CT ABD PELV W CONT Final result Impression:  1. No acute intra-abdominal pathology. Postsurgical changes in the abdomen. 2.  Cholelithiasis without evidence of acute process. 3.  3.1 cm abdominal aortic aneurysm. Narrative:  EXAM: CT ABD PELV W CONT       INDICATION: lower abd pain, diarrhea       COMPARISON: 3/20/2022        CONTRAST: 100 mL of Isovue-370. TECHNIQUE:    Following the uneventful intravenous administration of contrast, thin axial   images were obtained through the abdomen and pelvis. Coronal and sagittal   reconstructions were generated. Oral contrast was not administered. CT dose   reduction was achieved through use of a standardized protocol tailored for this   examination and automatic exposure control for dose modulation. FINDINGS:    LOWER THORAX: Patchy airspace opacity in the right lung base. Coronary   atherosclerotic disease   LIVER: No mass. BILIARY TREE: Cholelithiasis. CBD is not dilated. SPLEEN: within normal limits. PANCREAS: No mass or ductal dilatation. ADRENALS: Unremarkable. KIDNEYS: Multiple hypodensities in both kidneys. The larger lesions demonstrate   simple fluid density compatible simple cysts requiring no further follow-up. Several lesions are too small to characterize. There is no hydronephrosis. STOMACH: Unremarkable. SMALL BOWEL: Enteroenteric anastomosis in the right lower quadrant with mild   dilatation of the anastomosis likely postsurgical.   COLON: Colonic diverticulosis   APPENDIX: Unremarkable   PERITONEUM: No ascites or pneumoperitoneum. RETROPERITONEUM: 3.1 cm abdominal aortic aneurysm. REPRODUCTIVE ORGANS: Mildly enlarged prostate   URINARY BLADDER: No mass or calculus. BONES: No destructive bone lesion. ABDOMINAL WALL: No mass or hernia. ADDITIONAL COMMENTS: N/A                 CXR Results  (Last 48 hours)      None            Medical Decision Making   I am the first provider for this patient.     I reviewed the vital signs, available nursing notes, past medical history, past surgical history, family history and social history. Vital Signs-Reviewed the patient's vital signs. Patient Vitals for the past 12 hrs:   Temp Pulse Resp BP SpO2   12/13/22 0751 -- -- -- -- 98 %   12/13/22 0737 97.5 °F (36.4 °C) 87 17 (!) 149/82 98 %       Records Reviewed: Nursing Notes, Old Medical Records, Previous Radiology Studies, and Previous Laboratory Studies, patient with prior history of small bowel versus obstruction bowel resection on 3/29/2020    Provider Notes (Medical Decision Making):   DDx- Enteritis, colitis, diverticulitis, electrolyte abnormality, dehydration    ED Course:   Initial assessment performed. The patients presenting problems have been discussed, and they are in agreement with the care plan formulated and outlined with them. I have encouraged them to ask questions as they arise throughout their visit. 0945  Pt feeling better after 1L LR, currently having a BM liquid stool, will order additional 500ml NS, pt had been given Lomotil, waiting to see if this is going to help. Labs reassuring. Patient has only had 1 other small bowel movement. Patient with no history of recent antibiotic use. There is been no fever. Discussed discharge with the patient we will prescribe Lomotil to be used in the next couple days to decrease stool output. Discussed with the patient also the importance of eating as well as probiotic. Disposition:    DC- Adult Discharges: All of the diagnostic tests were reviewed and questions answered. Diagnosis, care plan and treatment options were discussed. The patient understands the instructions and will follow up as directed. The patients results have been reviewed with them. They have been counseled regarding their diagnosis.   The patient verbally convey understanding and agreement of the signs, symptoms, diagnosis, treatment and prognosis and additionally agrees to follow up as recommended with their PCP in 24 - 48 hours. They also agree with the care-plan and convey that all of their questions have been answered. I have also put together some discharge instructions for them that include: 1) educational information regarding their diagnosis, 2) how to care for their diagnosis at home, as well a 3) list of reasons why they would want to return to the ED prior to their follow-up appointment, should their condition change. DISCHARGE PLAN:  1. Discharge Medication List as of 12/13/2022 11:14 AM        START taking these medications    Details   diphenoxylate-atropine (LomotiL) 2.5-0.025 mg per tablet Take 2 Tablets by mouth four (4) times daily as needed for Diarrhea. Max Daily Amount: 8 Tablets., Normal, Disp-20 Tablet, R-0           CONTINUE these medications which have NOT CHANGED    Details   rosuvastatin (CRESTOR) 5 mg tablet Take  by mouth nightly., Historical Med      losartan (COZAAR) 25 mg tablet Take 25 mg by mouth daily. , Historical Med      vit A/vit C/vit E/zinc/copper (PRESERVISION AREDS PO) Take 1 Tablet by mouth daily. , Historical Med      multivitamin (ONE A DAY) tablet Take 1 Tab by mouth daily. Historical Med, 1 Tab      omega-3 fatty acids-vitamin e (FISH OIL) 1,000 mg Cap Take 1 Cap by mouth daily. Historical Med, 1 Cap           2. Follow-up Information    None       3. Return to ED if worse     Diagnosis     Clinical Impression:   1. Diarrhea, unspecified type    2. Dehydration        Attestations:    Tereza Hayes, DO        Please note that this dictation was completed with [employer], the computer voice recognition software. Quite often unanticipated grammatical, syntax, homophones, and other interpretive errors are inadvertently transcribed by the computer software. Please disregard these errors. Please excuse any errors that have escaped final proofreading. Thank you.

## 2022-12-13 NOTE — DISCHARGE INSTRUCTIONS
May want to consider eating items that will bulk up your stool like cheese.  Eating will also help to absorb water and decrease watery stools    May want to consider a pro-biotic, over the counter

## 2023-12-11 ENCOUNTER — APPOINTMENT (OUTPATIENT)
Facility: HOSPITAL | Age: 83
End: 2023-12-11
Payer: MEDICARE

## 2023-12-11 ENCOUNTER — HOSPITAL ENCOUNTER (EMERGENCY)
Facility: HOSPITAL | Age: 83
Discharge: LEFT BEFORE DISCHARGE | End: 2023-12-11
Attending: STUDENT IN AN ORGANIZED HEALTH CARE EDUCATION/TRAINING PROGRAM
Payer: MEDICARE

## 2023-12-11 ENCOUNTER — HOSPITAL ENCOUNTER (EMERGENCY)
Facility: HOSPITAL | Age: 83
Discharge: HOME | End: 2023-12-12
Attending: EMERGENCY MEDICINE
Payer: MEDICARE

## 2023-12-11 VITALS
RESPIRATION RATE: 20 BRPM | SYSTOLIC BLOOD PRESSURE: 154 MMHG | TEMPERATURE: 99.3 F | DIASTOLIC BLOOD PRESSURE: 76 MMHG | OXYGEN SATURATION: 94 % | HEART RATE: 93 BPM | WEIGHT: 183.64 LBS | BODY MASS INDEX: 29.64 KG/M2

## 2023-12-11 DIAGNOSIS — J18.9 ATYPICAL PNEUMONIA: Primary | ICD-10-CM

## 2023-12-11 LAB
ALBUMIN SERPL-MCNC: 3.8 G/DL (ref 3.5–5)
ALBUMIN/GLOB SERPL: 0.9 (ref 1.1–2.2)
ALP SERPL-CCNC: 129 U/L (ref 45–117)
ALT SERPL-CCNC: 21 U/L (ref 12–78)
ANION GAP SERPL CALC-SCNC: 2 MMOL/L (ref 5–15)
ANION GAP SERPL CALC-SCNC: 4 MMOL/L (ref 5–15)
AST SERPL-CCNC: 24 U/L (ref 15–37)
BASOPHILS # BLD: 0.1 K/UL (ref 0–0.1)
BASOPHILS # BLD: 0.1 K/UL (ref 0–0.1)
BASOPHILS NFR BLD: 0 % (ref 0–1)
BASOPHILS NFR BLD: 1 % (ref 0–1)
BILIRUB SERPL-MCNC: 0.7 MG/DL (ref 0.2–1)
BUN SERPL-MCNC: 15 MG/DL (ref 6–20)
BUN SERPL-MCNC: 17 MG/DL (ref 6–20)
BUN/CREAT SERPL: 17 (ref 12–20)
BUN/CREAT SERPL: 22 (ref 12–20)
CALCIUM SERPL-MCNC: 8.4 MG/DL (ref 8.5–10.1)
CALCIUM SERPL-MCNC: 9 MG/DL (ref 8.5–10.1)
CHLORIDE SERPL-SCNC: 103 MMOL/L (ref 97–108)
CHLORIDE SERPL-SCNC: 105 MMOL/L (ref 97–108)
CO2 SERPL-SCNC: 29 MMOL/L (ref 21–32)
CO2 SERPL-SCNC: 29 MMOL/L (ref 21–32)
COMMENT:: NORMAL
CREAT SERPL-MCNC: 0.79 MG/DL (ref 0.7–1.3)
CREAT SERPL-MCNC: 0.86 MG/DL (ref 0.7–1.3)
DIFFERENTIAL METHOD BLD: ABNORMAL
DIFFERENTIAL METHOD BLD: ABNORMAL
EKG ATRIAL RATE: 101 BPM
EKG DIAGNOSIS: NORMAL
EKG P AXIS: 59 DEGREES
EKG P-R INTERVAL: 208 MS
EKG Q-T INTERVAL: 322 MS
EKG QRS DURATION: 98 MS
EKG QTC CALCULATION (BAZETT): 417 MS
EKG R AXIS: -17 DEGREES
EKG T AXIS: 52 DEGREES
EKG VENTRICULAR RATE: 101 BPM
EOSINOPHIL # BLD: 0 K/UL (ref 0–0.4)
EOSINOPHIL # BLD: 0.1 K/UL (ref 0–0.4)
EOSINOPHIL NFR BLD: 0 % (ref 0–7)
EOSINOPHIL NFR BLD: 1 % (ref 0–7)
ERYTHROCYTE [DISTWIDTH] IN BLOOD BY AUTOMATED COUNT: 13.1 % (ref 11.5–14.5)
ERYTHROCYTE [DISTWIDTH] IN BLOOD BY AUTOMATED COUNT: 13.1 % (ref 11.5–14.5)
FLUAV AG NPH QL IA: NEGATIVE
FLUBV AG NOSE QL IA: NEGATIVE
GLOBULIN SER CALC-MCNC: 4.4 G/DL (ref 2–4)
GLUCOSE SERPL-MCNC: 105 MG/DL (ref 65–100)
GLUCOSE SERPL-MCNC: 124 MG/DL (ref 65–100)
HCT VFR BLD AUTO: 45.1 % (ref 36.6–50.3)
HCT VFR BLD AUTO: 47.8 % (ref 36.6–50.3)
HGB BLD-MCNC: 15 G/DL (ref 12.1–17)
HGB BLD-MCNC: 15.3 G/DL (ref 12.1–17)
IMM GRANULOCYTES # BLD AUTO: 0.1 K/UL (ref 0–0.04)
IMM GRANULOCYTES # BLD AUTO: 0.1 K/UL (ref 0–0.04)
IMM GRANULOCYTES NFR BLD AUTO: 0 % (ref 0–0.5)
IMM GRANULOCYTES NFR BLD AUTO: 0 % (ref 0–0.5)
LACTATE SERPL-SCNC: 1.2 MMOL/L (ref 0.4–2)
LYMPHOCYTES # BLD: 0.9 K/UL (ref 0.8–3.5)
LYMPHOCYTES # BLD: 0.9 K/UL (ref 0.8–3.5)
LYMPHOCYTES NFR BLD: 6 % (ref 12–49)
LYMPHOCYTES NFR BLD: 6 % (ref 12–49)
MAGNESIUM SERPL-MCNC: 2.1 MG/DL (ref 1.6–2.4)
MCH RBC QN AUTO: 29.1 PG (ref 26–34)
MCH RBC QN AUTO: 29.1 PG (ref 26–34)
MCHC RBC AUTO-ENTMCNC: 32 G/DL (ref 30–36.5)
MCHC RBC AUTO-ENTMCNC: 33.3 G/DL (ref 30–36.5)
MCV RBC AUTO: 87.4 FL (ref 80–99)
MCV RBC AUTO: 90.9 FL (ref 80–99)
MONOCYTES # BLD: 0.5 K/UL (ref 0–1)
MONOCYTES # BLD: 0.6 K/UL (ref 0–1)
MONOCYTES NFR BLD: 4 % (ref 5–13)
MONOCYTES NFR BLD: 4 % (ref 5–13)
NEUTS SEG # BLD: 11.8 K/UL (ref 1.8–8)
NEUTS SEG # BLD: 12.8 K/UL (ref 1.8–8)
NEUTS SEG NFR BLD: 88 % (ref 32–75)
NEUTS SEG NFR BLD: 90 % (ref 32–75)
NRBC # BLD: 0 K/UL (ref 0–0.01)
NRBC # BLD: 0 K/UL (ref 0–0.01)
NRBC BLD-RTO: 0 PER 100 WBC
NRBC BLD-RTO: 0 PER 100 WBC
NT PRO BNP: 130 PG/ML
NT PRO BNP: 259 PG/ML
PLATELET # BLD AUTO: 197 K/UL (ref 150–400)
PLATELET # BLD AUTO: 201 K/UL (ref 150–400)
PMV BLD AUTO: 10.5 FL (ref 8.9–12.9)
PMV BLD AUTO: 10.5 FL (ref 8.9–12.9)
POTASSIUM SERPL-SCNC: 3.9 MMOL/L (ref 3.5–5.1)
POTASSIUM SERPL-SCNC: 4.6 MMOL/L (ref 3.5–5.1)
PROT SERPL-MCNC: 8.2 G/DL (ref 6.4–8.2)
RBC # BLD AUTO: 5.16 M/UL (ref 4.1–5.7)
RBC # BLD AUTO: 5.26 M/UL (ref 4.1–5.7)
SARS-COV-2 RDRP RESP QL NAA+PROBE: NOT DETECTED
SODIUM SERPL-SCNC: 136 MMOL/L (ref 136–145)
SODIUM SERPL-SCNC: 136 MMOL/L (ref 136–145)
SOURCE: NORMAL
SPECIMEN HOLD: NORMAL
TROPONIN I SERPL HS-MCNC: 11 NG/L (ref 0–76)
TROPONIN I SERPL HS-MCNC: 18 NG/L (ref 0–76)
WBC # BLD AUTO: 13.4 K/UL (ref 4.1–11.1)
WBC # BLD AUTO: 14.3 K/UL (ref 4.1–11.1)

## 2023-12-11 PROCEDURE — 93005 ELECTROCARDIOGRAM TRACING: CPT | Performed by: STUDENT IN AN ORGANIZED HEALTH CARE EDUCATION/TRAINING PROGRAM

## 2023-12-11 PROCEDURE — 83880 ASSAY OF NATRIURETIC PEPTIDE: CPT

## 2023-12-11 PROCEDURE — 36415 COLL VENOUS BLD VENIPUNCTURE: CPT

## 2023-12-11 PROCEDURE — 84484 ASSAY OF TROPONIN QUANT: CPT

## 2023-12-11 PROCEDURE — 99285 EMERGENCY DEPT VISIT HI MDM: CPT

## 2023-12-11 PROCEDURE — 83735 ASSAY OF MAGNESIUM: CPT

## 2023-12-11 PROCEDURE — 87040 BLOOD CULTURE FOR BACTERIA: CPT

## 2023-12-11 PROCEDURE — 85025 COMPLETE CBC W/AUTO DIFF WBC: CPT

## 2023-12-11 PROCEDURE — 87804 INFLUENZA ASSAY W/OPTIC: CPT

## 2023-12-11 PROCEDURE — 87635 SARS-COV-2 COVID-19 AMP PRB: CPT

## 2023-12-11 PROCEDURE — 80048 BASIC METABOLIC PNL TOTAL CA: CPT

## 2023-12-11 PROCEDURE — 93005 ELECTROCARDIOGRAM TRACING: CPT | Performed by: EMERGENCY MEDICINE

## 2023-12-11 PROCEDURE — 4500000002 HC ER NO CHARGE

## 2023-12-11 PROCEDURE — 80053 COMPREHEN METABOLIC PANEL: CPT

## 2023-12-11 PROCEDURE — 83605 ASSAY OF LACTIC ACID: CPT

## 2023-12-11 PROCEDURE — 71046 X-RAY EXAM CHEST 2 VIEWS: CPT

## 2023-12-11 ASSESSMENT — PAIN - FUNCTIONAL ASSESSMENT
PAIN_FUNCTIONAL_ASSESSMENT: NONE - DENIES PAIN
PAIN_FUNCTIONAL_ASSESSMENT: 0-10

## 2023-12-11 ASSESSMENT — PAIN SCALES - GENERAL: PAINLEVEL_OUTOF10: 4

## 2023-12-12 VITALS
SYSTOLIC BLOOD PRESSURE: 143 MMHG | WEIGHT: 184.08 LBS | BODY MASS INDEX: 29.71 KG/M2 | HEART RATE: 85 BPM | DIASTOLIC BLOOD PRESSURE: 82 MMHG | OXYGEN SATURATION: 94 % | TEMPERATURE: 98.9 F | RESPIRATION RATE: 18 BRPM

## 2023-12-12 LAB
EKG ATRIAL RATE: 99 BPM
EKG DIAGNOSIS: NORMAL
EKG P AXIS: 45 DEGREES
EKG P-R INTERVAL: 166 MS
EKG Q-T INTERVAL: 338 MS
EKG QRS DURATION: 98 MS
EKG QTC CALCULATION (BAZETT): 433 MS
EKG R AXIS: -1 DEGREES
EKG T AXIS: 76 DEGREES
EKG VENTRICULAR RATE: 99 BPM

## 2023-12-12 PROCEDURE — 6370000000 HC RX 637 (ALT 250 FOR IP): Performed by: EMERGENCY MEDICINE

## 2023-12-12 RX ORDER — AZITHROMYCIN 250 MG/1
500 TABLET, FILM COATED ORAL
Status: COMPLETED | OUTPATIENT
Start: 2023-12-12 | End: 2023-12-12

## 2023-12-12 RX ORDER — AZITHROMYCIN 250 MG/1
TABLET, FILM COATED ORAL
Qty: 1 PACKET | Refills: 0 | Status: SHIPPED | OUTPATIENT
Start: 2023-12-12 | End: 2023-12-16

## 2023-12-12 RX ADMIN — AZITHROMYCIN DIHYDRATE 500 MG: 250 TABLET, FILM COATED ORAL at 00:33

## 2023-12-12 NOTE — ED NOTES
Patient discharged by Noe Vazquez. MD - pt sent to the front lobby, with strong and steady gait, no acute distress noted at time of discharge - Discharge information / home RX / and reasons to return to the ED were reviewed by the ED provider.         Marycruz Rodriguez RN  12/12/23 0044

## 2023-12-12 NOTE — ED PROVIDER NOTES
[facility] EMERGENCY DEP  EMERGENCY DEPARTMENT ENCOUNTER      Pt Name: Margarette Roberts  MRN: [id number]  [address] [place] 1940  Date of evaluation: 12/11/2023  Provider: Krista Arteaga MD      HISTORY OF PRESENT ILLNESS      Pleasant 81-year-old male with history of coronary disease presents to the emergency department this time with concern for cough for about a day. He recently came home from a cruise. The history is provided by the patient, medical records and a relative. Nursing Notes were reviewed. REVIEW OF SYSTEMS         Review of Systems        PAST MEDICAL HISTORY     Past Medical History:   Diagnosis Date    CAD (coronary artery disease)     3 stents    COVID     02/22    Diverticulosis     Hammer toe 7/18/2019    Hammertoe of right foot     Hypertension     Macular degeneration     [facility]    Metatarsalgia, right foot 7/18/2019    Sinus bradycardia     Small bowel obstruction ([address])     02/22         SURGICAL HISTORY       Past Surgical History:   Procedure Laterality Date    CARDIAC CATHETERIZATION  prior to 2005    x3 stents    GI  age 29    colon procedure    ORTHOPEDIC SURGERY Right 2015    second hammertoe repair    ORTHOPEDIC SURGERY Right 03/2022    foot surgery     OTHER SURGICAL HISTORY      colonoscopy--polyp    SMALL INTESTINE SURGERY  03/29/2022    GENERAL DIAGNOSTIC LAPAROSCOPY CONVERTED TO OPEN SMALL BOWEL RESECTION    TONSILLECTOMY           CURRENT MEDICATIONS       Previous Medications    DIPHENOXYLATE-ATROPINE (LOMOTIL) 2.5-0.025 MG PER TABLET    Take 2 tablets by mouth 4 times daily as needed. LOSARTAN (COZAAR) 25 MG TABLET    Take 25 mg by mouth daily    ROSUVASTATIN (CRESTOR) 5 MG TABLET    Take by mouth       ALLERGIES     Patient has no known allergies.     FAMILY HISTORY       Family History   Problem Relation Age of Onset    Abdominal aortic aneurysm Father     Lung Disease Father     Lung Disease Mother           SOCIAL HISTORY

## 2023-12-16 LAB
BACTERIA SPEC CULT: NORMAL
SERVICE CMNT-IMP: NORMAL

## 2024-04-20 NOTE — ANESTHESIA POSTPROCEDURE EVALUATION
Procedure(s):  SHORTNENING OSTEOTOMY RIGHT THIRD METATARSAL WITH REMOVAL OF PREVIOUS SCREW.    general    Anesthesia Post Evaluation        Patient location during evaluation: PACU  Note status: Adequate. Level of consciousness: responsive to verbal stimuli and sleepy but conscious  Pain management: satisfactory to patient  Airway patency: patent  Anesthetic complications: no  Cardiovascular status: acceptable  Respiratory status: acceptable  Hydration status: acceptable  Comments: +Post-Anesthesia Evaluation and Assessment    Patient: Caren Pena MRN: [id number]  SSN: [id number]   YOB: 1940  Age: [de-identified] y.o. Sex: male      Cardiovascular Function/Vital Signs    BP (!) 114/44 (BP 1 Location: Right upper arm, BP Patient Position: At rest)   Pulse 63   Temp 36.4 °C (97.5 °F)   Resp 13   Ht 5' 6\" (1.676 m)   Wt 82.2 kg (181 lb 3.5 oz)   SpO2 93%   BMI 29.25 kg/m²     Patient is status post Procedure(s):  SHORTNENING OSTEOTOMY RIGHT THIRD METATARSAL WITH REMOVAL OF PREVIOUS SCREW. Nausea/Vomiting: Controlled. Postoperative hydration reviewed and adequate. Pain:  Pain Scale 1: Numeric (0 - 10) (03/10/22 1530)  Pain Intensity 1: 0 (03/10/22 1530)   Managed. Neurological Status:   Neuro (WDL): Exceptions to WDL (03/10/22 1427)   At baseline. Mental Status and Level of Consciousness: Arousable. Pulmonary Status:   O2 Device: None (Room air) (03/10/22 1530)   Adequate oxygenation and airway patent. Complications related to anesthesia: None    Post-anesthesia assessment completed. No concerns. Signed By: Brennan Maher MD    3/10/2022  Post anesthesia nausea and vomiting:  controlled      INITIAL Post-op Vital signs:   Vitals Value Taken Time   /44 03/10/22 1530   Temp 36.4 °C (97.5 °F) 03/10/22 1429   Pulse 60 03/10/22 1542   Resp 18 03/10/22 1542   SpO2 91 % 03/10/22 1542   Vitals shown include unvalidated device data. (1) weak, irregular

## 2024-09-19 NOTE — CONSULTS
Pt seen and examined. Full consult dictated. He appears to have had transient AIVR post op. Would watch on telemetry. Nothing on baseline EKG to suggest MI. Will follow. Admission

## 2025-04-28 PROBLEM — M19.041 ARTHRITIS OF RIGHT HAND: Status: ACTIVE | Noted: 2025-04-28

## 2025-05-01 NOTE — ANESTHESIA PREPROCEDURE EVALUATION
Relevant Problems   No relevant active problems       Anesthetic History   No history of anesthetic complications            Review of Systems / Medical History  Patient summary reviewed, nursing notes reviewed and pertinent labs reviewed    Pulmonary          Smoker      Comments: Former smoker.  Quit 2000. 30 pack year hx   Neuro/Psych   Within defined limits           Cardiovascular    Hypertension        Dysrhythmias : PVC  Past MI, CAD and hyperlipidemia    Exercise tolerance: >4 METS  Comments: EKG (02/2022): NSR, PVCs  Coronary stent x 3  Hx bradycardia   Anteroseptal infarct   GI/Hepatic/Renal               Comments: Diverticulosis   SBO (small bowel obstruction)  Endo/Other        Obesity     Other Findings   Comments: Macular degeneration            Physical Exam    Airway  Mallampati: II  TM Distance: 4 - 6 cm  Neck ROM: normal range of motion   Mouth opening: Normal     Cardiovascular  Regular rate and rhythm,  S1 and S2 normal,  no murmur, click, rub, or gallop             Dental    Dentition: Caps/crowns     Pulmonary  Breath sounds clear to auscultation               Abdominal  GI exam deferred       Other Findings            Anesthetic Plan    ASA: 3  Anesthesia type: general    Monitoring Plan: BIS      Induction: Intravenous  Anesthetic plan and risks discussed with: Patient VS: Pulse 63   Temp 97.9  F (36.6  C) (Oral)   Resp 18   Wt (!) 144.2 kg (317 lb 14.4 oz)   SpO2 95%   BMI 46.95 kg/m      O2: 95% on RA   Output: Continent bowel and bladder   Last BM: 4/30/25   Activity: SBA   Skin: R Driveline site, R chest wound vac, L DL PICC line, L knee scab   Pain: Denies   CMS:  Neuro: Alert and oriented x4, able to make needs known.    Dressing: WOC changed wound vac and driveline dressing today.    Diet: Regular diet, thin liquids, takes meds whole   LDA: LVAD HM3, wound vac, L DL PICC   Equipment: LVAD monitor, wound vac, call light   Plan: Con't POC.    Additional Info: Patient was calm and cooperative with all cares, denies SOB and CP. MAP was 80 this shift. Numbers WNL. Wound vac running at 125 mmHg. Patient went down for abdominal ultrasound today. No acute issues this shift, continue POC.    Goal Outcome Evaluation:      Plan of Care Reviewed With: patient    Overall Patient Progress: no changeOverall Patient Progress: no change         Patient's most recent vital signs are:     Vital signs:  BP: [MAP 80[  Temp: 97.9  HR: 63  RR: 18  SpO2: 95 %     Patient does not have new respiratory symptoms.  Patient does not have new sore throat.  Patient does not have a fever greater than 99.5.

## 2025-05-12 NOTE — PERIOP NOTE
[facility]  Ambulatory Surgery Unit  Pre-operative Instructions    Surgery/Procedure Date  Wednesday- 5/14/2025            Tentative Arrival Time TBD      1. On the day of your surgery/procedure, please report to the Ambulatory Surgery Unit Registration Desk and sign in at your designated time. The Ambulatory Surgery Unit is located in [facility] on the [facility] the [facility] across from the [facility]. Please have all of your health insurance cards, co-payment, and a photo ID.    **TWO adults may accompany you the day of the procedure.  We have limited seating available.      2. You cannot be dropped off for surgery.  Please make arrangements for a responsible adult friend or family member to remain on the hospital campus during your procedure, and drive you home, as you should not drive for 24 hours following anesthesia. Make arrangements for a responsible adult to stay with you for at least the first 24 hours after your surgery.    3. Do not have anything to eat or drink (including water, gum, mints, coffee, juice) after 11:59 PM  Tuesday- 5/13/2025. This may not apply to medications prescribed by your physician.  (Please note below the special instructions with medications to take the morning of surgery, if applicable.)    4. We recommend you do not drink any alcoholic beverages for 24 hours before and after your surgery.    5. Contact your surgeon’s office for instructions on the following medications: non-steroidal anti-inflammatory drugs (i.e. Advil, Aleve), vitamins, and supplements. (Some surgeon’s will want you to stop these medications prior to surgery and others may allow you to take them)   **If you are currently taking Plavix, Coumadin, Aspirin and/or other blood-thinning agents, contact your surgeon for instructions.** Your surgeon will partner with the physician prescribing these medications to determine if it is safe to stop or if you need to continue

## 2025-05-12 NOTE — H&P
HPI: Clinton Grewal (: 1940) is a 84 y.o. male, patient, here for evaluation of the following chief complaint(s):     Hand Pain (Right more than left hand joint pain, swelling, painful to touch without injury, fall starting months ago. 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 Denies numbness, tingling. Right hand dominant. )  Patient is seen today to evaluate his hands.  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  He is not unable to  comfortably a golf club and play any longer.  He is right-hand dominant.  He denied any numbness or tingling or any injury or fall.  He complains mostly of decreased  and difficulty making a fist.     Vitals:  Ht 1.676 m (5' 6\")   Wt 81.6 kg (180 lb)   BMI 29.05 kg/m²     Body mass index is 29.05 kg/m².     Allergies   No Known Allergies        Current Facility-Administered Medications          Current Outpatient Medications   Medication Sig Dispense Refill    Multiple Vitamin (MULTIVITAMINS PO) Take by mouth        methylPREDNISolone (MEDROL DOSEPACK) 4 MG tablet Take by mouth. 1 kit 0    diphenoxylate-atropine (LOMOTIL) 2.5-0.025 MG per tablet Take 2 tablets by mouth 4 times daily as needed.        losartan (COZAAR) 25 MG tablet Take 1 tablet by mouth daily        rosuvastatin (CRESTOR) 5 MG tablet Take by mouth          No current facility-administered medications for this visit.            Past Medical History        Past Medical History:   Diagnosis Date    CAD (coronary artery disease)       3 stents    COVID           Diverticulosis      Hammer toe 2019    Hammertoe of right foot      Hypertension      Macular degeneration       right eye injections-[facility]    Metatarsalgia, right foot 2019    Sinus bradycardia      Small bowel obstruction (HCC)                   Past Surgical History         Past Surgical History:   Procedure Laterality Date    CARDIAC CATHETERIZATION   prior to 2005     x3

## 2025-05-13 ENCOUNTER — ANESTHESIA EVENT (OUTPATIENT)
Facility: HOSPITAL | Age: 85
End: 2025-05-13
Payer: MEDICARE

## 2025-05-14 ENCOUNTER — ANESTHESIA (OUTPATIENT)
Facility: HOSPITAL | Age: 85
End: 2025-05-14
Payer: MEDICARE

## 2025-05-14 ENCOUNTER — HOSPITAL ENCOUNTER (OUTPATIENT)
Facility: HOSPITAL | Age: 85
Setting detail: OUTPATIENT SURGERY
Discharge: HOME | End: 2025-05-14
Attending: ORTHOPAEDIC SURGERY | Admitting: ORTHOPAEDIC SURGERY
Payer: MEDICARE

## 2025-05-14 VITALS
OXYGEN SATURATION: 94 % | BODY MASS INDEX: 28.93 KG/M2 | DIASTOLIC BLOOD PRESSURE: 52 MMHG | HEART RATE: 65 BPM | TEMPERATURE: 97.3 F | SYSTOLIC BLOOD PRESSURE: 116 MMHG | RESPIRATION RATE: 19 BRPM | HEIGHT: 66 IN | WEIGHT: 180 LBS

## 2025-05-14 PROCEDURE — C1776 JOINT DEVICE (IMPLANTABLE): HCPCS | Performed by: ORTHOPAEDIC SURGERY

## 2025-05-14 PROCEDURE — 7100000000 HC PACU RECOVERY - FIRST 15 MIN: Performed by: ORTHOPAEDIC SURGERY

## 2025-05-14 PROCEDURE — 2709999900 HC NON-CHARGEABLE SUPPLY: Performed by: ORTHOPAEDIC SURGERY

## 2025-05-14 PROCEDURE — 2500000003 HC RX 250 WO HCPCS

## 2025-05-14 PROCEDURE — 6360000002 HC RX W HCPCS: Performed by: STUDENT IN AN ORGANIZED HEALTH CARE EDUCATION/TRAINING PROGRAM

## 2025-05-14 PROCEDURE — 2580000003 HC RX 258

## 2025-05-14 PROCEDURE — 6360000002 HC RX W HCPCS: Performed by: ORTHOPAEDIC SURGERY

## 2025-05-14 PROCEDURE — 7100000010 HC PHASE II RECOVERY - FIRST 15 MIN: Performed by: ORTHOPAEDIC SURGERY

## 2025-05-14 PROCEDURE — 3600000014 HC SURGERY LEVEL 4 ADDTL 15MIN: Performed by: ORTHOPAEDIC SURGERY

## 2025-05-14 PROCEDURE — 3700000000 HC ANESTHESIA ATTENDED CARE: Performed by: ORTHOPAEDIC SURGERY

## 2025-05-14 PROCEDURE — 2500000003 HC RX 250 WO HCPCS: Performed by: ORTHOPAEDIC SURGERY

## 2025-05-14 PROCEDURE — 6360000002 HC RX W HCPCS

## 2025-05-14 PROCEDURE — 7100000001 HC PACU RECOVERY - ADDTL 15 MIN: Performed by: ORTHOPAEDIC SURGERY

## 2025-05-14 PROCEDURE — 64415 NJX AA&/STRD BRCH PLXS IMG: CPT | Performed by: ANESTHESIOLOGY

## 2025-05-14 PROCEDURE — 3600000004 HC SURGERY LEVEL 4 BASE: Performed by: ORTHOPAEDIC SURGERY

## 2025-05-14 PROCEDURE — 7100000011 HC PHASE II RECOVERY - ADDTL 15 MIN: Performed by: ORTHOPAEDIC SURGERY

## 2025-05-14 PROCEDURE — 3700000001 HC ADD 15 MINUTES (ANESTHESIA): Performed by: ORTHOPAEDIC SURGERY

## 2025-05-14 DEVICE — FLEXSPAN FINGER JOINT IMPLANT W/GROMMETS
Type: IMPLANTABLE DEVICE | Site: MIDDLE FINGER | Status: FUNCTIONAL
Brand: SWANSON

## 2025-05-14 DEVICE — FLEXSPAN FINGER JOINT IMPLANT W/GROMMETS
Type: IMPLANTABLE DEVICE | Site: INDEX FINGER | Status: FUNCTIONAL
Brand: SWANSON

## 2025-05-14 RX ORDER — FENTANYL CITRATE 50 UG/ML
25 INJECTION, SOLUTION INTRAMUSCULAR; INTRAVENOUS EVERY 5 MIN PRN
Status: DISCONTINUED | OUTPATIENT
Start: 2025-05-14 | End: 2025-05-14 | Stop reason: HOSPADM

## 2025-05-14 RX ORDER — WATER 10 ML/10ML
INJECTION INTRAMUSCULAR; INTRAVENOUS; SUBCUTANEOUS
Status: DISCONTINUED
Start: 2025-05-14 | End: 2025-05-14 | Stop reason: HOSPADM

## 2025-05-14 RX ORDER — LIDOCAINE HYDROCHLORIDE 20 MG/ML
INJECTION, SOLUTION EPIDURAL; INFILTRATION; INTRACAUDAL; PERINEURAL
Status: DISCONTINUED | OUTPATIENT
Start: 2025-05-14 | End: 2025-05-14 | Stop reason: SDUPTHER

## 2025-05-14 RX ORDER — FENTANYL CITRATE 50 UG/ML
INJECTION, SOLUTION INTRAMUSCULAR; INTRAVENOUS
Status: DISCONTINUED | OUTPATIENT
Start: 2025-05-14 | End: 2025-05-14 | Stop reason: SDUPTHER

## 2025-05-14 RX ORDER — ROPIVACAINE HYDROCHLORIDE 5 MG/ML
INJECTION, SOLUTION EPIDURAL; INFILTRATION; PERINEURAL
Status: DISCONTINUED | OUTPATIENT
Start: 2025-05-14 | End: 2025-05-14 | Stop reason: SDUPTHER

## 2025-05-14 RX ORDER — ONDANSETRON 2 MG/ML
4 INJECTION INTRAMUSCULAR; INTRAVENOUS ONCE
Status: DISCONTINUED | OUTPATIENT
Start: 2025-05-14 | End: 2025-05-14 | Stop reason: HOSPADM

## 2025-05-14 RX ORDER — ACETAMINOPHEN 325 MG/1
650 TABLET ORAL
Status: DISCONTINUED | OUTPATIENT
Start: 2025-05-14 | End: 2025-05-14 | Stop reason: HOSPADM

## 2025-05-14 RX ORDER — SODIUM CHLORIDE, SODIUM LACTATE, POTASSIUM CHLORIDE, CALCIUM CHLORIDE 600; 310; 30; 20 MG/100ML; MG/100ML; MG/100ML; MG/100ML
INJECTION, SOLUTION INTRAVENOUS
Status: DISCONTINUED | OUTPATIENT
Start: 2025-05-14 | End: 2025-05-14 | Stop reason: SDUPTHER

## 2025-05-14 RX ORDER — SODIUM CHLORIDE, SODIUM LACTATE, POTASSIUM CHLORIDE, CALCIUM CHLORIDE 600; 310; 30; 20 MG/100ML; MG/100ML; MG/100ML; MG/100ML
INJECTION, SOLUTION INTRAVENOUS CONTINUOUS
Status: DISCONTINUED | OUTPATIENT
Start: 2025-05-14 | End: 2025-05-14 | Stop reason: HOSPADM

## 2025-05-14 RX ORDER — MAGNESIUM HYDROXIDE 1200 MG/15ML
LIQUID ORAL CONTINUOUS PRN
Status: COMPLETED | OUTPATIENT
Start: 2025-05-14 | End: 2025-05-14

## 2025-05-14 RX ORDER — ACETAMINOPHEN 500 MG
1000 TABLET ORAL ONCE
Status: DISCONTINUED | OUTPATIENT
Start: 2025-05-14 | End: 2025-05-14 | Stop reason: HOSPADM

## 2025-05-14 RX ORDER — HYDROMORPHONE HYDROCHLORIDE 1 MG/ML
0.25 INJECTION, SOLUTION INTRAMUSCULAR; INTRAVENOUS; SUBCUTANEOUS EVERY 5 MIN PRN
Status: DISCONTINUED | OUTPATIENT
Start: 2025-05-14 | End: 2025-05-14 | Stop reason: HOSPADM

## 2025-05-14 RX ORDER — SODIUM CHLORIDE 9 MG/ML
INJECTION, SOLUTION INTRAVENOUS PRN
Status: DISCONTINUED | OUTPATIENT
Start: 2025-05-14 | End: 2025-05-14 | Stop reason: HOSPADM

## 2025-05-14 RX ORDER — MIDAZOLAM HYDROCHLORIDE 1 MG/ML
INJECTION, SOLUTION INTRAMUSCULAR; INTRAVENOUS
Status: COMPLETED
Start: 2025-05-14 | End: 2025-05-14

## 2025-05-14 RX ORDER — CEFAZOLIN SODIUM 1 G/3ML
INJECTION, POWDER, FOR SOLUTION INTRAMUSCULAR; INTRAVENOUS
Status: DISCONTINUED
Start: 2025-05-14 | End: 2025-05-14 | Stop reason: HOSPADM

## 2025-05-14 RX ORDER — SODIUM CHLORIDE 0.9 % (FLUSH) 0.9 %
5-40 SYRINGE (ML) INJECTION PRN
Status: DISCONTINUED | OUTPATIENT
Start: 2025-05-14 | End: 2025-05-14 | Stop reason: HOSPADM

## 2025-05-14 RX ORDER — MIDAZOLAM HYDROCHLORIDE 2 MG/2ML
2 INJECTION, SOLUTION INTRAMUSCULAR; INTRAVENOUS
Status: DISCONTINUED | OUTPATIENT
Start: 2025-05-14 | End: 2025-05-14 | Stop reason: HOSPADM

## 2025-05-14 RX ORDER — OXYCODONE HYDROCHLORIDE 5 MG/1
5 TABLET ORAL
Status: DISCONTINUED | OUTPATIENT
Start: 2025-05-14 | End: 2025-05-14 | Stop reason: HOSPADM

## 2025-05-14 RX ORDER — HYDRALAZINE HYDROCHLORIDE 20 MG/ML
10 INJECTION INTRAMUSCULAR; INTRAVENOUS
Status: DISCONTINUED | OUTPATIENT
Start: 2025-05-14 | End: 2025-05-14 | Stop reason: HOSPADM

## 2025-05-14 RX ORDER — EPHEDRINE SULFATE/0.9% NACL/PF 25 MG/5 ML
SYRINGE (ML) INTRAVENOUS
Status: DISCONTINUED | OUTPATIENT
Start: 2025-05-14 | End: 2025-05-14 | Stop reason: SDUPTHER

## 2025-05-14 RX ORDER — NALOXONE HYDROCHLORIDE 0.4 MG/ML
INJECTION, SOLUTION INTRAMUSCULAR; INTRAVENOUS; SUBCUTANEOUS PRN
Status: DISCONTINUED | OUTPATIENT
Start: 2025-05-14 | End: 2025-05-14 | Stop reason: HOSPADM

## 2025-05-14 RX ORDER — ONDANSETRON 2 MG/ML
INJECTION INTRAMUSCULAR; INTRAVENOUS
Status: DISCONTINUED | OUTPATIENT
Start: 2025-05-14 | End: 2025-05-14 | Stop reason: SDUPTHER

## 2025-05-14 RX ORDER — SODIUM CHLORIDE 0.9 % (FLUSH) 0.9 %
5-40 SYRINGE (ML) INJECTION EVERY 12 HOURS SCHEDULED
Status: DISCONTINUED | OUTPATIENT
Start: 2025-05-14 | End: 2025-05-14 | Stop reason: HOSPADM

## 2025-05-14 RX ORDER — MIDAZOLAM HYDROCHLORIDE 1 MG/ML
INJECTION, SOLUTION INTRAMUSCULAR; INTRAVENOUS
Status: DISCONTINUED | OUTPATIENT
Start: 2025-05-14 | End: 2025-05-14 | Stop reason: SDUPTHER

## 2025-05-14 RX ORDER — PROCHLORPERAZINE EDISYLATE 5 MG/ML
5 INJECTION INTRAMUSCULAR; INTRAVENOUS
Status: DISCONTINUED | OUTPATIENT
Start: 2025-05-14 | End: 2025-05-14 | Stop reason: HOSPADM

## 2025-05-14 RX ORDER — FENTANYL CITRATE 50 UG/ML
100 INJECTION, SOLUTION INTRAMUSCULAR; INTRAVENOUS
Status: DISCONTINUED | OUTPATIENT
Start: 2025-05-14 | End: 2025-05-14 | Stop reason: HOSPADM

## 2025-05-14 RX ORDER — DEXAMETHASONE SODIUM PHOSPHATE 4 MG/ML
4 INJECTION, SOLUTION INTRA-ARTICULAR; INTRALESIONAL; INTRAMUSCULAR; INTRAVENOUS; SOFT TISSUE
Status: DISCONTINUED | OUTPATIENT
Start: 2025-05-14 | End: 2025-05-14 | Stop reason: HOSPADM

## 2025-05-14 RX ORDER — ROPIVACAINE HYDROCHLORIDE 5 MG/ML
INJECTION, SOLUTION EPIDURAL; INFILTRATION; PERINEURAL
Status: COMPLETED
Start: 2025-05-14 | End: 2025-05-14

## 2025-05-14 RX ADMIN — PROPOFOL 125 MCG/KG/MIN: 10 INJECTION, EMULSION INTRAVENOUS at 12:47

## 2025-05-14 RX ADMIN — LIDOCAINE HYDROCHLORIDE 50 MG: 20 INJECTION, SOLUTION EPIDURAL; INFILTRATION; INTRACAUDAL; PERINEURAL at 12:46

## 2025-05-14 RX ADMIN — FENTANYL CITRATE 25 MCG: 50 INJECTION, SOLUTION INTRAMUSCULAR; INTRAVENOUS at 13:42

## 2025-05-14 RX ADMIN — WATER 2000 MG: 1 INJECTION INTRAMUSCULAR; INTRAVENOUS; SUBCUTANEOUS at 12:51

## 2025-05-14 RX ADMIN — FENTANYL CITRATE 25 MCG: 50 INJECTION, SOLUTION INTRAMUSCULAR; INTRAVENOUS at 13:04

## 2025-05-14 RX ADMIN — ROPIVACAINE HYDROCHLORIDE 25 ML: 5 INJECTION, SOLUTION EPIDURAL; INFILTRATION; PERINEURAL at 12:30

## 2025-05-14 RX ADMIN — EPHEDRINE SULFATE 5 MG: 5 INJECTION INTRAVENOUS at 12:54

## 2025-05-14 RX ADMIN — MIDAZOLAM HYDROCHLORIDE 2 MG: 1 INJECTION, SOLUTION INTRAMUSCULAR; INTRAVENOUS at 12:30

## 2025-05-14 RX ADMIN — EPHEDRINE SULFATE 5 MG: 5 INJECTION INTRAVENOUS at 13:13

## 2025-05-14 RX ADMIN — ONDANSETRON HYDROCHLORIDE 4 MG: 2 INJECTION, SOLUTION INTRAMUSCULAR; INTRAVENOUS at 12:55

## 2025-05-14 RX ADMIN — EPHEDRINE SULFATE 5 MG: 5 INJECTION INTRAVENOUS at 13:42

## 2025-05-14 RX ADMIN — FENTANYL CITRATE 25 MCG: 50 INJECTION, SOLUTION INTRAMUSCULAR; INTRAVENOUS at 14:17

## 2025-05-14 RX ADMIN — PROPOFOL 50 MG: 10 INJECTION, EMULSION INTRAVENOUS at 12:46

## 2025-05-14 RX ADMIN — EPHEDRINE SULFATE 5 MG: 5 INJECTION INTRAVENOUS at 13:33

## 2025-05-14 RX ADMIN — SODIUM CHLORIDE, POTASSIUM CHLORIDE, SODIUM LACTATE AND CALCIUM CHLORIDE: 600; 310; 30; 20 INJECTION, SOLUTION INTRAVENOUS at 12:39

## 2025-05-14 RX ADMIN — FENTANYL CITRATE 25 MCG: 50 INJECTION, SOLUTION INTRAMUSCULAR; INTRAVENOUS at 14:31

## 2025-05-14 ASSESSMENT — PAIN DESCRIPTION - DESCRIPTORS: DESCRIPTORS: ACHING

## 2025-05-14 ASSESSMENT — PAIN - FUNCTIONAL ASSESSMENT: PAIN_FUNCTIONAL_ASSESSMENT: 0-10

## 2025-05-14 NOTE — PERIOP NOTE
Dr. Doran performed nerve block in preop using ultrasound machine to RUE. Pt on CM x3, 02 by NC at 3L, patient responsive when spoken to. Able to answer questions appropriately. Pt did receive 2mg Versed given by Dr. Doran for sedation. Pt tolerated procedure well. VSS and will continue to monitor

## 2025-05-14 NOTE — PERIOP NOTE
Pt. States ready for discharge.  Vss.  Denies pain.  Dressing to right arm intact.  Pt. SB on monitor w/some episodes of PAT, asymptomatic.  Vss. Discussed w/Dr. Echeverria.  Instructed to follow up w/cardiology outpatient.  Discharge instructions reviewed w/pt and friend.  They verbalized understanding.  Instructed pt and wife to call Dr. Butt tomorrow to schedule follow up regarding hr.  They verbalized understanding.  Sling placed to right arm.  Pt discharged via wheelchair to car, accompanied by RN.  Pt discharged awake and alert, respirations equal and unlabored, skin warm, dry, and intact.  Pt and family members' questions and concerns addressed prior to discharge.

## 2025-05-14 NOTE — PERIOP NOTE
Clinton Grewal  1940  [id number]    Situation:  Verbal report given from: ABE Quiros RN  Procedure: Procedure(s):  RIGHT INDEX AND  MIDDLE FINGER METACARPAL PHALANGEAL JOINT ARTHROPLASTY WITH RIGHT MIDDLE FINGER PROXIMAL INTERPHALANGEAL JOINT ARTHROPLASTY    Background:    Preoperative diagnosis: Arthritis of right hand [M19.041]    Postoperative diagnosis: * No post-op diagnosis entered *    :  Dr. Nelson    Assistant(s): Circulator: Liliane Dye RN  Relief Circulator: Destiney Shen RN  Scrub Person First: Karson Sandoval    Specimens: * No specimens in log *    Assessment:  Intra-procedure medications         Anesthesia gave intra-procedure sedation and medications, see anesthesia flow sheet     Intravenous fluids: LR@ KVO     Vital signs stable       Recommendation:    Permission to share finding with friend

## 2025-05-14 NOTE — ANESTHESIA PROCEDURE NOTES
Peripheral Block    Patient location during procedure: holding area  Reason for block: post-op pain management, primary anesthetic and at surgeon's request  Start time: 5/14/2025 12:15 PM  End time: 5/14/2025 12:25 PM  Staffing  Performed: anesthesiologist   Anesthesiologist: Tor Doran MD  Performed by: Tor Doran MD  Authorized by: Arik Carroll MD    Preanesthetic Checklist  Completed: patient identified, IV checked, site marked, risks and benefits discussed, surgical/procedural consents, equipment checked, pre-op evaluation, timeout performed, anesthesia consent given, oxygen available, monitors applied/VS acknowledged, fire risk safety assessment completed and verbalized and blood product R/B/A discussed and consented  Peripheral Block   Patient position: sitting  Prep: ChloraPrep  Provider prep: mask and sterile gloves  Patient monitoring: cardiac monitor, continuous pulse ox, continuous capnometry, frequent blood pressure checks, IV access, oxygen and responsive to questions  Block type: Brachial plexus  Supraclavicular  Laterality: right  Injection technique: single-shot  Guidance: ultrasound guided    Needle   Needle type: insulated echogenic nerve stimulator needle   Needle gauge: 20 G  Needle localization: ultrasound guidance  Needle length: 10 cm  Assessment   Injection assessment: negative aspiration for heme, no paresthesia on injection, local visualized surrounding nerve on ultrasound and no intravascular symptoms  Paresthesia pain: immediately resolved  Slow fractionated injection: yes  Hemodynamics: stable  Outcomes: patient tolerated procedure well and uncomplicated

## 2025-05-14 NOTE — ANESTHESIA PRE PROCEDURE
ORTHOPEDIC SURGERY Right 2015    second hammertoe repair    ORTHOPEDIC SURGERY Right 2022    foot surgery     OTHER SURGICAL HISTORY      colonoscopy--polyp    SMALL INTESTINE SURGERY  2022    GENERAL DIAGNOSTIC LAPAROSCOPY CONVERTED TO OPEN SMALL BOWEL RESECTION    TONSILLECTOMY      WISDOM TOOTH EXTRACTION         Social History:    Social History     Tobacco Use    Smoking status: Former     Current packs/day: 0.00     Types: Cigarettes     Quit date: 3/3/2000     Years since quittin.2    Smokeless tobacco: Never   Substance Use Topics    Alcohol use: Not Currently                                Counseling given: Not Answered      Vital Signs (Current):   Vitals:    25 1234   Weight: 81.6 kg (180 lb)   Height: 1.676 m (5' 6\")                                              BP Readings from Last 3 Encounters:   23 (!) 143/82   23 (!) 154/76   22 (!) 146/68       NPO Status:                                                                                 BMI:   Wt Readings from Last 3 Encounters:   25 81.6 kg (180 lb)   25 81.6 kg (180 lb)   23 83.5 kg (184 lb 1.4 oz)     Body mass index is 29.05 kg/m².    CBC:   Lab Results   Component Value Date/Time    WBC 14.3 2023 05:49 PM    RBC 5.16 2023 05:49 PM    HGB 15.0 2023 05:49 PM    HCT 45.1 2023 05:49 PM    MCV 87.4 2023 05:49 PM    RDW 13.1 2023 05:49 PM     2023 05:49 PM       CMP:   Lab Results   Component Value Date/Time     2023 05:49 PM    K 3.9 2023 05:49 PM     2023 05:49 PM    CO2 29 2023 05:49 PM    BUN 15 2023 05:49 PM    CREATININE 0.86 2023 05:49 PM    GFRAA >60 2022 02:39 AM    AGRATIO 1.0 2022 07:54 AM    LABGLOM >60 2023 05:49 PM    LABGLOM >60 2022 07:54 AM    GLUCOSE 124 2023 05:49 PM    CALCIUM 8.4 2023 05:49 PM    BILITOT 0.7 2023 12:52 PM    ALKPHOS 129

## 2025-05-14 NOTE — DISCHARGE INSTRUCTIONS
DISCHARGE INSTRUCTIONS - LORENA SALEEM MD    DIET: You may resume your regular diet. It is common to experience some nausea after surgery, so you are encouraged to start with light, bland foods.     MEDICATION: A prescription for postoperative pain relief was sent to your provided pharmacy. Take the pain medication as prescribed, preferably with food. You may also take ibuprofen or an equivalent (for example, Motrin, Advil, Aleve) in between pain medication for break-through pain as needed. Tylenol may be taken minimally as an alternative if you are not able to take ibuprofen products. If antibiotics are prescribed, be sure to complete the medication as prescribed. While taking pain medication, drink plenty of fluids and eat foods high in fiber to help prevent constipation, a common side effect of pain medication.     ACTIVITY:     * Elevate your arm above the level of the heart as often as you can throughout the day for the first 72 hours. You may continue to elevate if your swelling continues after 72 hours.    * Exercise fingers to avoid stiffness, by gently opening and closing them, unless the fingers are bandaged.     * Do not lift anything with your postoperative arm immediately after surgery. Avoid lifting more than 5 pounds until you have been cleared by Dr. Saleem.    INCISION:             * Keep incision covered and dry until your first postoperative appointment.            * An ice bag/pack may be applied to your hand/arm for pain and swelling. Apply the ice for 20 minute increments as often as you need to throughout the day. Do not apply ice directly to your skin. Pain and swelling is normal after your surgery and may continue for 3-5 days and in some cases a bit longer.     DRESSINGS:     Casts and splinted dressings (half hard/half soft):           * Keep on until removed at your first postoperative office visit.           * Keep clean and dry. If the cast or splint accidentally gets wet, use

## 2025-05-14 NOTE — BRIEF OP NOTE
Brief Postoperative Note      Patient: Clinton Grewal  YOB: 1940  MRN: [id number]    Date of Procedure: 5/14/2025    Pre-Op Diagnosis Codes:      * Arthritis of right hand [M19.041]    Post-Op Diagnosis: Same       Procedure(s):  RIGHT INDEX AND  MIDDLE FINGER METACARPAL PHALANGEAL JOINT ARTHROPLASTY WITH RIGHT MIDDLE FINGER PROXIMAL INTERPHALANGEAL JOINT ARTHROPLASTY    Surgeon(s):  eJan Nelson MD    Assistant:  * No surgical staff found *    Anesthesia: Regional    Estimated Blood Loss (mL): Minimal    Complications: None    Specimens:   * No specimens in log *    Implants:  Implant Name Type Inv. Item Serial No.  Lot No. LRB No. Used Action   STEM FNGR JT 3 L43.2MM DIA6.4MM FLEXSPAN PROX NONCOATED FLX - SN/A Other STEM FNGR JT 3 L43.2MM DIA6.4MM FLEXSPAN PROX NONCOATED FLX N/A [employer] [id number] Right 1 Implanted   STEM FNGR JT 6 L62MM OD8.4MM FLEXSPAN PROX NONCOATED FLX - SNA  STEM FNGR JT 6 L62MM OD8.4MM FLEXSPAN PROX NONCOATED FLX NA [employer] [id number] Right 1 Implanted   STEM FNGR JT SZ 5 L556MM [id number] FLEXSPAN PROX NONCOATED - SNA  STEM FNGR JT SZ 5 L556MM [id number] FLEXSPAN PROX NONCOATED NA [employer] [id number] Right 1 Implanted         Drains: * No LDAs found *    Findings:  Infection Present At Time Of Surgery (PATOS) (choose all levels that have infection present):  No infection present  Other Findings: c/w right hand osteoarthritis    Electronically signed by Jean Nelson MD on 5/14/2025 at 2:40 PM

## 2025-05-14 NOTE — PERIOP NOTE
Permission received to review discharge instructions and discuss private health information with friend and will have someone with them after discharge   Patient states that family/friend will be with them for at least 24 hours following today's procedure.   Air Warming blanket placed on pt; turned on for comfort    No belongings in PACU

## 2025-05-14 NOTE — ANESTHESIA POSTPROCEDURE EVALUATION
Department of Anesthesiology  Postprocedure Note    Patient: Clinton Grewal  MRN: [id number]  YOB: 1940  Date of evaluation: 5/14/2025    Procedure Summary       Date: 05/14/25 Room / [facility]    Anesthesia Start: 1239 Anesthesia Stop: 1443    Procedure: RIGHT INDEX AND  MIDDLE FINGER METACARPAL PHALANGEAL JOINT ARTHROPLASTY WITH RIGHT MIDDLE FINGER PROXIMAL INTERPHALANGEAL JOINT ARTHROPLASTY (Right: Fingers) Diagnosis:       Arthritis of right hand      (Arthritis of right hand [M19.041])    Surgeons: Jean Nelson MD Responsible Provider: Juwan Frazier MD    Anesthesia Type: MAC, Regional ASA Status: 3            Anesthesia Type: MAC, Regional    Jakob Phase I: Jakob Score: 8    Jaokb Phase II:      Anesthesia Post Evaluation    Patient location during evaluation: PACU  Patient participation: complete - patient participated  Level of consciousness: awake  Pain score: 0  Airway patency: patent  Nausea & Vomiting: no nausea and no vomiting  Cardiovascular status: hemodynamically stable  Respiratory status: acceptable  Hydration status: stable  Multimodal analgesia pain management approach  Pain management: adequate    No notable events documented.

## 2025-05-15 NOTE — OP NOTE
[facility]              [address] [place]  [address]                            OPERATIVE REPORT      PATIENT NAME: DILLAN BENAVIDES              : 1940  MED REC NO: [id number]                       ROOM: [facility]  ACCOUNT NO: [id number]                       ADMIT DATE: 2025  PROVIDER: Jean Nelson MD    DATE OF SERVICE:  2025    PREOPERATIVE DIAGNOSES:  Osteoarthritis, right hand including index and middle metacarpophalangeal joints and middle finger proximal interphalangeal joint.    POSTOPERATIVE DIAGNOSES:  Osteoarthritis, right hand including index and middle metacarpophalangeal joints and middle finger proximal interphalangeal joint.    PROCEDURES PERFORMED:       1. Right middle finger proximal interphalangeal silicone joint replacement arthroplasty.     2. Right index finger metacarpophalangeal silicone joint replacement arthroplasty.     3. Right middle finger metacarpophalangeal silicone joint replacement arthroplasty.    SURGEON:  Jean Nelson MD    ASSISTANT:  There were no surgical assistants.    ANESTHESIA:  Regional nerve block and sedation.    ESTIMATED BLOOD LOSS:  Less than 5 mL.    SPECIMENS REMOVED:  There were no specimens.    INTRAOPERATIVE FINDINGS:  Consistent with right hand osteoarthritis.     COMPLICATIONS:  None.    IMPLANTS:  Included [employer] silicone joint replacement implants, size 3, middle finger PIP; size 5, right index MP joint; size 6, right middle MP joint.    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  Over time, he has developed arthritic changes, ongoing pain, stiffness, limited motion, and elects not to be taken to the operating room to undergo surgical silicone joint replacement

## 2025-07-01 ENCOUNTER — OFFICE VISIT (OUTPATIENT)
Facility: CLINIC | Age: 85
End: 2025-07-01
Payer: MEDICARE

## 2025-07-01 VITALS
WEIGHT: 180 LBS | HEART RATE: 71 BPM | DIASTOLIC BLOOD PRESSURE: 74 MMHG | BODY MASS INDEX: 28.93 KG/M2 | HEIGHT: 66 IN | OXYGEN SATURATION: 95 % | SYSTOLIC BLOOD PRESSURE: 122 MMHG

## 2025-07-01 DIAGNOSIS — L40.0 PLAQUE PSORIASIS: ICD-10-CM

## 2025-07-01 DIAGNOSIS — I10 PRIMARY HYPERTENSION: Primary | ICD-10-CM

## 2025-07-01 DIAGNOSIS — E78.2 MIXED HYPERLIPIDEMIA: ICD-10-CM

## 2025-07-01 PROBLEM — K56.609 SMALL BOWEL OBSTRUCTION (HCC): Status: RESOLVED | Noted: 2022-02-08 | Resolved: 2025-07-01

## 2025-07-01 PROBLEM — U07.1 COVID-19: Status: RESOLVED | Noted: 2022-02-08 | Resolved: 2025-07-01

## 2025-07-01 PROBLEM — K56.609 SBO (SMALL BOWEL OBSTRUCTION) (HCC): Status: RESOLVED | Noted: 2022-03-16 | Resolved: 2025-07-01

## 2025-07-01 PROCEDURE — 1123F ACP DISCUSS/DSCN MKR DOCD: CPT | Performed by: STUDENT IN AN ORGANIZED HEALTH CARE EDUCATION/TRAINING PROGRAM

## 2025-07-01 PROCEDURE — 3078F DIAST BP <80 MM HG: CPT | Performed by: STUDENT IN AN ORGANIZED HEALTH CARE EDUCATION/TRAINING PROGRAM

## 2025-07-01 PROCEDURE — 3074F SYST BP LT 130 MM HG: CPT | Performed by: STUDENT IN AN ORGANIZED HEALTH CARE EDUCATION/TRAINING PROGRAM

## 2025-07-01 PROCEDURE — 99204 OFFICE O/P NEW MOD 45 MIN: CPT | Performed by: STUDENT IN AN ORGANIZED HEALTH CARE EDUCATION/TRAINING PROGRAM

## 2025-07-01 PROCEDURE — 1160F RVW MEDS BY RX/DR IN RCRD: CPT | Performed by: STUDENT IN AN ORGANIZED HEALTH CARE EDUCATION/TRAINING PROGRAM

## 2025-07-01 PROCEDURE — 1159F MED LIST DOCD IN RCRD: CPT | Performed by: STUDENT IN AN ORGANIZED HEALTH CARE EDUCATION/TRAINING PROGRAM

## 2025-07-01 RX ORDER — EZETIMIBE 10 MG/1
TABLET ORAL
COMMUNITY
Start: 2024-07-23

## 2025-07-01 SDOH — ECONOMIC STABILITY: FOOD INSECURITY: WITHIN THE PAST 12 MONTHS, THE FOOD YOU BOUGHT JUST DIDN'T LAST AND YOU DIDN'T HAVE MONEY TO GET MORE.: NEVER TRUE

## 2025-07-01 SDOH — ECONOMIC STABILITY: FOOD INSECURITY: WITHIN THE PAST 12 MONTHS, YOU WORRIED THAT YOUR FOOD WOULD RUN OUT BEFORE YOU GOT MONEY TO BUY MORE.: NEVER TRUE

## 2025-07-01 ASSESSMENT — PATIENT HEALTH QUESTIONNAIRE - PHQ9
SUM OF ALL RESPONSES TO PHQ QUESTIONS 1-9: 0
2. FEELING DOWN, DEPRESSED OR HOPELESS: NOT AT ALL
SUM OF ALL RESPONSES TO PHQ QUESTIONS 1-9: 0
1. LITTLE INTEREST OR PLEASURE IN DOING THINGS: NOT AT ALL

## 2025-07-01 NOTE — PROGRESS NOTES
Chief Complaint   Patient presents with    New Patient         Health Maintenance Due   Topic Date Due    Depression Screen  Never done    Shingles vaccine (1 of 2) Never done    Respiratory Syncytial Virus (RSV) Pregnant or age 60 yrs+ (1 - 1-dose 75+ series) Never done    Pneumococcal 50+ years Vaccine (2 of 2 - PPSV23) 09/12/2019    COVID-19 Vaccine (4 - 2024-25 season) 09/01/2024    Annual Wellness Visit (Medicare Advantage)  Never done         \"Have you been to the ER, urgent care clinic since your last visit?  Hospitalized since your last visit?\"    NO    “Have you seen or consulted any other health care providers outside of [facility] since your last visit?”    NO

## 2025-07-01 NOTE — PROGRESS NOTES
Clinton Grewal a 84 y.o. male  has a past medical history of CAD (coronary artery disease), COVID, Diverticulosis, Hammertoe of right foot, Hypertension, Macular degeneration, Metatarsalgia, right foot, Sinus bradycardia, and Small bowel obstruction (HCC). presents to office today to establish care.    Subjective:    History of Present Illness    He has been under the care of Dr. Taras Bonds for the past 5 years but is seeking a new primary care physician due to insurance changes. He reports no current health concerns and feels fortunate to be in good health at his age. He maintains a plant-based diet as recommended by his cardiologist and exercises daily, including stretching routines. He sleeps 8 to 9 hours per night but does not feel fully rested upon waking, so he consumes a Celsius drink for its caffeine content. His vision and hearing are normal, although he has age-related macular degeneration in his right eye due to a past injury. His last eye exam was approximately 1.5 years ago. He quit smoking over 30 years ago due to a worsening breathing problem. He experiences a mild cough with phlegm production but does not consider it serious. He reports no postnasal drainage, acid reflux, fevers, or chills. He is slightly overweight and is actively trying to lose weight, having lost 5 pounds in the past month. He does not consume alcohol and reports feeling fortunate and happy on a daily basis. He enjoys spending time at his son's dental office and has a couple of grandchildren. He had a colonoscopy in the past and was advised that he did not need another one. He reports no family history of colon or prostate cancer, blood in urine, or difficulty urinating. He followed up with his prior PCP every 6 months. He has an upcoming appointment with [facility] in 07/2025. He has had surgery on his right hand and is currently waiting to resume playing golf. He reports no ankle swelling. He takes losartan for blood

## (undated) DEVICE — BANDAGE COBAN 4 IN COMPR W4INXL5YD FOAM COHESIVE QUIK STK SELF ADH SFT

## (undated) DEVICE — TUBING FLTR PLUME AWAY EVAC W/ SUCT DEV DISP PUREVIEW

## (undated) DEVICE — PRECISION (9.0 X 0.51 X 31.0MM)

## (undated) DEVICE — HYPODERMIC SAFETY NEEDLE: Brand: MAGELLAN

## (undated) DEVICE — SOL IRRIGATION INJ NACL 0.9% 500ML BTL

## (undated) DEVICE — SUT VCRL 4-0 18IN FS2 VIO --

## (undated) DEVICE — HANDLE LT SNAP ON ULT DURABLE LENS FOR TRUMPF ALC DISPOSABLE

## (undated) DEVICE — GLOVE ORTHO 7 1/2   MSG9475

## (undated) DEVICE — CURVED, LARGE JAW, OPEN SEALER/DIVIDER NANO-COATED: Brand: LIGASURE IMPACT

## (undated) DEVICE — SOLUTION IV 1000ML 0.9% SOD CHL

## (undated) DEVICE — SUTURE ETHLN SZ 4-0 L18IN NONABSORBABLE BLK L16MM PC-3 3/8 1864G

## (undated) DEVICE — TROCAR: Brand: KII® SLEEVE

## (undated) DEVICE — BANDAGE,GAUZE,CONFORMING,3"X75",STRL,LF: Brand: MEDLINE

## (undated) DEVICE — SUTURE STRATAFIX SYMMETRIC SZ 1 L18IN ABSRB VLT CT1 L36CM SXPP1A404

## (undated) DEVICE — INTENDED FOR TISSUE SEPARATION, AND OTHER PROCEDURES THAT REQUIRE A SHARP SURGICAL BLADE TO PUNCTURE OR CUT.: Brand: BARD-PARKER ® CARBON RIB-BACK BLADES

## (undated) DEVICE — SUTURE COAT VCRL SZ 4-0 L18IN ABSRB UD L19MM PS-2 1/2 CIR J496G

## (undated) DEVICE — HAND-MRMCASU: Brand: MEDLINE INDUSTRIES, INC.

## (undated) DEVICE — EXTREMITY-MRMC: Brand: MEDLINE INDUSTRIES, INC.

## (undated) DEVICE — PADDING UNDERCAST W3INXL12FT RAYON POLY SYN NONADHESIVE

## (undated) DEVICE — BLADE OPHTH GRN ROUNDED TIP 1 SIDE SHRP GRINDLESS MINI-BLDE

## (undated) DEVICE — GLOVE ORANGE PI 7 1/2   MSG9075

## (undated) DEVICE — TROCAR: Brand: KII FIOS FIRST ENTRY

## (undated) DEVICE — SURGICAL PROCEDURE PACK BASIN MAJ SET CUST NO CAUT

## (undated) DEVICE — TOWEL,OR,DSP,ST,BLUE,STD,4/PK,20PK/CS: Brand: MEDLINE

## (undated) DEVICE — SUTURE VCRL 2-0 L27IN ABSRB UD PS-2 L19MM 1/2 CIR J428H

## (undated) DEVICE — YANKAUER,BULB TIP,W/O VENT,RIGID,STERILE: Brand: MEDLINE

## (undated) DEVICE — NDL PRT INJ NSAF BLNT 18GX1.5 --

## (undated) DEVICE — SUTURE SZ 0 27IN 5/8 CIR UR-6  TAPER PT VIOLET ABSRB VICRYL J603H

## (undated) DEVICE — MASTISOL ADHESIVE LIQ 2/3ML

## (undated) DEVICE — GLOVE ORANGE PI 8   MSG9080

## (undated) DEVICE — SST BUR, EGG, 14 FLUTES X 6MM DIA.: Brand: MICROAIRE®

## (undated) DEVICE — BNDG ELAS HK LOOP 3X5YD NS -- MATRIX

## (undated) DEVICE — SUTURE VCRL SZ 0 L27IN ABSRB UD L26MM CT-2 1/2 CIR J270H

## (undated) DEVICE — Device

## (undated) DEVICE — BANDAGE COMPR 9 FTX4 IN SMOOTH COMFORTABLE SYNTH ESMRK LF

## (undated) DEVICE — SUT ETHLN 4-0 18IN PS2 BLK --

## (undated) DEVICE — SPONGE GZ W4XL4IN COT 12 PLY TYP VII WVN C FLD DSGN

## (undated) DEVICE — DRAPE,EXTREMITY,89X128,STERILE: Brand: MEDLINE

## (undated) DEVICE — TUBING, SUCTION, 1/4" X 10', STRAIGHT: Brand: MEDLINE

## (undated) DEVICE — C-WIRE PAK DOUBLE ENDED ORTHOPAEDIC WIRE, TROCAR, .045" (1.14 MM): Brand: C-WIRE

## (undated) DEVICE — ZIMMER® STERILE DISPOSABLE TOURNIQUET CUFF WITH PROTECTIVE SLEEVE AND PLC, DUAL PORT, SINGLE BLADDER, 18 IN. (46 CM)

## (undated) DEVICE — SUTURE MCRYL SZ 4-0 L27IN ABSRB UD L19MM PS-2 1/2 CIR PRIM Y426H

## (undated) DEVICE — SUTURE VCRL SZ 3-0 L27IN ABSRB UD FS-2 L19MM 1/2 CIR J423H

## (undated) DEVICE — BLADE ASSEMB CLP HAIR FINE --

## (undated) DEVICE — SUTURE PERMAHAND SZ 3-0 L30IN NONABSORBABLE BLK L26MM SH C017D

## (undated) DEVICE — SUTURE VCRL SZ 3-0 L18IN ABSRB UD PS-2 L19MM 1/2 CIR J497G

## (undated) DEVICE — CORD ES L12FT BPLR FRCP

## (undated) DEVICE — BANDAGE,GAUZE,BULKEE II,2.25"X3YD,STRL: Brand: MEDLINE

## (undated) DEVICE — NEEDLE HYPO 18GA L1.5IN PNK S STL HUB POLYPR SHLD REG BVL

## (undated) DEVICE — SYR 10ML LUER LOK 1/5ML GRAD --

## (undated) DEVICE — STRIP,CLOSURE,WOUND,MEDI-STRIP,1/2X4: Brand: MEDLINE

## (undated) DEVICE — DRAPE EQUIP CARM MINI 85X54 IN W/ELASTIC OPENING

## (undated) DEVICE — SOLUTION IRRIG 1000ML 09% SOD CHL USP PIC PLAS CONTAINER

## (undated) DEVICE — ZIMMER® STERILE DISPOSABLE TOURNIQUET CUFF WITH PLC, DUAL PORT, SINGLE BLADDER, 18 IN. (46 CM)

## (undated) DEVICE — SUTURE MONOCRYL SZ 4-0 L27IN ABSRB UD L19MM PS-2 1/2 CIR PRIM Y426H

## (undated) DEVICE — NEEDLE HYPO 27GA L1.25IN GRY POLYPR HUB S STL REG BVL STR

## (undated) DEVICE — SHEAR HARMONIC ACET 5MMX36CM -- ACE PLUS

## (undated) DEVICE — 40418 TRENDELENBURG ONE-STEP ARM PROTECTORS LARGE (1 PAIR): Brand: 40418 TRENDELENBURG ONE-STEP ARM PROTECTORS LARGE (1 PAIR)

## (undated) DEVICE — BANDAGE,GAUZE,BULKEE II,4.5"X4.1YD,STRL: Brand: MEDLINE

## (undated) DEVICE — PICO 7 10CM X 30CM: Brand: PICO™ 7

## (undated) DEVICE — MAJOR LAPAROTOMY-MRMC: Brand: MEDLINE INDUSTRIES, INC.

## (undated) DEVICE — DRAPE, SLUSH XL, 44X66, STERILE: Brand: MEDLINE

## (undated) DEVICE — DRAPE C ARM W41XL59IN MINI ELAS OPN

## (undated) DEVICE — PADDING CAST W3INXL4YD COT BLEND MIC PLEAT UNDERCAST SPEC

## (undated) DEVICE — INFECTION CONTROL KIT SYS

## (undated) DEVICE — SUTURE VCRL SZ 3-0 L27IN ABSRB UD L26MM SH 1/2 CIR J416H

## (undated) DEVICE — PRECISION THIN (9.0 X 0.38 X 31.0MM)

## (undated) DEVICE — SYRINGE,EAR/ULCER, 2 OZ, STERILE: Brand: MEDLINE

## (undated) DEVICE — TOTAL TRAY, 16FR 10ML SIL FOLEY, URN: Brand: MEDLINE

## (undated) DEVICE — STOCKINETTE: Brand: DEROYAL

## (undated) DEVICE — WOUND RETRACTOR AND PROTECTOR: Brand: ALEXIS O WOUND PROTECTOR-RETRACTOR

## (undated) DEVICE — TROCAR: Brand: KII SLEEVE

## (undated) DEVICE — TRAY PREP DRY W/ PREM GLV 2 APPL 6 SPNG 2 UNDPD 1 OVERWRAP

## (undated) DEVICE — E-Z CLEAN, PTFE COATED, ELECTROSURGICAL LAPAROSCOPIC ELECTRODE, L-HOOK, 33 CM., SINGLE-USE, FOR USE WITH HAND CONTROL PENCIL: Brand: MEGADYNE

## (undated) DEVICE — TUBING INSUF 0.3UM FLTR W/ LUERLOCK CONN

## (undated) DEVICE — CANISTER, RIGID, 3000CC: Brand: MEDLINE INDUSTRIES, INC.

## (undated) DEVICE — DISPOSABLE TOURNIQUET CUFF SINGLE BLADDER, DUAL PORT AND QUICK CONNECT CONNECTOR: Brand: COLOR CUFF

## (undated) DEVICE — STERILE POLYISOPRENE POWDER-FREE SURGICAL GLOVES: Brand: PROTEXIS

## (undated) DEVICE — SUTURE PDS II SZ 4-0 L27IN ABSRB VLT L17MM RB-1 1/2 CIR Z304H

## (undated) DEVICE — STRIP SKIN CLSR W0.25XL4IN WHT SPUNBOUND FBR NYL HI ADH

## (undated) DEVICE — SYR 3ML LL TIP 1/10ML GRAD --

## (undated) DEVICE — GENERAL LAPAROSCOPY-MRMC: Brand: MEDLINE INDUSTRIES, INC.

## (undated) DEVICE — PRECISION OFFSET (9.0 X 0.51 X 25.0MM)

## (undated) DEVICE — 40580 - THE PINK PAD - ADVANCED TRENDELENBURG POSITIONING KIT: Brand: 40580 - THE PINK PAD - ADVANCED TRENDELENBURG POSITIONING KIT

## (undated) DEVICE — CURITY NON-ADHERENT STRIPS: Brand: CURITY

## (undated) DEVICE — STAPLER INT L34CM 60MM LNG ENDOSCP ARTC PWR + ECHELON FLX

## (undated) DEVICE — VISUALIZATION SYSTEM: Brand: CLEARIFY

## (undated) DEVICE — REM POLYHESIVE ADULT PATIENT RETURN ELECTRODE: Brand: VALLEYLAB

## (undated) DEVICE — PREP SKN CHLRAPRP APL 26ML STR --

## (undated) DEVICE — SPLINT ORTH W4XL15IN PLSTR OF PARIS LO EXOTHERM SMOOTH

## (undated) DEVICE — PACK,BASIC,SIRUS,V: Brand: MEDLINE

## (undated) DEVICE — RELOAD STPL L60MM H1.5-3.6MM REG TISS BLU GRIPPING SURF B

## (undated) DEVICE — KENDALL SCD EXPRESS SLEEVES, KNEE LENGTH, MEDIUM: Brand: KENDALL SCD

## (undated) DEVICE — SMALL OSC. SAW BLADE, 5.5MM X 25MM X 0.7MM: Brand: MICROAIRE®

## (undated) DEVICE — BLUNTFILL: Brand: MONOJECT

## (undated) DEVICE — GLOVE ORTHO 8   MSG9480

## (undated) DEVICE — WRAP COHESIVE W2INXL5YD TAN SELF ADH BNDG HND NON STERILE TEAR CARING

## (undated) DEVICE — DRSG GZ OIL EMUL CURAD 3X3 --

## (undated) DEVICE — BANDAGE COMPR W3INXL5YD WHT BGE POLY COT M E WRP WV HK AND